# Patient Record
Sex: MALE | Race: WHITE | Employment: UNEMPLOYED | URBAN - METROPOLITAN AREA
[De-identification: names, ages, dates, MRNs, and addresses within clinical notes are randomized per-mention and may not be internally consistent; named-entity substitution may affect disease eponyms.]

---

## 2019-05-20 ENCOUNTER — OFFICE VISIT (OUTPATIENT)
Dept: CARDIOLOGY CLINIC | Age: 64
End: 2019-05-20

## 2019-05-20 VITALS
RESPIRATION RATE: 16 BRPM | WEIGHT: 178 LBS | HEIGHT: 68 IN | DIASTOLIC BLOOD PRESSURE: 90 MMHG | BODY MASS INDEX: 26.98 KG/M2 | OXYGEN SATURATION: 99 % | HEART RATE: 136 BPM | SYSTOLIC BLOOD PRESSURE: 130 MMHG

## 2019-05-20 DIAGNOSIS — I10 ESSENTIAL HYPERTENSION: ICD-10-CM

## 2019-05-20 DIAGNOSIS — Z76.89 ENCOUNTER TO ESTABLISH CARE: ICD-10-CM

## 2019-05-20 DIAGNOSIS — I48.91 ATRIAL FIBRILLATION WITH RVR (HCC): Primary | ICD-10-CM

## 2019-05-20 RX ORDER — DILTIAZEM HYDROCHLORIDE 180 MG/1
180 CAPSULE, COATED, EXTENDED RELEASE ORAL DAILY
Qty: 30 CAP | Refills: 6 | Status: SHIPPED | OUTPATIENT
Start: 2019-05-20 | End: 2019-06-03 | Stop reason: ALTCHOICE

## 2019-05-20 NOTE — PROGRESS NOTES
[id number] [address]  [phone]     Subjective:      Emerald Reyes is a 59 y.o. male with pmhx colon cancer who presents to clinic seeking cardiac clearance for pending cataract surgery. He was at the [facility] early this morning for scheduled surgery, found to be in atrial fib so surgery was canceled and now needs clearance prior to proceed for said procedure. He denies any cardiac symtpoms. Family hx CAD (father), denies hx of smoking / alcohol / drug abuse. The patient denies chest pain/ shortness of breath, orthopnea, PND, LE edema, palpitations, syncope, or presyncope. There are no active problems to display for this patient. No primary care provider on file.   Past Medical History:   Diagnosis Date    Atrial fibrillation [facility])       Past Surgical History:   Procedure Laterality Date    HX COLECTOMY  09/2013    cancer     Not on File   Family History   Problem Relation Age of Onset    Cancer Mother     Coronary Artery Disease Father     Cancer Sister     Cancer Brother     Emphysema Brother       Social History     Socioeconomic History    Marital status:      Spouse name: Not on file    Number of children: Not on file    Years of education: Not on file    Highest education level: Not on file   Occupational History    Not on file   Social Needs    Financial resource strain: Not on file    Food insecurity:     Worry: Not on file     Inability: Not on file    Transportation needs:     Medical: Not on file     Non-medical: Not on file   Tobacco Use    Smoking status: Never Smoker   Substance and Sexual Activity    Alcohol use: Not Currently     Frequency: Never    Drug use: Never    Sexual activity: Not on file   Lifestyle    Physical activity:     Days per week: Not on file     Minutes per session: Not on file    Stress: Not on file   Relationships    Social connections:     Talks on phone: Not on file     Gets together: Not on file     Attends Religion service: Not on file     Active member of club or organization: Not on file     Attends meetings of clubs or organizations: Not on file     Relationship status: Not on file    Intimate partner violence:     Fear of current or ex partner: Not on file     Emotionally abused: Not on file     Physically abused: Not on file     Forced sexual activity: Not on file   Other Topics Concern    Not on file   Social History Narrative    Not on file      Current Outpatient Medications   Medication Sig    dilTIAZem CD (CARDIZEM CD) 180 mg ER capsule Take 1 Cap by mouth daily.  apixaban (ELIQUIS) 5 mg tablet Take 1 Tab by mouth two (2) times a day. No current facility-administered medications for this visit. Review of Symptoms:  11 systems reviewed, negative other than as stated in the HPI    Physical ExamPhysical Exam:    Vitals:    05/20/19 1017 05/20/19 1018 05/20/19 1045   BP: 110/90 (!) 120/100 130/90   Pulse: (!) 136     Resp: 16     SpO2: 99%     Weight: 178 lb (80.7 kg)     Height: 5' 8\" (1.727 m)       Body mass index is 27.06 kg/m². General PE   Gen:  NAD  Mental Status - Alert. General Appearance - Not in acute distress. Chest and Lung Exam   Inspection: Accessory muscles - No use of accessory muscles in breathing. Auscultation:   Breath sounds: - Normal.   Cardiovascular   Inspection: Jugular vein - Bilateral - Inspection Normal.   Palpation/Percussion:   Apical Impulse: - Normal.   Auscultation: Rhythm - IRRegular. Heart Sounds - S1 WNL and S2 WNL. No S3 or S4. Murmurs & Other Heart Sounds: Auscultation of the heart reveals - No Murmurs. Peripheral Vascular   Upper Extremity: Inspection - Bilateral - No Cyanotic nailbeds or Digital clubbing. Lower Extremity:   Palpation: Edema - Bilateral - No edema. Abdomen:   Soft, non-tender, bowel sounds are active.   Neuro: A&O times 3, CN and motor grossly WNL    Labs:   No results found for: CHOL, CHOLX, CHLST, CHOLV, 692300, HDL, LDL, LDLC, DLDLP, TGLX, TRIGL, TRIGP, CHHD, CHHDX  No results found for: CPK, CPKX, CPX  No results found for: NA, K, CL, CO2, AGAP, GLU, BUN, CREA, BUCR, GFRAA, GFRNA, CA, TBIL, TBILI, GPT, SGOT, AP, TP, ALB, GLOB, AGRAT, ALT    EKG:  AF RVR     Assessment:     Assessment:      1. Atrial fibrillation with RVR ([facility] [address]    2. Encounter to establish care    3. Essential hypertension        Orders Placed This Encounter    CBC W/O DIFF    CK    LIPID PANEL    METABOLIC PANEL, COMPREHENSIVE    MAGNESIUM    TSH REFLEX TO T4    AMB POC EKG ROUTINE W/ 12 LEADS, INTER & REP     Order Specific Question:   Reason for Exam:     Answer:   routine    dilTIAZem CD (CARDIZEM CD) 180 mg ER capsule     Sig: Take 1 Cap by mouth daily. Dispense:  30 Cap     Refill:  6    apixaban (ELIQUIS) 5 mg tablet     Sig: Take 1 Tab by mouth two (2) times a day. Dispense:  60 Tab     Refill:  3        Plan: This is a 59 y.o. male with pmhx colon cancer who presents to clinic seeking cardiac clearance for pending cataract surgery. He was at the [facility]. early this morning for scheduled surgery, found to be in atrial fib so surgery was canceled and now needs clearance prior to proceed for said procedure. He denies any cardiac symtpoms. Family hx CAD (father), denies hx of smoking / alcohol / drug abuse. Cardiac risk factors: male, age, unknown lipids, sedentary lifestyle, elevated BMI, family hx CAD    To note, he has not seen PCP since 2013. AF with RVR  Start Diltiazem 180 mg daily  Start Eliquis 5 mg BID for CVA risk reduction. Discussed risks and benefits of anticoagulation, signs and symptoms of bleeding, and to call if any concerns. Obtain echo to r/o structural heart disease. Check baseline labs including thyroid and electrolytes. F/u with np 2 weeks. If he remains in AF, plan for cardioversion. If he is in rate controlled AF, he may proceed with his cataract surgery. Atrial fibrillation CHADSVASC2 score stroke risk:   59 y.o.                                        <65        + 0   male                                         Male     [de-identified]  CHF hx                                           No    + 0  HTN hx                                           Yes    +1  Stroke/TIA/Thromboembolism       No    +0  Vascular disease hx                       No    + 0  Diabetes Mellitus                            No    + 0   CHADSVASC2 score                    1      Annual Stroke Risk 0.6% - low-moderate risk     BP elevated. Will reassess at follow up. Highly recommend to establish care with PCP. F/u in 2 weeks with NP.       Elias Steele MD

## 2019-05-20 NOTE — PROGRESS NOTES
Patient was seen at [facility] for procedure ( cataract) referred due to abnormal EKG  He has no cardiac complaints.

## 2019-05-24 LAB
ALBUMIN SERPL-MCNC: 4.6 G/DL (ref 3.6–4.8)
ALBUMIN/GLOB SERPL: 1.6 {RATIO} (ref 1.2–2.2)
ALP SERPL-CCNC: 82 IU/L (ref 39–117)
ALT SERPL-CCNC: 14 IU/L (ref 0–44)
AST SERPL-CCNC: 19 IU/L (ref 0–40)
BILIRUB SERPL-MCNC: 1 MG/DL (ref 0–1.2)
BUN SERPL-MCNC: 17 MG/DL (ref 8–27)
BUN/CREAT SERPL: 12 (ref 10–24)
CALCIUM SERPL-MCNC: 9.9 MG/DL (ref 8.6–10.2)
CHLORIDE SERPL-SCNC: 104 MMOL/L (ref 96–106)
CHOLEST SERPL-MCNC: 300 MG/DL (ref 100–199)
CK SERPL-CCNC: 109 U/L (ref 24–204)
CO2 SERPL-SCNC: 21 MMOL/L (ref 20–29)
COMMENT, 011824: ABNORMAL
CREAT SERPL-MCNC: 1.4 MG/DL (ref 0.76–1.27)
ERYTHROCYTE [DISTWIDTH] IN BLOOD BY AUTOMATED COUNT: 13.8 % (ref 12.3–15.4)
GLOBULIN SER CALC-MCNC: 2.9 G/DL (ref 1.5–4.5)
GLUCOSE SERPL-MCNC: 100 MG/DL (ref 65–99)
HCT VFR BLD AUTO: 46.1 % (ref 37.5–51)
HDLC SERPL-MCNC: 51 MG/DL
HGB BLD-MCNC: 15.5 G/DL (ref 13–17.7)
INTERPRETATION, 910389: NORMAL
INTERPRETATION: NORMAL
LDLC SERPL CALC-MCNC: 220 MG/DL (ref 0–99)
MAGNESIUM SERPL-MCNC: 2 MG/DL (ref 1.6–2.3)
MCH RBC QN AUTO: 29.8 PG (ref 26.6–33)
MCHC RBC AUTO-ENTMCNC: 33.6 G/DL (ref 31.5–35.7)
MCV RBC AUTO: 89 FL (ref 79–97)
PDF IMAGE, 910387: NORMAL
PLATELET # BLD AUTO: 177 X10E3/UL (ref 150–450)
POTASSIUM SERPL-SCNC: 4.6 MMOL/L (ref 3.5–5.2)
PROT SERPL-MCNC: 7.5 G/DL (ref 6–8.5)
RBC # BLD AUTO: 5.21 X10E6/UL (ref 4.14–5.8)
SODIUM SERPL-SCNC: 142 MMOL/L (ref 134–144)
TRIGL SERPL-MCNC: 143 MG/DL (ref 0–149)
TSH SERPL DL<=0.005 MIU/L-ACNC: 2.04 UIU/ML (ref 0.45–4.5)
VLDLC SERPL CALC-MCNC: 29 MG/DL (ref 5–40)
WBC # BLD AUTO: 6.3 X10E3/UL (ref 3.4–10.8)

## 2019-05-27 RX ORDER — ROSUVASTATIN CALCIUM 20 MG/1
20 TABLET, COATED ORAL
Qty: 90 TAB | Refills: 1
Start: 2019-05-27 | End: 2019-07-30 | Stop reason: SDUPTHER

## 2019-05-27 NOTE — PROGRESS NOTES
Kelly,    Please call the patient and inform him labs were reviewed. Overall, no concern for infection, anemia, liver dysfunction or electrolyte abnormalities. Kidney function is a little decreased, but we do not have any baseline to see if this is a chronic issue or new. Thyroid function is normal too, which is good. Cholesterol numbers, however, were extremely elevated, particularly the LDL, which is driving the Total cholesterol to be high also. LDL is 220 -- ideally for a healthy patient, this number should be < 130!! Total cholesterol is 300 -- this should be < 200!! He will most likely need to start medications to lower cholesterol. If willing, please start crestor 20 mg daily at bedtime. We can see how he's feeling with the medication at his follow-up visit with us in a few weeks. I have ordered the prescription but not sent it to the pharmacy yet -- if pt is willing, please send rx, otherwise, we will discuss further at his f/u appointment.     Thanks,  [employer]

## 2019-05-28 ENCOUNTER — TELEPHONE (OUTPATIENT)
Dept: CARDIOLOGY CLINIC | Age: 64
End: 2019-05-28

## 2019-05-28 NOTE — TELEPHONE ENCOUNTER
----- Message from Marilu Daniel NP sent at 5/27/2019  2:35 PM EDT -----  Smiley Louie,    Please call the patient and inform him labs were reviewed. Overall, no concern for infection, anemia, liver dysfunction or electrolyte abnormalities. Kidney function is a little decreased, but we do not have any baseline to see if this is a chronic issue or new. Thyroid function is normal too, which is good. Cholesterol numbers, however, were extremely elevated, particularly the LDL, which is driving the Total cholesterol to be high also. LDL is 220 -- ideally for a healthy patient, this number should be < 130!! Total cholesterol is 300 -- this should be < 200!! He will most likely need to start medications to lower cholesterol. If willing, please start crestor 20 mg daily at bedtime. We can see how he's feeling with the medication at his follow-up visit with us in a few weeks. I have ordered the prescription but not sent it to the pharmacy yet -- if pt is willing, please send rx, otherwise, we will discuss further at his f/u appointment.     Thanks,  [employer]

## 2019-05-29 ENCOUNTER — TELEPHONE (OUTPATIENT)
Dept: CARDIOLOGY CLINIC | Age: 64
End: 2019-05-29

## 2019-05-29 NOTE — TELEPHONE ENCOUNTER
Pt having issues getting crestor filled at [address]. Would like generic if possible.     Thanks, [employer]

## 2019-06-03 ENCOUNTER — OFFICE VISIT (OUTPATIENT)
Dept: CARDIOLOGY CLINIC | Age: 64
End: 2019-06-03

## 2019-06-03 VITALS
WEIGHT: 178.2 LBS | OXYGEN SATURATION: 98 % | HEART RATE: 98 BPM | DIASTOLIC BLOOD PRESSURE: 82 MMHG | RESPIRATION RATE: 18 BRPM | BODY MASS INDEX: 27.01 KG/M2 | HEIGHT: 68 IN | SYSTOLIC BLOOD PRESSURE: 118 MMHG

## 2019-06-03 DIAGNOSIS — I42.0 DILATED CARDIOMYOPATHY (HCC): ICD-10-CM

## 2019-06-03 DIAGNOSIS — I48.0 PAROXYSMAL ATRIAL FIBRILLATION (HCC): Primary | ICD-10-CM

## 2019-06-03 DIAGNOSIS — E78.2 MIXED HYPERLIPIDEMIA: ICD-10-CM

## 2019-06-03 PROBLEM — I48.91 ATRIAL FIBRILLATION (HCC): Status: ACTIVE | Noted: 2019-06-03

## 2019-06-03 RX ORDER — LISINOPRIL 2.5 MG/1
2.5 TABLET ORAL DAILY
Qty: 90 TAB | Refills: 3 | Status: SHIPPED | OUTPATIENT
Start: 2019-06-03 | End: 2020-05-13 | Stop reason: SDUPTHER

## 2019-06-03 RX ORDER — METOPROLOL SUCCINATE 100 MG/1
100 TABLET, EXTENDED RELEASE ORAL DAILY
Qty: 90 TAB | Refills: 3 | Status: SHIPPED | OUTPATIENT
Start: 2019-06-03 | End: 2019-07-23 | Stop reason: SDUPTHER

## 2019-06-03 NOTE — PROGRESS NOTES
David Lao, [facility]    Subjective/HPI:     Mr. Finesse Yee is a 59 y.o. male is here for A fib f/u. He has a PMHx of PAF new onset and colon cancer. At last visit, we started Diltiazem and Eliquis for new onset A fib with RVR, and also obtained echocardiogram.  He is pending cataract surgery and requires cardiac clearance. He denies complaints of shortness of breath, dizziness, palpitations, chest pains, orthopnea, PND or edema  He has been tolerating all medications well. He has not yet started Crestor because he wanted to discuss his labwork further. Cataract surgery has been rescheduled for June 17, 2019. Echo (5/29/19):  · Left Ventricle: Mildly dilated left ventricle. Severe systolic dysfunction. Estimated left ventricular ejection fraction is 16 - 20%. Left ventricular global hypokinesis. · Left Atrium: Mildly dilated left atrium. · Mitral Valve: Mild to moderate mitral valve regurgitation. PCP Provider  No primary care provider on file.   Past Medical History:   Diagnosis Date    Atrial fibrillation [facility])       Past Surgical History:   Procedure Laterality Date    HX COLECTOMY  09/2013    cancer     Family History   Problem Relation Age of Onset    Cancer Mother     Coronary Artery Disease Father     Cancer Sister     Cancer Brother     Emphysema Brother      Social History     Socioeconomic History    Marital status:      Spouse name: Not on file    Number of children: Not on file    Years of education: Not on file    Highest education level: Not on file   Occupational History    Not on file   Social Needs    Financial resource strain: Not on file    Food insecurity:     Worry: Not on file     Inability: Not on file    Transportation needs:     Medical: Not on file     Non-medical: Not on file   Tobacco Use    Smoking status: Never Smoker    Smokeless tobacco: Never Used   Substance and Sexual Activity    Alcohol use: Not Currently     Frequency: Never    Drug use: Never    Sexual activity: Not on file   Lifestyle    Physical activity:     Days per week: Not on file     Minutes per session: Not on file    Stress: Not on file   Relationships    Social connections:     Talks on phone: Not on file     Gets together: Not on file     Attends [employer] service: Not on file     Active member of club or organization: Not on file     Attends meetings of clubs or organizations: Not on file     Relationship status: Not on file    Intimate partner violence:     Fear of current or ex partner: Not on file     Emotionally abused: Not on file     Physically abused: Not on file     Forced sexual activity: Not on file   Other Topics Concern    Not on file   Social History Narrative    Not on file       No Known Allergies     Current Outpatient Medications   Medication Sig    dilTIAZem CD (CARDIZEM CD) 180 mg ER capsule Take 1 Cap by mouth daily.  apixaban (ELIQUIS) 5 mg tablet Take 1 Tab by mouth two (2) times a day.  rosuvastatin (CRESTOR) 20 mg tablet Take 1 Tab by mouth nightly. Indications: high cholesterol (Patient taking differently: Take 20 mg by mouth nightly. Hasn't started  Indications: high cholesterol)     No current facility-administered medications for this visit. I have reviewed the problem list, allergy list, medical history, family, social history and medications. Review of Symptoms:    Review of Systems   Constitutional: Negative for chills, fever and weight loss. HENT: Negative for nosebleeds. Eyes: Negative for blurred vision and double vision. Respiratory: Negative for cough, shortness of breath and wheezing. Cardiovascular: Negative for chest pain, palpitations, orthopnea, leg swelling and PND. Gastrointestinal: Negative for abdominal pain, blood in stool, diarrhea, nausea and vomiting. Musculoskeletal: Negative for joint pain. Skin: Negative for rash.    Neurological: Negative for dizziness, tingling and loss of consciousness. Endo/Heme/Allergies: Does not bruise/bleed easily. Physical Exam:      General: Well developed, in no acute distress, cooperative and alert  HEENT: No carotid bruits, no JVD, trach is midline. Neck Supple, PEERL, EOM intact. Heart:  reg rate and rhythm; normal S1/S2; no murmurs, gallops or rubs. Respiratory: Clear bilaterally x 4, no wheezing or rales  Abdomen:   Soft, non-tender, no distention, no masses. + BS. Extremities:  Normal cap refill, no cyanosis, atraumatic. No edema. Neuro: A&Ox3, speech clear, gait stable. Skin: Skin color is normal. No rashes or lesions. Non diaphoretic  Vascular: 2+ pulses symmetric in all extremities    Vitals:    06/03/19 1343 06/03/19 1351   BP: 118/82 118/82   Pulse: 98    Resp: 18    SpO2: 98%    Weight: 178 lb 3.2 oz (80.8 kg)    Height: 5' 8\" (1.727 m)        Cardiographics    ECG: atrial fibrillation  No results found for this or any previous visit. Cardiology Labs:  Lab Results   Component Value Date/Time    Cholesterol, total 300 (H) 05/23/2019 10:04 AM    HDL Cholesterol 51 05/23/2019 10:04 AM    LDL, calculated 220 (H) 05/23/2019 10:04 AM    Triglyceride 143 05/23/2019 10:04 AM       Lab Results   Component Value Date/Time    Sodium 142 05/23/2019 10:04 AM    Potassium 4.6 05/23/2019 10:04 AM    Chloride 104 05/23/2019 10:04 AM    CO2 21 05/23/2019 10:04 AM    Glucose 100 (H) 05/23/2019 10:04 AM    BUN 17 05/23/2019 10:04 AM    Creatinine 1.40 (H) 05/23/2019 10:04 AM    BUN/Creatinine ratio 12 05/23/2019 10:04 AM    GFR est AA 62 05/23/2019 10:04 AM    GFR est non-AA 53 (L) 05/23/2019 10:04 AM    Calcium 9.9 05/23/2019 10:04 AM    Bilirubin, total 1.0 05/23/2019 10:04 AM    AST (SGOT) 19 05/23/2019 10:04 AM    Alk.  phosphatase 82 05/23/2019 10:04 AM    Protein, total 7.5 05/23/2019 10:04 AM    Albumin 4.6 05/23/2019 10:04 AM    A-G Ratio 1.6 05/23/2019 10:04 AM    ALT (SGPT) 14 05/23/2019 10:04 AM           Assessment:     Assessment: ICD-10-CM ICD-9-CM    1. Paroxysmal atrial fibrillation (HCC) I48.0 427.31 AMB POC EKG ROUTINE W/ 12 LEADS, INTER & REP      metoprolol succinate (TOPROL-XL) 100 mg tablet      METABOLIC PANEL, BASIC   2. Dilated cardiomyopathy (HCC) I42.0 425.4 NUCLEAR CARDIAC STRESS TEST      metoprolol succinate (TOPROL-XL) 100 mg tablet   3. Mixed hyperlipidemia E78.2 272.2         Plan:     1. Paroxysmal atrial fibrillation ([address]  New onset PAF with RVR last visit -- rates improved today. Stop CCB given cardiomyopathy; start Toprol 100 mg daily. Continue Eliquis  - AMB POC EKG ROUTINE W/ 12 LEADS, INTER & REP    2. Dilated cardiomyopathy ([address]  Echo done 5/2019 with reduced LVEF 15-20%, mildly dilated LA and mild-mod MR  Likely tachycardic induced CM, but given risk factors, cannot completely rule out ischemia. Will start Lisinopril 2.5 mg daily; stop Dilt and start Toprol 100 mg daily. Repeat BMP in 1 month. Will obtain Lexiscan stress test    3. Mixed hyperlipidemia   in 5/2019  Discussed role of genetics and diet in hyperlipidemia  He will start taking Crestor 20 mg daily; plan to repeat labs in 3 months  Encouraged low fat, low cholesterol diet    F/u in 1 month to reassess rates and discuss stress test results. If stress test negative, will plan for cardioversion to restore sinus rhythm. Regardless of stress testing, he may proceed with cataract surgery considered low operative risk from CV standpoint, so long as rates are controlled. Addendum 6/13/19:  Discussed with Dr. Collette Falcon, who also recommended placing patient on LifeVest.  Called patient and discussed with him over the phone regarding [place], it's use in preventing sudden cardiac death, and him being at high risk for SCD given low ejection fraction. Discussed that the LifeVest would only be temporary until we re-evaluate the echocardiogram. The patient declines to place LifeVest at this time.   He does understand the risk of sudden cardiac death that could occur at any time. He will keep his f/u appointment with me on 7/8/19 to check HR and also assess response to medications.   1:07 PM      Grisel Baker NP

## 2019-06-03 NOTE — PROGRESS NOTES
Chief Complaint   Patient presents with    Irregular Heart Beat     2 week follow up    Surgical Clearance     need clearance for eye procedure     1. Have you been to the ER, urgent care clinic since your last visit? Hospitalized since your last visit? No    2. Have you seen or consulted any other health care providers outside of the [address] [place] [address] since your last visit? Include any pap smears or colon screening.  No

## 2019-06-12 ENCOUNTER — TELEPHONE (OUTPATIENT)
Dept: CARDIOLOGY CLINIC | Age: 64
End: 2019-06-12

## 2019-06-12 NOTE — TELEPHONE ENCOUNTER
Kelly-no evidence of active blood flow problems in the heart, but I would like the patient to follow-up in the near future to consider medication changes including changing lisinopril to [facility], probably changing metoprolol to carvedilol, arranging for cardioversion plus or minus transesophageal echo depending on whether the patient has been on anticoagulation for at least 4 to 6 weeks. We will also need to consider cardiac MRI if heart function does not improve with restoration of normal sinus rhythm. Juan Ree also need to consider Life-Vest.  Thanks.

## 2019-06-13 ENCOUNTER — TELEPHONE (OUTPATIENT)
Dept: CARDIOLOGY CLINIC | Age: 64
End: 2019-06-13

## 2019-06-13 NOTE — TELEPHONE ENCOUNTER
Patient informed has appt 7/8/19 with OC Reyes.  He questions if cleared for cataract 6/17/19 please advise thanks

## 2019-06-13 NOTE — TELEPHONE ENCOUNTER
Fax note to [phone] as soon as possible because they are closed tomorrow and anesthesia is asking for it now. Thanks!

## 2019-07-02 ENCOUNTER — TELEPHONE (OUTPATIENT)
Dept: CARDIOLOGY CLINIC | Age: 64
End: 2019-07-02

## 2019-07-02 LAB
BUN SERPL-MCNC: 21 MG/DL (ref 8–27)
BUN/CREAT SERPL: 16 (ref 10–24)
CALCIUM SERPL-MCNC: 9.7 MG/DL (ref 8.6–10.2)
CHLORIDE SERPL-SCNC: 105 MMOL/L (ref 96–106)
CO2 SERPL-SCNC: 25 MMOL/L (ref 20–29)
CREAT SERPL-MCNC: 1.34 MG/DL (ref 0.76–1.27)
GLUCOSE SERPL-MCNC: 108 MG/DL (ref 65–99)
INTERPRETATION: NORMAL
POTASSIUM SERPL-SCNC: 4.7 MMOL/L (ref 3.5–5.2)
SODIUM SERPL-SCNC: 143 MMOL/L (ref 134–144)

## 2019-07-02 NOTE — TELEPHONE ENCOUNTER
----- Message from Blossom Briceño NP sent at 7/2/2019  8:56 AM EDT -----  Verdis Grade,    Please call the patient and inform that labwork shows stable kidney function. Please make sure he continues the medications we prescribed last visit and keep his f/u appt with me/Dr. Sarita Gallegos as scheduled.     Thanks,  [employer]

## 2019-07-02 NOTE — PROGRESS NOTES
Kelly,    Please call the patient and inform that labwork shows stable kidney function. Please make sure he continues the medications we prescribed last visit and keep his f/u appt with me/Dr. Magalie Jeter as scheduled.     Thanks,  [employer]

## 2019-07-03 DIAGNOSIS — I48.0 PAROXYSMAL ATRIAL FIBRILLATION (HCC): ICD-10-CM

## 2019-07-05 NOTE — PROGRESS NOTES
Germaine Scheuermann, [facility]    Subjective/HPI:     Mr. Esperanza Starr is a 59 y.o. male is here for A fib f/u. He has a PMHx of PAF new onset, non-ischemic cardiomyopathy, familial HLD and colon cancer. At last visit, we adjusted medications for optimal medical management for cardiomyopathy, started statin therapy and obtained lexiscan stress test to evaluate for ischemia. He was unable to get cataract surgery done as his anesthesiologist felt he was at high risk for anesthesia given cardiomyopathy. He is doing well with current meds. He denies complaints of chest pains, dizziness, orthopnea, PND or edema. He is interested in [employer] now, after further discussion. Lexiscan (6/12/19):  · Baseline ECG: Atrial fibrillation. · There was no ST segment deviation noted during stress. · Gated SPECT: Left ventricular function post-stress was abnormal. Calculated ejection fraction is 24%. There is no evidence of transient ischemic dilation (TID). · Calculated ejection fraction is likely to be inaccurate due to gating artifact from atrial fibrillation. Correlation with echocardiogram is recommended. · Left ventricular perfusion is probably normal.  · Myocardial perfusion imaging defect 1: There is a defect that is small in size with a mild reduction in uptake present in the mid inferior location(s) that is non-reversible. The defect appears to probably be artifact caused by subdiaphragmatic activity. Perfusion defect was visually present without quantitative evidence. · Myocardial perfusion imaging supports a low risk stress test.           PCP Provider  No primary care provider on file.   Past Medical History:   Diagnosis Date    Atrial fibrillation [facility])       Past Surgical History:   Procedure Laterality Date    HX COLECTOMY  09/2013    cancer     Family History   Problem Relation Age of Onset    Cancer Mother     Coronary Artery Disease Father     Cancer Sister     Cancer Brother     Emphysema Brother      Social History     Socioeconomic History    Marital status:      Spouse name: Not on file    Number of children: Not on file    Years of education: Not on file    Highest education level: Not on file   Occupational History    Not on file   Social Needs    Financial resource strain: Not on file    Food insecurity:     Worry: Not on file     Inability: Not on file    Transportation needs:     Medical: Not on file     Non-medical: Not on file   Tobacco Use    Smoking status: Never Smoker    Smokeless tobacco: Never Used   Substance and Sexual Activity    Alcohol use: Not Currently     Frequency: Never    Drug use: Never    Sexual activity: Not on file   Lifestyle    Physical activity:     Days per week: Not on file     Minutes per session: Not on file    Stress: Not on file   Relationships    Social connections:     Talks on phone: Not on file     Gets together: Not on file     Attends Hinduism service: Not on file     Active member of club or organization: Not on file     Attends meetings of clubs or organizations: Not on file     Relationship status: Not on file    Intimate partner violence:     Fear of current or ex partner: Not on file     Emotionally abused: Not on file     Physically abused: Not on file     Forced sexual activity: Not on file   Other Topics Concern    Not on file   Social History Narrative    Not on file       No Known Allergies     Current Outpatient Medications   Medication Sig    amiodarone (CORDARONE) 200 mg tablet Take 1 Tab by mouth daily. 1 tablet p.o. twice daily for 2 weeks, then decrease to 200 mg daily    metoprolol succinate (TOPROL-XL) 100 mg tablet Take 1 Tab by mouth daily.  lisinopril (PRINIVIL, ZESTRIL) 2.5 mg tablet Take 1 Tab by mouth daily.  rosuvastatin (CRESTOR) 20 mg tablet Take 1 Tab by mouth nightly. Indications: high cholesterol    apixaban (ELIQUIS) 5 mg tablet Take 1 Tab by mouth two (2) times a day.      No current facility-administered medications for this visit. I have reviewed the problem list, allergy list, medical history, family, social history and medications. Review of Symptoms:    Review of Systems   Constitutional: Negative for chills, fever and weight loss. HENT: Negative for nosebleeds. Eyes: Negative for blurred vision and double vision. Respiratory: Negative for cough, shortness of breath and wheezing. Cardiovascular: Negative for chest pain, palpitations, orthopnea, leg swelling and PND. Gastrointestinal: Negative for abdominal pain, blood in stool, diarrhea, nausea and vomiting. Musculoskeletal: Negative for joint pain. Skin: Negative for rash. Neurological: Negative for dizziness, tingling and loss of consciousness. Endo/Heme/Allergies: Does not bruise/bleed easily. Physical Exam:      General: Well developed, in no acute distress, cooperative and alert  HEENT: No carotid bruits, no JVD, trach is midline. Neck Supple, PEERL, EOM intact. Heart:  irreg irreg; normal S1/S2; no murmurs, gallops or rubs. Respiratory: Clear bilaterally x 4, no wheezing or rales  Abdomen:   Soft, non-tender, no distention, no masses. + BS. Extremities:  Normal cap refill, no cyanosis, atraumatic. No edema. Neuro: A&Ox3, speech clear, gait stable. Skin: Skin color is normal. No rashes or lesions. Non diaphoretic  Vascular: 2+ pulses symmetric in all extremities    Vitals:    07/08/19 1314 07/08/19 1317   BP: 108/62 106/60   Pulse: 72    Resp: 18    SpO2: 98%    Weight: 178 lb (80.7 kg)    Height: 5' 8\" (1.727 m)        Cardiographics    ECG: atrial fibrillation  No results found for this or any previous visit.     Cardiology Labs:  Lab Results   Component Value Date/Time    Cholesterol, total 300 (H) 05/23/2019 10:04 AM    HDL Cholesterol 51 05/23/2019 10:04 AM    LDL, calculated 220 (H) 05/23/2019 10:04 AM    Triglyceride 143 05/23/2019 10:04 AM       Lab Results   Component Value Date/Time    Sodium 143 07/01/2019 08:43 AM    Potassium 4.7 07/01/2019 08:43 AM    Chloride 105 07/01/2019 08:43 AM    CO2 25 07/01/2019 08:43 AM    Glucose 108 (H) 07/01/2019 08:43 AM    BUN 21 07/01/2019 08:43 AM    Creatinine 1.34 (H) 07/01/2019 08:43 AM    BUN/Creatinine ratio 16 07/01/2019 08:43 AM    GFR est AA 65 07/01/2019 08:43 AM    GFR est non-AA 56 (L) 07/01/2019 08:43 AM    Calcium 9.7 07/01/2019 08:43 AM    Bilirubin, total 1.0 05/23/2019 10:04 AM    AST (SGOT) 19 05/23/2019 10:04 AM    Alk. phosphatase 82 05/23/2019 10:04 AM    Protein, total 7.5 05/23/2019 10:04 AM    Albumin 4.6 05/23/2019 10:04 AM    A-G Ratio 1.6 05/23/2019 10:04 AM    ALT (SGPT) 14 05/23/2019 10:04 AM           Assessment:     Assessment:       ICD-10-CM ICD-9-CM    1. Paroxysmal atrial fibrillation (HCC) I48.0 427.31 AMB POC EKG ROUTINE W/ 12 LEADS, INTER & REP      ECHO ADULT COMPLETE      MAGNESIUM      METABOLIC PANEL, BASIC      CARDIOVERSION, ELECTIVE;EXTERN   2. NICM (nonischemic cardiomyopathy) (HCC) I42.8 425.4 AMB POC EKG ROUTINE W/ 12 LEADS, INTER & REP      ECHO ADULT COMPLETE      MAGNESIUM      METABOLIC PANEL, BASIC      CARDIOVERSION, ELECTIVE;EXTERN   3. Mixed hyperlipidemia E78.2 272.2 ECHO ADULT COMPLETE      MAGNESIUM      METABOLIC PANEL, BASIC      CARDIOVERSION, ELECTIVE;EXTERN        Plan:     1. Paroxysmal atrial fibrillation (HCC)  Currently on Toprol for rate control and Eliquis for anti-coagulation  Remains in A fib, will plan for cardioversion. Has completed at least 4 weeks of anti-coagulation therapy. Add amiodarone 200 mg p.o. twice daily for 2 weeks, then 200 mg daily to increase chances of converting to NSR  F/u after cardioversion to assess rhythm.     2. Dilated cardiomyopathy ([address]  Echo done 5/2019 with reduced LVEF 15-20%, mildly dilated LA and mild-mod MR  Lexiscan stress test done 6/2019 without evidence of ischemia  Likely tachycardia-induced  Continue with Toprol and Lisinopril for medical management -- unable to use entresto due to borderline hypotension  Discussed use of LifeVest and he is agreeable to using this. He is at high risk for sudden cardiac death. Will arrange for LifeVest. Patient has a non-ischemic cardiomyopathy with increased risk for sudden cardiac death with ejection fraction 15-20% on most recent echocardiogram.  Plan to repeat echocardiogram in 9/3/2019. Follow-up after that. 3. Mixed hyperlipidemia   in 5/2019; Crestor 20 mg daily started. Repeat lipids in 9/2019.   High likelihood of familial HLD given elevated LDL > 190    F/u after cardioversion with Dr. Miguel Langston MD

## 2019-07-08 ENCOUNTER — OFFICE VISIT (OUTPATIENT)
Dept: CARDIOLOGY CLINIC | Age: 64
End: 2019-07-08

## 2019-07-08 VITALS
DIASTOLIC BLOOD PRESSURE: 60 MMHG | OXYGEN SATURATION: 98 % | RESPIRATION RATE: 18 BRPM | WEIGHT: 178 LBS | HEIGHT: 68 IN | SYSTOLIC BLOOD PRESSURE: 106 MMHG | HEART RATE: 72 BPM | BODY MASS INDEX: 26.98 KG/M2

## 2019-07-08 DIAGNOSIS — E78.2 MIXED HYPERLIPIDEMIA: ICD-10-CM

## 2019-07-08 DIAGNOSIS — I48.0 PAROXYSMAL ATRIAL FIBRILLATION (HCC): Primary | ICD-10-CM

## 2019-07-08 DIAGNOSIS — I42.8 NICM (NONISCHEMIC CARDIOMYOPATHY) (HCC): ICD-10-CM

## 2019-07-08 RX ORDER — AMIODARONE HYDROCHLORIDE 200 MG/1
200 TABLET ORAL DAILY
Qty: 90 TAB | Refills: 1 | Status: SHIPPED | OUTPATIENT
Start: 2019-07-08 | End: 2019-12-19 | Stop reason: SDUPTHER

## 2019-07-08 NOTE — PROGRESS NOTES
Verified patient with 2 identifiers   Reviewed record in preparation for visit and obtained necessary documentation. Chief Complaint   Patient presents with    Results     follow up stress test - cardiac clearance for eye surgery -     Shortness of Breath     when bending down to do something      1. Have you been to the ER, urgent care clinic since your last visit? Hospitalized since your last visit? No     2. Have you seen or consulted any other health care providers outside of the [address] [place] [address] since your last visit? Include any pap smears or colon screening.   No

## 2019-07-09 LAB
BUN SERPL-MCNC: 22 MG/DL (ref 8–27)
BUN/CREAT SERPL: 16 (ref 10–24)
CALCIUM SERPL-MCNC: 9.9 MG/DL (ref 8.6–10.2)
CHLORIDE SERPL-SCNC: 102 MMOL/L (ref 96–106)
CO2 SERPL-SCNC: 24 MMOL/L (ref 20–29)
CREAT SERPL-MCNC: 1.34 MG/DL (ref 0.76–1.27)
GLUCOSE SERPL-MCNC: 87 MG/DL (ref 65–99)
INTERPRETATION: NORMAL
MAGNESIUM SERPL-MCNC: 2.3 MG/DL (ref 1.6–2.3)
POTASSIUM SERPL-SCNC: 4.8 MMOL/L (ref 3.5–5.2)
SODIUM SERPL-SCNC: 142 MMOL/L (ref 134–144)

## 2019-07-23 ENCOUNTER — HOSPITAL ENCOUNTER (OUTPATIENT)
Dept: NON INVASIVE DIAGNOSTICS | Age: 64
Discharge: HOME | End: 2019-07-23
Attending: INTERNAL MEDICINE
Payer: COMMERCIAL

## 2019-07-23 VITALS — DIASTOLIC BLOOD PRESSURE: 69 MMHG | HEART RATE: 46 BPM | RESPIRATION RATE: 16 BRPM | SYSTOLIC BLOOD PRESSURE: 144 MMHG

## 2019-07-23 DIAGNOSIS — I42.0 DILATED CARDIOMYOPATHY (HCC): ICD-10-CM

## 2019-07-23 DIAGNOSIS — I48.0 PAROXYSMAL ATRIAL FIBRILLATION (HCC): ICD-10-CM

## 2019-07-23 DIAGNOSIS — I48.91 ATRIAL FIBRILLATION, UNSPECIFIED TYPE (HCC): ICD-10-CM

## 2019-07-23 PROCEDURE — 93005 ELECTROCARDIOGRAM TRACING: CPT

## 2019-07-23 RX ORDER — METOPROLOL SUCCINATE 50 MG/1
50 TABLET, EXTENDED RELEASE ORAL DAILY
Qty: 90 TAB | Refills: 4 | Status: SHIPPED | OUTPATIENT
Start: 2019-07-23 | End: 2020-09-26

## 2019-07-23 NOTE — PROGRESS NOTES
Pt arrived for outpatient Cardioversion, EKG obtained, in Sinus bradycardia. Dr Fallon Kendall notified. 0942-Dr Gregory at bedside and confirmed pt is in Sinus bradycardia. Procedure cancelled at this time.   Metoprolol decreased to 50mg daily and advised to follow-up with Dr Fallon Kendall as previously scheduled and pt sent home

## 2019-07-24 LAB
ATRIAL RATE: 43 BPM
CALCULATED P AXIS, ECG09: 29 DEGREES
CALCULATED R AXIS, ECG10: 46 DEGREES
CALCULATED T AXIS, ECG11: 85 DEGREES
DIAGNOSIS, 93000: NORMAL
P-R INTERVAL, ECG05: 164 MS
Q-T INTERVAL, ECG07: 538 MS
QRS DURATION, ECG06: 96 MS
QTC CALCULATION (BEZET), ECG08: 454 MS
VENTRICULAR RATE, ECG03: 43 BPM

## 2019-07-30 RX ORDER — ROSUVASTATIN CALCIUM 20 MG/1
20 TABLET, COATED ORAL
Qty: 90 TAB | Refills: 3 | Status: SHIPPED | OUTPATIENT
Start: 2019-07-30 | End: 2020-07-10

## 2019-08-30 ENCOUNTER — OFFICE VISIT (OUTPATIENT)
Dept: INTERNAL MEDICINE CLINIC | Age: 64
End: 2019-08-30

## 2019-08-30 VITALS
BODY MASS INDEX: 26.66 KG/M2 | WEIGHT: 180 LBS | TEMPERATURE: 97.6 F | HEIGHT: 69 IN | DIASTOLIC BLOOD PRESSURE: 59 MMHG | HEART RATE: 41 BPM | SYSTOLIC BLOOD PRESSURE: 113 MMHG | OXYGEN SATURATION: 98 % | RESPIRATION RATE: 14 BRPM

## 2019-08-30 DIAGNOSIS — I42.8 NONISCHEMIC CARDIOMYOPATHY (HCC): ICD-10-CM

## 2019-08-30 DIAGNOSIS — Z00.00 ROUTINE ADULT HEALTH MAINTENANCE: Primary | ICD-10-CM

## 2019-08-30 DIAGNOSIS — C18.7 MALIGNANT NEOPLASM OF SIGMOID COLON (HCC): ICD-10-CM

## 2019-08-30 DIAGNOSIS — E78.2 MIXED HYPERLIPIDEMIA: ICD-10-CM

## 2019-08-30 DIAGNOSIS — I50.22 SYSTOLIC CHF, CHRONIC (HCC): ICD-10-CM

## 2019-08-30 DIAGNOSIS — I48.0 PAROXYSMAL ATRIAL FIBRILLATION (HCC): ICD-10-CM

## 2019-08-30 DIAGNOSIS — Z23 ENCOUNTER FOR IMMUNIZATION: ICD-10-CM

## 2019-08-30 PROBLEM — C18.9 COLON CANCER (HCC): Chronic | Status: ACTIVE | Noted: 2019-08-30

## 2019-08-30 NOTE — PROGRESS NOTES
Sarah Shankar is a 59 y.o. male who presents for evaluation of npv, cpe. Has never had pcp before. Has lived in [facility] now for about 4 years. Has been seeing dr Conor Hernandez since may, initially for pafib with rvr,and has been found to have nonischemic CM with ef 24%. Is tolerating amio, eliquis, lisinopril, toprol xl well. His wife is also my pt and brought him in today.       ROS:  Constitutional: negative for fevers, chills, anorexia and weight loss  Eyes:   negative for visual disturbance and irritation  ENT:   negative for tinnitus,sore throat,nasal congestion,ear pain,hoarseness  Respiratory:  negative for cough, hemoptysis, dyspnea,wheezing  CV:   negative for chest pain, palpitations, lower extremity edema  GI:   negative for nausea, vomiting, diarrhea, abdominal pain,melena  Genitourinary: negative for frequency, dysuria and hematuria  Musculoskel: negative for myalgias, arthralgias, back pain, muscle weakness, joint pain  Neurological:  negative for headaches, dizziness, focal weakness, numbness  Psychiatric:     Negative for depression or anxiety      Past Medical History:   Diagnosis Date    Atrial [address] ([facility]     Hypercholesterolemia     Hypertension        Past Surgical History:   Procedure Laterality Date    HX APPENDECTOMY  2011    HX COLECTOMY  09/2013    cancer    HX HERNIA REPAIR         Family History   Problem Relation Age of Onset    Cancer Mother     Coronary Artery Disease Father     Cancer Sister     Cancer Brother     Emphysema Brother        Social History     Socioeconomic History    Marital status:      Spouse name: Not on file    Number of children: Not on file    Years of education: Not on file    Highest education level: Not on file   Occupational History    Not on file   Social Needs    Financial resource strain: Not on file    Food insecurity:     Worry: Not on file     Inability: Not on file    Transportation needs:     Medical: Not on file     Non-medical: Not on file   Tobacco Use    Smoking status: Never Smoker    Smokeless tobacco: Never Used   Substance and Sexual Activity    Alcohol use: Yes     Frequency: Never     Comment: rarely    Drug use: Never    Sexual activity: Not Currently   Lifestyle    Physical activity:     Days per week: Not on file     Minutes per session: Not on file    Stress: Not on file   Relationships    Social connections:     Talks on phone: Not on file     Gets together: Not on file     Attends Zoroastrianism service: Not on file     Active member of club or organization: Not on file     Attends meetings of clubs or organizations: Not on file     Relationship status: Not on file    Intimate partner violence:     Fear of current or ex partner: Not on file     Emotionally abused: Not on file     Physically abused: Not on file     Forced sexual activity: Not on file   Other Topics Concern    Not on file   Social History Narrative    Not on file            Visit Vitals  /59 (BP 1 Location: Left arm, BP Patient Position: Sitting)   Pulse (!) 41   Temp 97.6 °F (36.4 °C) (Oral)   Resp 14   Ht 5' 8.5\" (1.74 m)   Wt 180 lb (81.6 kg)   SpO2 98%   BMI 26.97 kg/m²       Physical Examination:   General - Well appearing male  HEENT - PERRL, TM no erythema/opacification, normal nasal turbinates, no oropharyngeal erythema or exudate, MMM  Neck - supple, no bruits, no thyroidomegaly, no lymphadenopathy  Pulm - clear to auscultation bilaterally  Cardio - RRR, normal S1 S2, no murmur  Abd - soft, nontender, no masses, no HSM  Extrem - no edema, +2 distal pulses  Neuro-  No focal deficits, CN intact     Assessment/Plan:    1. Routine adult health maintenance--check hcv, psa, a1c  2. hyperlipids--last . On crestor now. Consider repatha if LDL remains elevated. 3.  Hx colon cancer--sp resection, sigmoid colon. Due for repeat colonoscopy, refer to dr Carrie Romano  4.   Nonischemic CM--ef 24%, on lisinopril, toprol xl. Has follow up with dr Radha Sharma next week. Consider entresto and/or lifevest    prevnar 13 given today.   rtc 6 months        Reyna Mansi III, DO

## 2019-08-30 NOTE — PROGRESS NOTES
Reviewed record in preparation for visit and have obtained necessary documentation. Identified pt with two pt identifiers(name and ). Chief Complaint   Patient presents with   [facility] Maintenance Due   Topic Date Due    Hepatitis C Screening  1955    Pneumococcal 0-64 years (1 of 1 - PPSV23) 1962    DTaP/Tdap/Td series (1 - Tdap) 1977    Shingrix Vaccine Age 50> (1 of 2) 2006    FOBT Q 1 YEAR AGE 50-75  2006    Influenza Age 5 to Adult  2019       Mr. Almonte has a reminder for a \"due or due soon\" health maintenance. I have asked that he discuss health maintenance topic(s) due with His  primary care provider. Coordination of Care Questionnaire:  :     1) Have you been to an emergency room, urgent care clinic since your last visit? no   Hospitalized since your last visit? no             2) Have you seen or consulted any other health care providers outside of [address] [place] [address] since your last visit? no  (Include any pap smears or colon screenings in this section.)    3) Do you have an Advance Directive on file? yes    4) Are you interested in receiving information on Advance Directives? NO    Patient is accompanied by self I have received verbal consent from Jenn Newman to discuss any/all medical information while they are present in the room.

## 2019-08-30 NOTE — PATIENT INSTRUCTIONS
Office Policies    Phone calls/patient messages:            Please allow up to 24 hours for someone in the office to contact you about your call or message. Be mindful your provider may be out of the office or your message may require further review. We encourage you to use [employer] for your messages as this is a faster, more efficient way to communicate with our office                         Medication Refills:            Prescription medications require 48-72 business hours to process. We encourage you to use [employer] for your refills. For controlled medications: Please allow 72 business hours to process. Certain medications may require you to  a written prescription at our office. NO narcotic/controlled medications will be prescribed after 4pm Monday through Friday or on weekends              Form/Paperwork Completion:            Please note a $25 fee may incur for all paperwork for completed by our providers. We ask that you allow 7-10 business days. Pre-payment is due prior to picking up/faxing the completed form. You may also download your forms to [employer] to have your doctor print off. Well Visit, Men 48 to 72: Care Instructions  Your Care Instructions    Physical exams can help you stay healthy. Your doctor has checked your overall health and may have suggested ways to take good care of yourself. He or she also may have recommended tests. At home, you can help prevent illness with healthy eating, regular exercise, and other steps. Follow-up care is a key part of your treatment and safety. Be sure to make and go to all appointments, and call your doctor if you are having problems. It's also a good idea to know your test results and keep a list of the medicines you take. How can you care for yourself at home? · Reach and stay at a healthy weight. This will lower your risk for many problems, such as obesity, diabetes, heart disease, and high blood pressure.   · Get at least 27 minutes of exercise on most days of the week. Walking is a good choice. You also may want to do other activities, such as running, swimming, cycling, or playing tennis or team sports. · Do not smoke. Smoking can make health problems worse. If you need help quitting, talk to your doctor about stop-smoking programs and medicines. These can increase your chances of quitting for good. · Protect your skin from too much sun. When you're outdoors from 10 a.m. to 4 p.m., stay in the shade or cover up with clothing and a hat with a wide brim. Wear sunglasses that block UV rays. Even when it's cloudy, put broad-spectrum sunscreen (SPF 30 or higher) on any exposed skin. · See a dentist one or two times a year for checkups and to have your teeth cleaned. · Wear a seat belt in the car. Follow your doctor's advice about when to have certain tests. These tests can spot problems early. · Cholesterol. Your doctor will tell you how often to have this done based on your overall health and other things that can increase your risk for heart attack and stroke. · Blood pressure. Have your blood pressure checked during a routine doctor visit. Your doctor will tell you how often to check your blood pressure based on your age, your blood pressure results, and other factors. · Prostate exam. Talk to your doctor about whether you should have a blood test (called a PSA test) for prostate cancer. Experts recommend that you discuss the benefits and risks of the test with your doctor before you decide whether to have this test.  · Diabetes. Ask your doctor whether you should have tests for diabetes. · Vision. Some experts recommend that you have yearly exams for glaucoma and other age-related eye problems starting at age 48. · Hearing. Tell your doctor if you notice any change in your hearing. You can have tests to find out how well you hear. · Colorectal cancer. Your risk for colorectal cancer gets higher as you get older.  Some experts say that adults should start regular screening at age 48 and stop at age 76. Others say to start before age 48 or continue after age 76. Talk with your doctor about your risk and when to start and stop screening. · Heart attack and stroke risk. At least every 4 to 6 years, you should have your risk for heart attack and stroke assessed. Your doctor uses factors such as your age, blood pressure, cholesterol, and whether you smoke or have diabetes to show what your risk for a heart attack or stroke is over the next 10 years. · Abdominal aortic aneurysm. Ask your doctor whether you should have a test to check for an aneurysm. You may need a test if you ever smoked or if your parent, brother, sister, or child has had an aneurysm. When should you call for help? Watch closely for changes in your health, and be sure to contact your doctor if you have any problems or symptoms that concern you. Where can you learn more? Go to http://kael-norbert.info/. Enter R918 in the search box to learn more about \"Well Visit, Men 48 to 72: Care Instructions. \"  Current as of: December 13, 2018  Content Version: 12.1  © 8413-7905 [employer], [employer]. Care instructions adapted under license by [employer] (which disclaims liability or warranty for this information). If you have questions about a medical condition or this instruction, always ask your healthcare professional. [address] any warranty or liability for your use of this information.

## 2019-08-31 LAB
EST. AVERAGE GLUCOSE BLD GHB EST-MCNC: 120 MG/DL
HBA1C MFR BLD: 5.8 % (ref 4.8–5.6)
HCV AB S/CO SERPL IA: <0.1 S/CO RATIO (ref 0–0.9)
HCV AB SERPL QL IA: NORMAL
PSA SERPL-MCNC: 1.1 NG/ML (ref 0–4)

## 2019-09-02 NOTE — PROGRESS NOTES
Is pre/borderline diabetes, a1c 5.8 for average sugar of 120. Work on cutting back on carbs/sraches, stay active. Other labs are fine.

## 2019-09-06 ENCOUNTER — TELEPHONE (OUTPATIENT)
Dept: CARDIOLOGY CLINIC | Age: 64
End: 2019-09-06

## 2019-09-06 NOTE — TELEPHONE ENCOUNTER
----- Message from Hernán Boyle NP sent at 9/5/2019  8:27 AM EDT -----  Rosy Slaeh,    Please call the patient with good news! EF has improved to 50-55%, no heart failure concerns anymore. It appears he did not need cardioversion as amiodarone restored sinus rhythm. He can d/c the LifeVest now, but he needs to stay on all the medications we started him on. Keep f/u appt with Dr. Belinda Schroeder as scheduled.     Thanks,  [employer]

## 2019-09-09 ENCOUNTER — OFFICE VISIT (OUTPATIENT)
Dept: CARDIOLOGY CLINIC | Age: 64
End: 2019-09-09

## 2019-09-09 VITALS
WEIGHT: 180.9 LBS | BODY MASS INDEX: 27.41 KG/M2 | DIASTOLIC BLOOD PRESSURE: 70 MMHG | SYSTOLIC BLOOD PRESSURE: 120 MMHG | HEIGHT: 68 IN | RESPIRATION RATE: 16 BRPM | HEART RATE: 54 BPM | OXYGEN SATURATION: 97 %

## 2019-09-09 DIAGNOSIS — E78.2 MIXED HYPERLIPIDEMIA: ICD-10-CM

## 2019-09-09 DIAGNOSIS — I43 TACHYCARDIA-INDUCED CARDIOMYOPATHY (HCC): Primary | ICD-10-CM

## 2019-09-09 DIAGNOSIS — R00.0 TACHYCARDIA-INDUCED CARDIOMYOPATHY (HCC): Primary | ICD-10-CM

## 2019-09-09 DIAGNOSIS — I10 ESSENTIAL HYPERTENSION: ICD-10-CM

## 2019-09-09 DIAGNOSIS — I42.8 NICM (NONISCHEMIC CARDIOMYOPATHY) (HCC): ICD-10-CM

## 2019-09-09 DIAGNOSIS — I48.0 PAROXYSMAL ATRIAL FIBRILLATION (HCC): ICD-10-CM

## 2019-09-09 NOTE — PROGRESS NOTES
1. Have you been to the ER, urgent care clinic since your last visit? Hospitalized since your last visit? No    2. Have you seen or consulted any other health care providers outside of the [address] [place] [address] since your last visit? Include any pap smears or colon screening.  No    Chief Complaint   Patient presents with    Follow-up    Palpitations       Patient has question about medications if to continue Eliquis will need refill

## 2019-09-09 NOTE — PROGRESS NOTES
[address] [place] [address], [address] [facility]  [phone]     Subjective:      Alessandra Brown is a 59 y.o. male is here for routine f/u. The patient denies chest pain/ shortness of breath, orthopnea, PND, LE edema, palpitations, syncope, or presyncope.        Patient Active Problem List    Diagnosis Date Noted    Mixed hyperlipidemia 08/30/2019    Nonischemic cardiomyopathy ([facility] 34/96/3646    Systolic CHF, chronic ([facility] 08/30/2019    Colon cancer ([facility] 08/30/2019    Atrial fibrillation ([facility] 06/03/2019      Reena Wheeler DO  Past Medical History:   Diagnosis Date    Atrial fibrillation ([facility] [address]     Hypercholesterolemia     Hypertension       Past Surgical History:   Procedure Laterality Date    HX APPENDECTOMY  2011    HX COLECTOMY  09/2013    cancer    HX HERNIA REPAIR       No Known Allergies   Family History   Problem Relation Age of Onset    Cancer Mother     Coronary Artery Disease Father     Cancer Sister     Cancer Brother     Emphysema Brother       Social History     Socioeconomic History    Marital status:      Spouse name: Not on file    Number of children: Not on file    Years of education: Not on file    Highest education level: Not on file   Occupational History    Not on file   Social Needs    Financial resource strain: Not on file    Food insecurity:     Worry: Not on file     Inability: Not on file    Transportation needs:     Medical: Not on file     Non-medical: Not on file   Tobacco Use    Smoking status: Never Smoker    Smokeless tobacco: Never Used   Substance and Sexual Activity    Alcohol use: Yes     Frequency: Never     Comment: rarely    Drug use: Never    Sexual activity: Not Currently   Lifestyle    Physical activity:     Days per week: Not on file     Minutes per session: Not on file    Stress: Not on file   Relationships    Social connections:     Talks on phone: Not on file     Gets together: Not on file Attends Buddhism service: Not on file     Active member of club or organization: Not on file     Attends meetings of clubs or organizations: Not on file     Relationship status: Not on file    Intimate partner violence:     Fear of current or ex partner: Not on file     Emotionally abused: Not on file     Physically abused: Not on file     Forced sexual activity: Not on file   Other Topics Concern    Not on file   Social History Narrative    Not on file      Current Outpatient Medications   Medication Sig    apixaban (ELIQUIS) 5 mg tablet Take 1 Tab by mouth two (2) times a day.  rosuvastatin (CRESTOR) 20 mg tablet Take 1 Tab by mouth nightly. Indications: high cholesterol    metoprolol succinate (TOPROL-XL) 50 mg XL tablet Take 1 Tab by mouth daily. To help prevent atrial fib- decreased 7/23/19    amiodarone (CORDARONE) 200 mg tablet Take 1 Tab by mouth daily. 1 tablet p.o. twice daily for 2 weeks, then decrease to 200 mg daily (Patient taking differently: Take 200 mg by mouth daily.)    lisinopril (PRINIVIL, ZESTRIL) 2.5 mg tablet Take 1 Tab by mouth daily. No current facility-administered medications for this visit. Review of Symptoms:  11 systems reviewed, negative other than as stated in the HPI    Physical ExamPhysical Exam:    Vitals:    09/09/19 1348   BP: 120/70   Pulse: (!) 54   Resp: 16   SpO2: 97%   Weight: 180 lb 14.4 oz (82.1 kg)   Height: 5' 8\" (1.727 m)     Body mass index is 27.51 kg/m². General PE  Gen:  NAD  Mental Status - Alert. General Appearance - Not in acute distress. HEENT:  PERRL, no carotid bruits or JVD  Chest and Lung Exam   Inspection: Accessory muscles - No use of accessory muscles in breathing. Auscultation:   Breath sounds: - Normal.   Cardiovascular   Inspection: Jugular vein - Bilateral - Inspection Normal.   Palpation/Percussion:   Apical Impulse: - Normal.   Auscultation: Rhythm - Regular. Heart Sounds - S1 WNL and S2 WNL. No S3 or S4.    Murmurs & Other Heart Sounds: Auscultation of the heart reveals - No Murmurs. Peripheral Vascular   Upper Extremity: Inspection - Bilateral - No Cyanotic nailbeds or Digital clubbing. Lower Extremity:   Palpation: Edema - Bilateral - No edema. Abdomen:   Soft, non-tender, bowel sounds are active. Neuro: A&O times 3, CN and motor grossly WNL    Labs:   Lab Results   Component Value Date/Time    Cholesterol, total 300 (H) 05/23/2019 10:04 AM    HDL Cholesterol 51 05/23/2019 10:04 AM    LDL, calculated 220 (H) 05/23/2019 10:04 AM    Triglyceride 143 05/23/2019 10:04 AM     No results found for: CPK, CPKX, CPX  Lab Results   Component Value Date/Time    Sodium 142 07/08/2019 02:46 PM    Potassium 4.8 07/08/2019 02:46 PM    Chloride 102 07/08/2019 02:46 PM    CO2 24 07/08/2019 02:46 PM    Glucose 87 07/08/2019 02:46 PM    BUN 22 07/08/2019 02:46 PM    Creatinine 1.34 (H) 07/08/2019 02:46 PM    BUN/Creatinine ratio 16 07/08/2019 02:46 PM    GFR est AA 65 07/08/2019 02:46 PM    GFR est non-AA 56 (L) 07/08/2019 02:46 PM    Calcium 9.9 07/08/2019 02:46 PM    Bilirubin, total 1.0 05/23/2019 10:04 AM    AST (SGOT) 19 05/23/2019 10:04 AM    Alk. phosphatase 82 05/23/2019 10:04 AM    Protein, total 7.5 05/23/2019 10:04 AM    Albumin 4.6 05/23/2019 10:04 AM    A-G Ratio 1.6 05/23/2019 10:04 AM    ALT (SGPT) 14 05/23/2019 10:04 AM     Declined EKG today, normal heart rate, NSR by auscultation     Assessment:      1. Tachycardia-induced cardiomyopathy (HCC)    2. Paroxysmal atrial fibrillation ([address]    3. NICM (nonischemic cardiomyopathy) ([address]    4. Mixed hyperlipidemia    5. Essential hypertension        Orders Placed This Encounter    LIPID PANEL    METABOLIC PANEL, COMPREHENSIVE    CK    apixaban (ELIQUIS) 5 mg tablet     Sig: Take 1 Tab by mouth two (2) times a day. Dispense:  90 Tab     Refill:  4        Plan:     1.  Paroxysmal atrial fibrillation (HCC)  Currently on Toprol (dose decreased on 7/23/2019 due to bradycardia) for rate control and Eliquis for anti-coagulation  Converted spontaneously to normal sinus rhythm with amiodarone, prior to cardioversion. Remains in normal sinus rhythm, now with resolution of nonischemic cardiomyopathy. Eliquis represcribed today 90 with 4 refills  Discussed risks and benefits of anticoagulation, signs and symptoms of bleeding, and to call if any concerns. 2. Dilated cardiomyopathy ([address]  Echo done 5/2019 with reduced LVEF 15-20%, mildly dilated LA and mild-mod MR  Lexiscan stress test done 6/2019 without evidence of ischemia  Likely tachycardia-induced cardiomyopathy which is now resolved with restoration of normal sinus rhythm. Echo 9/3/2019 shows LVEF 50 to 55%, moderately dilated left atrium  Continue with Toprol and Lisinopril for medical management       3. Mixed hyperlipidemia   in 5/2019; Crestor 20 mg daily started. Repeat lipids-lab slip ordered on 9/9/2019  High likelihood of familial HLD given elevated LDL > 190    4. Agitation at patient check in today 9/9/2018:  Patient became upset that he had to provide address, emergency contact information, etc. which had already been provided. We explained that this was for confirmation and that this was part of our standard process. He became upset, agitated, refused to provide the information initially and was confrontational with our staff. I told the patient that this is her standard procedure, and that if he wishes, he can confirm his information which we will handed to him on a piece of paper confidentially and we will have him initial that the information is correct. He expressed that this is an acceptable solution. He was advised that it is not acceptable for him to become confrontational with our staff, and to please treat our staff with courtesy. Follow up in 1 year, sooner as needed.      Jagdish Oconnor MD

## 2019-09-13 ENCOUNTER — TELEPHONE (OUTPATIENT)
Dept: CARDIOLOGY CLINIC | Age: 64
End: 2019-09-13

## 2019-12-13 PROCEDURE — 99284 EMERGENCY DEPT VISIT MOD MDM: CPT

## 2019-12-14 ENCOUNTER — HOSPITAL ENCOUNTER (EMERGENCY)
Age: 64
Discharge: HOME | End: 2019-12-14
Attending: EMERGENCY MEDICINE
Payer: COMMERCIAL

## 2019-12-14 ENCOUNTER — APPOINTMENT (OUTPATIENT)
Dept: CT IMAGING | Age: 64
End: 2019-12-14
Attending: EMERGENCY MEDICINE
Payer: COMMERCIAL

## 2019-12-14 VITALS
OXYGEN SATURATION: 95 % | WEIGHT: 180 LBS | TEMPERATURE: 98.3 F | BODY MASS INDEX: 27.28 KG/M2 | HEIGHT: 68 IN | RESPIRATION RATE: 20 BRPM | DIASTOLIC BLOOD PRESSURE: 43 MMHG | HEART RATE: 55 BPM | SYSTOLIC BLOOD PRESSURE: 117 MMHG

## 2019-12-14 DIAGNOSIS — M54.50 ACUTE RIGHT-SIDED LOW BACK PAIN WITHOUT SCIATICA: Primary | ICD-10-CM

## 2019-12-14 LAB
ALBUMIN SERPL-MCNC: 3.9 G/DL (ref 3.5–5)
ALBUMIN/GLOB SERPL: 1 {RATIO} (ref 1.1–2.2)
ALP SERPL-CCNC: 79 U/L (ref 45–117)
ALT SERPL-CCNC: 37 U/L (ref 12–78)
ANION GAP SERPL CALC-SCNC: 6 MMOL/L (ref 5–15)
APPEARANCE UR: ABNORMAL
AST SERPL-CCNC: 21 U/L (ref 15–37)
BACTERIA URNS QL MICRO: NEGATIVE /HPF
BASOPHILS # BLD: 0 K/UL (ref 0–0.1)
BASOPHILS NFR BLD: 1 % (ref 0–1)
BILIRUB SERPL-MCNC: 0.8 MG/DL (ref 0.2–1)
BILIRUB UR QL: NEGATIVE
BUN SERPL-MCNC: 21 MG/DL (ref 6–20)
BUN/CREAT SERPL: 14 (ref 12–20)
CALCIUM SERPL-MCNC: 9.2 MG/DL (ref 8.5–10.1)
CHLORIDE SERPL-SCNC: 107 MMOL/L (ref 97–108)
CO2 SERPL-SCNC: 25 MMOL/L (ref 21–32)
COLOR UR: ABNORMAL
CREAT SERPL-MCNC: 1.46 MG/DL (ref 0.7–1.3)
DIFFERENTIAL METHOD BLD: ABNORMAL
EOSINOPHIL # BLD: 0 K/UL (ref 0–0.4)
EOSINOPHIL NFR BLD: 0 % (ref 0–7)
EPITH CASTS URNS QL MICRO: ABNORMAL /LPF
ERYTHROCYTE [DISTWIDTH] IN BLOOD BY AUTOMATED COUNT: 12 % (ref 11.5–14.5)
GLOBULIN SER CALC-MCNC: 4 G/DL (ref 2–4)
GLUCOSE SERPL-MCNC: 140 MG/DL (ref 65–100)
GLUCOSE UR STRIP.AUTO-MCNC: NEGATIVE MG/DL
HCT VFR BLD AUTO: 44.4 % (ref 36.6–50.3)
HGB BLD-MCNC: 14.2 G/DL (ref 12.1–17)
HGB UR QL STRIP: NEGATIVE
IMM GRANULOCYTES # BLD AUTO: 0 K/UL (ref 0–0.04)
IMM GRANULOCYTES NFR BLD AUTO: 1 % (ref 0–0.5)
KETONES UR QL STRIP.AUTO: NEGATIVE MG/DL
LEUKOCYTE ESTERASE UR QL STRIP.AUTO: NEGATIVE
LIPASE SERPL-CCNC: 115 U/L (ref 73–393)
LYMPHOCYTES # BLD: 1 K/UL (ref 0.8–3.5)
LYMPHOCYTES NFR BLD: 13 % (ref 12–49)
MCH RBC QN AUTO: 30.2 PG (ref 26–34)
MCHC RBC AUTO-ENTMCNC: 32 G/DL (ref 30–36.5)
MCV RBC AUTO: 94.5 FL (ref 80–99)
MONOCYTES # BLD: 0.2 K/UL (ref 0–1)
MONOCYTES NFR BLD: 3 % (ref 5–13)
MUCOUS THREADS URNS QL MICRO: ABNORMAL /LPF
NEUTS SEG # BLD: 6.9 K/UL (ref 1.8–8)
NEUTS SEG NFR BLD: 82 % (ref 32–75)
NITRITE UR QL STRIP.AUTO: NEGATIVE
NRBC # BLD: 0 K/UL (ref 0–0.01)
NRBC BLD-RTO: 0 PER 100 WBC
PH UR STRIP: 5 [PH] (ref 5–8)
PLATELET # BLD AUTO: 195 K/UL (ref 150–400)
PMV BLD AUTO: 11.7 FL (ref 8.9–12.9)
POTASSIUM SERPL-SCNC: 4 MMOL/L (ref 3.5–5.1)
PROT SERPL-MCNC: 7.9 G/DL (ref 6.4–8.2)
PROT UR STRIP-MCNC: NEGATIVE MG/DL
RBC # BLD AUTO: 4.7 M/UL (ref 4.1–5.7)
RBC #/AREA URNS HPF: ABNORMAL /HPF (ref 0–5)
SODIUM SERPL-SCNC: 138 MMOL/L (ref 136–145)
SP GR UR REFRACTOMETRY: 1.02 (ref 1–1.03)
UA: UC IF INDICATED,UAUC: ABNORMAL
UROBILINOGEN UR QL STRIP.AUTO: 0.2 EU/DL (ref 0.2–1)
WBC # BLD AUTO: 8.3 K/UL (ref 4.1–11.1)
WBC URNS QL MICRO: ABNORMAL /HPF (ref 0–4)

## 2019-12-14 PROCEDURE — 74011000250 HC RX REV CODE- 250: Performed by: EMERGENCY MEDICINE

## 2019-12-14 PROCEDURE — 74011636320 HC RX REV CODE- 636/320: Performed by: EMERGENCY MEDICINE

## 2019-12-14 PROCEDURE — 81001 URINALYSIS AUTO W/SCOPE: CPT

## 2019-12-14 PROCEDURE — 96374 THER/PROPH/DIAG INJ IV PUSH: CPT

## 2019-12-14 PROCEDURE — 83690 ASSAY OF LIPASE: CPT

## 2019-12-14 PROCEDURE — 85025 COMPLETE CBC W/AUTO DIFF WBC: CPT

## 2019-12-14 PROCEDURE — 74177 CT ABD & PELVIS W/CONTRAST: CPT

## 2019-12-14 PROCEDURE — 74011250636 HC RX REV CODE- 250/636: Performed by: EMERGENCY MEDICINE

## 2019-12-14 PROCEDURE — 36415 COLL VENOUS BLD VENIPUNCTURE: CPT

## 2019-12-14 PROCEDURE — 80053 COMPREHEN METABOLIC PANEL: CPT

## 2019-12-14 PROCEDURE — 96375 TX/PRO/DX INJ NEW DRUG ADDON: CPT

## 2019-12-14 RX ORDER — SODIUM CHLORIDE 0.9 % (FLUSH) 0.9 %
10 SYRINGE (ML) INJECTION
Status: COMPLETED | OUTPATIENT
Start: 2019-12-14 | End: 2019-12-14

## 2019-12-14 RX ORDER — LIDOCAINE 4 G/100G
1 PATCH TOPICAL EVERY 24 HOURS
Status: DISCONTINUED | OUTPATIENT
Start: 2019-12-14 | End: 2019-12-14 | Stop reason: HOSPADM

## 2019-12-14 RX ORDER — MORPHINE SULFATE 2 MG/ML
4 INJECTION, SOLUTION INTRAMUSCULAR; INTRAVENOUS ONCE
Status: COMPLETED | OUTPATIENT
Start: 2019-12-14 | End: 2019-12-14

## 2019-12-14 RX ORDER — KETOROLAC TROMETHAMINE 30 MG/ML
15 INJECTION, SOLUTION INTRAMUSCULAR; INTRAVENOUS
Status: COMPLETED | OUTPATIENT
Start: 2019-12-14 | End: 2019-12-14

## 2019-12-14 RX ORDER — IBUPROFEN 600 MG/1
600 TABLET ORAL
Qty: 20 TAB | Refills: 0 | Status: SHIPPED | OUTPATIENT
Start: 2019-12-14 | End: 2020-10-28

## 2019-12-14 RX ORDER — CYCLOBENZAPRINE HCL 10 MG
10 TABLET ORAL
Qty: 10 TAB | Refills: 0 | Status: SHIPPED | OUTPATIENT
Start: 2019-12-14 | End: 2019-12-17

## 2019-12-14 RX ADMIN — IOHEXOL 50 ML: 240 INJECTION, SOLUTION INTRATHECAL; INTRAVASCULAR; INTRAVENOUS; ORAL at 04:20

## 2019-12-14 RX ADMIN — Medication 10 ML: at 05:38

## 2019-12-14 RX ADMIN — IOPAMIDOL 100 ML: 755 INJECTION, SOLUTION INTRAVENOUS at 05:38

## 2019-12-14 RX ADMIN — KETOROLAC TROMETHAMINE 15 MG: 30 INJECTION, SOLUTION INTRAMUSCULAR at 06:39

## 2019-12-14 RX ADMIN — MORPHINE SULFATE 4 MG: 2 INJECTION, SOLUTION INTRAMUSCULAR; INTRAVENOUS at 04:33

## 2019-12-14 NOTE — ED NOTES
Pt comes to ED from home with c/o lower back pain. He states last Wednesday he felt a \"pop. \" He states during his colonoscopy this Wednesday, his pain increased. He denies injury or trauma.

## 2019-12-14 NOTE — ED PROVIDER NOTES
EMERGENCY DEPARTMENT HISTORY AND PHYSICAL EXAM      Date: 12/14/2019  Patient Name: Royce Morfin    History of Presenting Illness     Chief Complaint   Patient presents with    Back Pain     pt went bowling 1 week ago. Pt states he felt something pop in his lower right back. Pt states he had a colonoscopy wednesday and not had a BM since. denies vomiting. History Provided By: Patient    HPI: Royce Morfin, 62 y.o. male with history of atrial fibrillation, hypercholesterolemia, hypertension presents to the ED with cc of right lumbar pain. Symptoms onset over the past week after patient was bowling. He felt a twinge in the right low back which got worse over the past week gradually. Symptoms worse with positional changes and with staying in 1 position for too long. He then had a routine colonoscopy 3 days ago and since then his symptoms have gotten much worse. He complains of pain to the right flank and right low back without radiation. Denies any abdominal pain, nausea, vomiting, bloody stool, urinary symptoms. Denies any fevers. There are no other complaints, changes, or physical findings at this time. PCP: Cesilia Abernathy, DO    No current facility-administered medications on file prior to encounter. Current Outpatient Medications on File Prior to Encounter   Medication Sig Dispense Refill    rosuvastatin (CRESTOR) 20 mg tablet Take 1 Tab by mouth nightly. Indications: high cholesterol 90 Tab 3    metoprolol succinate (TOPROL-XL) 50 mg XL tablet Take 1 Tab by mouth daily. To help prevent atrial fib- decreased 7/23/19 90 Tab 4    amiodarone (CORDARONE) 200 mg tablet Take 1 Tab by mouth daily. 1 tablet p.o. twice daily for 2 weeks, then decrease to 200 mg daily (Patient taking differently: Take 200 mg by mouth daily.) 90 Tab 1    lisinopril (PRINIVIL, ZESTRIL) 2.5 mg tablet Take 1 Tab by mouth daily.  90 Tab 3    apixaban (ELIQUIS) 5 mg tablet Take 1 Tab by mouth two (2) times a day. 80 Tab 4       Past History     Past Medical History:  Past Medical History:   Diagnosis Date    Atrial fibrillation ([address]     Hypercholesterolemia     Hypertension        Past Surgical History:  Past Surgical History:   Procedure Laterality Date    HX APPENDECTOMY  2011    HX COLECTOMY  09/2013    cancer    HX HERNIA REPAIR         Family History:  Family History   Problem Relation Age of Onset    Cancer Mother     Coronary Artery Disease Father     Cancer Sister     Cancer Brother     Emphysema Brother        Social History:  Social History     Tobacco Use    Smoking status: Never Smoker    Smokeless tobacco: Never Used   Substance Use Topics    Alcohol use: Yes     Frequency: Never     Comment: rarely    Drug use: Never       Allergies:  No Known Allergies      Review of Systems   Review of Systems   Constitutional: Negative for chills and fever. HENT: Negative. Eyes: Negative for visual disturbance. Respiratory: Negative for cough and shortness of breath. Cardiovascular: Negative for chest pain and leg swelling. Gastrointestinal: Negative for abdominal pain, nausea and vomiting. Genitourinary: Negative. Musculoskeletal: Positive for back pain. Negative for gait problem. Skin: Negative for color change and rash. Neurological: Negative for dizziness, weakness, light-headedness and headaches. Hematological: Does not bruise/bleed easily. All other systems reviewed and are negative. Physical Exam   Physical Exam  Vitals signs and nursing note reviewed. Constitutional:       General: He is not in acute distress. Appearance: Normal appearance. He is not ill-appearing or toxic-appearing. HENT:      Head: Normocephalic and atraumatic. Nose: Nose normal.      Mouth/Throat:      Mouth: Mucous membranes are moist.   Eyes:      Extraocular Movements: Extraocular movements intact. Pupils: Pupils are equal, round, and reactive to light.    Neck: Musculoskeletal: Normal range of motion and neck supple. Cardiovascular:      Rate and Rhythm: Normal rate and regular rhythm. Heart sounds: No murmur. Pulmonary:      Effort: Pulmonary effort is normal. No respiratory distress. Breath sounds: Normal breath sounds. No wheezing. Abdominal:      General: There is no distension. Palpations: Abdomen is soft. Tenderness: There is no tenderness. There is no guarding or rebound. Musculoskeletal: Normal range of motion. General: No swelling or tenderness. Right lower leg: No edema. Left lower leg: No edema. Comments: There is paraspinal right lumbar tenderness palpation without spasm or step-off or deformity   Skin:     General: Skin is warm and dry. Coloration: Skin is not pale. Findings: No erythema. Neurological:      General: No focal deficit present. Mental Status: He is alert and oriented to person, place, and time. Comments: Neurovascularly intact bilateral lower extremities         Diagnostic Study Results     Labs -     Recent Results (from the past 12 hour(s))   URINALYSIS W/ REFLEX CULTURE    Collection Time: 12/14/19  4:04 AM   Result Value Ref Range    Color YELLOW/STRAW      Appearance CLOUDY (A) CLEAR      Specific gravity 1.020 1.003 - 1.030      pH (UA) 5.0 5.0 - 8.0      Protein NEGATIVE  NEG mg/dL    Glucose NEGATIVE  NEG mg/dL    Ketone NEGATIVE  NEG mg/dL    Bilirubin NEGATIVE  NEG      Blood NEGATIVE  NEG      Urobilinogen 0.2 0.2 - 1.0 EU/dL    Nitrites NEGATIVE  NEG      Leukocyte Esterase NEGATIVE  NEG      WBC 0-4 0 - 4 /hpf    RBC 0-5 0 - 5 /hpf    Epithelial cells FEW FEW /lpf    Bacteria NEGATIVE  NEG /hpf    UA:UC IF INDICATED CULTURE NOT INDICATED BY UA RESULT CNI      Mucus 3+ (A) NEG /lpf   CBC WITH AUTOMATED DIFF    Collection Time: 12/14/19  4:10 AM   Result Value Ref Range    WBC 8.3 4.1 - 11.1 K/uL    RBC 4.70 4. 10 - 5.70 M/uL    HGB 14.2 12.1 - 17.0 g/dL    HCT 44.4 36.6 - 50.3 %    MCV 94.5 80.0 - 99.0 FL    MCH 30.2 26.0 - 34.0 PG    MCHC 32.0 30.0 - 36.5 g/dL    RDW 12.0 11.5 - 14.5 %    PLATELET 480 054 - 738 K/uL    MPV 11.7 8.9 - 12.9 FL    NRBC 0.0 0  WBC    ABSOLUTE NRBC 0.00 0.00 - 0.01 K/uL    NEUTROPHILS 82 (H) 32 - 75 %    LYMPHOCYTES 13 12 - 49 %    MONOCYTES 3 (L) 5 - 13 %    EOSINOPHILS 0 0 - 7 %    BASOPHILS 1 0 - 1 %    IMMATURE GRANULOCYTES 1 (H) 0.0 - 0.5 %    ABS. NEUTROPHILS 6.9 1.8 - 8.0 K/UL    ABS. LYMPHOCYTES 1.0 0.8 - 3.5 K/UL    ABS. MONOCYTES 0.2 0.0 - 1.0 K/UL    ABS. EOSINOPHILS 0.0 0.0 - 0.4 K/UL    ABS. BASOPHILS 0.0 0.0 - 0.1 K/UL    ABS. IMM. GRANS. 0.0 0.00 - 0.04 K/UL    DF AUTOMATED     METABOLIC PANEL, COMPREHENSIVE    Collection Time: 12/14/19  4:10 AM   Result Value Ref Range    Sodium 138 136 - 145 mmol/L    Potassium 4.0 3.5 - 5.1 mmol/L    Chloride 107 97 - 108 mmol/L    CO2 25 21 - 32 mmol/L    Anion gap 6 5 - 15 mmol/L    Glucose 140 (H) 65 - 100 mg/dL    BUN 21 (H) 6 - 20 MG/DL    Creatinine 1.46 (H) 0.70 - 1.30 MG/DL    BUN/Creatinine ratio 14 12 - 20      GFR est AA 59 (L) >60 ml/min/1.73m2    GFR est non-AA 49 (L) >60 ml/min/1.73m2    Calcium 9.2 8.5 - 10.1 MG/DL    Bilirubin, total 0.8 0.2 - 1.0 MG/DL    ALT (SGPT) 37 12 - 78 U/L    AST (SGOT) 21 15 - 37 U/L    Alk. phosphatase 79 45 - 117 U/L    Protein, total 7.9 6.4 - 8.2 g/dL    Albumin 3.9 3.5 - 5.0 g/dL    Globulin 4.0 2.0 - 4.0 g/dL    A-G Ratio 1.0 (L) 1.1 - 2.2     LIPASE    Collection Time: 12/14/19  4:10 AM   Result Value Ref Range    Lipase 115 73 - 393 U/L       Radiologic Studies -   CT ABD PELV W CONT   Final Result   IMPRESSION:      1. No evidence of pneumoperitoneum, free fluid, or retroperitoneal hematoma as   questioned clinically. 2. Surgical anastomosis sigmoid colon. No bowel obstruction or bowel wall   thickening.         CT Results  (Last 48 hours)               12/14/19 0538  CT ABD PELV W CONT Final result    Impression:  IMPRESSION: 1. No evidence of pneumoperitoneum, free fluid, or retroperitoneal hematoma as   questioned clinically. 2. Surgical anastomosis sigmoid colon. No bowel obstruction or bowel wall   thickening. Narrative:  INDICATION: R flank pain after colonoscopy, concern for perforation vs RP   hematoma       COMPARISON: None       TECHNIQUE:    Following the uneventful intravenous administration of IV contrast, thin axial   images were obtained through the abdomen and pelvis. Coronal and sagittal   reconstructions were generated. CT dose reduction was achieved through use of a   standardized protocol tailored for this examination and automatic exposure   control for dose modulation. FINDINGS:    LUNG BASES: No abnormality. LIVER: No mass or biliary dilatation. GALLBLADDER: Unremarkable. SPLEEN: No enlargement or lesion. 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 PANCREAS: No mass or ductal dilatation. ADRENALS: No mass. KIDNEYS: No mass, calculus, or hydronephrosis. GI TRACT: Sigmoid anastomosis. No bowel wall thickening or obstruction   PERITONEUM: No free air or free fluid. APPENDIX: Surgically absent. RETROPERITONEUM: No aortic aneurysm. No evidence of hemorrhage. LYMPH NODES: None enlarged. ADDITIONAL COMMENTS: N/A.       URINARY BLADDER: Unremarkable. REPRODUCTIVE ORGANS: Unremarkable. LYMPH NODES: None enlarged. FREE FLUID: None. BONES: No destructive bone lesion. ADDITIONAL COMMENTS: N/A. CXR Results  (Last 48 hours)    None          Medical Decision Making   I am the first provider for this patient. I reviewed the vital signs, available nursing notes, past medical history, past surgical history, family history and social history. Vital Signs-Reviewed the patient's vital signs.   Patient Vitals for the past 12 hrs:   Temp Pulse Resp BP SpO2   12/14/19 0615    117/43 95 %   12/14/19 0445    153/70 100 %   12/14/19 0437     99 %   12/14/19 0436    159/76    12/14/19 0010 98.3 °F (36.8 °C) (!) 55 20 161/70 100 %     Records Reviewed: Nursing Notes, Old Medical Records, Previous Radiology Studies and Previous Laboratory Studies    Provider Notes (Medical Decision Making): This is a 71-year-old male here with right lumbar strain. On exam he is in mild distress due to pain. He is afebrile vital signs stable through entire ED stay. Given that his symptoms got significantly worse after having a colonoscopy I had concern for RP hematoma or pneumoperitoneum. Therefore blood work was obtained and CT imaging as well which was unremarkable. Patient's pain improved with administration of morphine, lidocaine patch, Toradol in the ED. He is stable for discharge home with outpatient follow-up. He does not have any red flags concerning for cauda equina syndrome or spinal cord etiology. He was given strict return ED precautions. He agrees plan as above and is discharged home in stable condition. ED Course:   Initial assessment performed. The patients presenting problems have been discussed, and they are in agreement with the care plan formulated and outlined with them. I have encouraged them to ask questions as they arise throughout their visit. Discharge Note:  The patient has been re-evaluated and is ready for discharge. Reviewed available results with patient. Counseled patient on diagnosis and care plan. Patient has expressed understanding, and all questions have been answered. Patient agrees with plan and agrees to follow up as recommended, or to return to the ED if their symptoms worsen. Discharge instructions have been provided and explained to the patient, along with reasons to return to the ED. Critical Care Time:   0    Disposition:  Home    PLAN:  1.    Discharge Medication List as of 12/14/2019  6:25 AM      START taking these medications    Details   ibuprofen (MOTRIN) 600 mg tablet Take 1 Tab by mouth every six (6) hours as needed for Pain., Print, Disp-20 Tab, R-0      cyclobenzaprine (FLEXERIL) 10 mg tablet Take 1 Tab by mouth three (3) times daily as needed for Muscle Spasm(s). , Print, Disp-10 Tab, R-0         CONTINUE these medications which have NOT CHANGED    Details   rosuvastatin (CRESTOR) 20 mg tablet Take 1 Tab by mouth nightly. Indications: high cholesterol, Normal, Disp-90 Tab, R-3      metoprolol succinate (TOPROL-XL) 50 mg XL tablet Take 1 Tab by mouth daily. To help prevent atrial fib- decreased 7/23/19, Normal, Disp-90 Tab, R-4      amiodarone (CORDARONE) 200 mg tablet Take 1 Tab by mouth daily. 1 tablet p.o. twice daily for 2 weeks, then decrease to 200 mg daily, Normal, Disp-90 Tab, R-1      lisinopril (PRINIVIL, ZESTRIL) 2.5 mg tablet Take 1 Tab by mouth daily. , Normal, Disp-90 Tab, R-3      apixaban (ELIQUIS) 5 mg tablet Take 1 Tab by mouth two (2) times a day., Normal, Disp-90 Tab, R-4           2. Follow-up Information     Follow up With Specialties Details Why Contact Info    Bell Andrade, DO Internal Medicine Call in 3 days As needed, If symptoms worsen [address] [facility]  [phone]          Return to ED if worse     Diagnosis     Clinical Impression:   1. Acute right-sided low back pain without sciatica        Attestations:    Mckenna Salazar MD    Please note that this dictation was completed with Inbox, the my[employer] voice recognition software. Quite often unanticipated grammatical, syntax, homophones, and other interpretive errors are inadvertently transcribed by the computer software. Please disregard these errors. Please excuse any errors that have escaped final proofreading. Thank you.

## 2019-12-14 NOTE — ED NOTES
Patient (s)  given copy of dc instructions and 2 script(s). Patient (s)  verbalized understanding of instructions and script (s). Patient given a current medication reconciliation form and verbalized understanding of their medications. Patient (s) verbalized understanding of the importance of discussing medications with  his or her physician or clinic they will be following up with. Patient alert and oriented and in no acute distress. Patient discharged home ambulatory with all belongings and wife.

## 2019-12-14 NOTE — DISCHARGE INSTRUCTIONS
You were evaluated in the emergency department for low back pain. Your examination was reassuring as was your work-up including blood work, urinalysis, and CT scan. It will be important for you to follow-up with your primary care physician in 3-5 days. If you develop worsening symptoms such as chest pain, abdominal pain, or numbness/weakness in your legs, or difficulty urinating or having a bowel movement, please return to the emergency department immediately. You can use ibuprofen 600mg every 6 hours, Tylenol 1000mg every 6 hours, heating pads, lidocaine patches to help with symptoms.

## 2019-12-16 ENCOUNTER — TELEPHONE (OUTPATIENT)
Dept: INTERNAL MEDICINE CLINIC | Age: 64
End: 2019-12-16

## 2019-12-16 NOTE — TELEPHONE ENCOUNTER
----- Message from Laura Reilly sent at 12/16/2019  7:40 AM EST -----  Regarding: Dr. Joenathan Meigs  Appointment not available    Caller's first and last name and relationship to patient (if not the patient): Dennie Gift wife      Best contact number: [phone]      Preferred date and time: Today      Scheduled appointment date and time: No appt scheduled      Reason for appointment: very bad back pain. Details to clarify the request: Pt was seen in [facility] on Friday night 12/13/19 for very bad back pain he started to feel better until Hal 12/15/19 when he did his morning stretch which cause his back to hurt extremely bad to the point where the medication prescribed by the Er is not working. Pt is in so much pain to the point where he is not getting any sleep and do one want to eat.        Laura Reilly

## 2019-12-16 NOTE — TELEPHONE ENCOUNTER
Spoke with Bright Cortez, patient's spouse. Per Bright Cortez, patient continues to have pain although taking medications from ER. Misty Tamez, per DO, patient to use ice to area for inflammation and pain. Patient also to continue taking motrin 600mg and can add 200mg every q 8 hours. Since patient not able to sleep, patient can add tylenol PM.  Scheduled patient for an appointment on 12/17/19 @ 8:45am with PCP.

## 2019-12-17 ENCOUNTER — OFFICE VISIT (OUTPATIENT)
Dept: INTERNAL MEDICINE CLINIC | Age: 64
End: 2019-12-17

## 2019-12-17 VITALS
DIASTOLIC BLOOD PRESSURE: 77 MMHG | TEMPERATURE: 98 F | RESPIRATION RATE: 16 BRPM | BODY MASS INDEX: 26.52 KG/M2 | OXYGEN SATURATION: 96 % | SYSTOLIC BLOOD PRESSURE: 131 MMHG | WEIGHT: 175 LBS | HEIGHT: 68 IN | HEART RATE: 67 BPM

## 2019-12-17 DIAGNOSIS — I50.22 SYSTOLIC CHF, CHRONIC (HCC): ICD-10-CM

## 2019-12-17 DIAGNOSIS — C18.7 MALIGNANT NEOPLASM OF SIGMOID COLON (HCC): ICD-10-CM

## 2019-12-17 DIAGNOSIS — E78.2 MIXED HYPERLIPIDEMIA: ICD-10-CM

## 2019-12-17 DIAGNOSIS — S39.012A STRAIN OF LUMBAR REGION, INITIAL ENCOUNTER: Primary | ICD-10-CM

## 2019-12-17 DIAGNOSIS — I48.0 PAROXYSMAL ATRIAL FIBRILLATION (HCC): ICD-10-CM

## 2019-12-17 RX ORDER — ASPIRIN 81 MG/1
81 TABLET ORAL DAILY
Qty: 90 TAB | Refills: 3
Start: 2019-12-17 | End: 2020-10-28

## 2019-12-17 RX ORDER — IBUPROFEN 200 MG
TABLET ORAL
COMMUNITY
End: 2020-10-28

## 2019-12-17 RX ORDER — IBUPROFEN 600 MG/1
600 TABLET ORAL EVERY 12 HOURS
Qty: 20 TAB | Refills: 0 | Status: SHIPPED | OUTPATIENT
Start: 2019-12-17 | End: 2019-12-24

## 2019-12-17 RX ORDER — DIAZEPAM 2 MG/1
2-4 TABLET ORAL
Qty: 30 TAB | Refills: 0 | Status: SHIPPED | OUTPATIENT
Start: 2019-12-17 | End: 2020-10-28
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
## 2019-12-17 NOTE — PROGRESS NOTES
Identified pt with two pt identifiers(name and ). Reviewed record in preparation for visit and have obtained necessary documentation. Chief Complaint   Patient presents with    Back Pain     Pt was bowling Dec. 3rd when he felt his back pull. Pt then started to aggrivate it the next week and went to the ER . Most pain is in lower back    Nausea     Pt has been nauseous and loss of appetite      Visit Vitals  /77 (BP 1 Location: Right arm, BP Patient Position: Sitting)   Pulse 67   Temp 98 °F (36.7 °C) (Oral)   Resp 16   Ht 5' 8\" (1.727 m)   Wt 175 lb (79.4 kg)   SpO2 96%   BMI 26.61 kg/m²       Pain Scale: 8/10  Pain Location: Back    Health Maintenance Due   Topic    DTaP/Tdap/Td series (1 - Tdap)    Shingrix Vaccine Age 49> (1 of 2)    Influenza Age 5 to Adult     Pneumococcal 0-64 years (1 of 1 - PPSV23)       Coordination of Care Questionnaire:  :   1) Have you been to an emergency room, urgent care, or hospitalized since your last visit? If yes, where when, and reason for visit? Yes, [facility], Back pain, 2019      2. Have seen or consulted any other health care provider since your last visit? If yes, where when, and reason for visit? NO      3) Do you have an Advanced Directive/ Living Will in place? YES  If yes, do we have a copy on file YES  If no, would you like information NO    Patient is accompanied by wife I have received verbal consent from Kaley Major to discuss any/all medical information while they are present in the room.

## 2019-12-19 RX ORDER — AMIODARONE HYDROCHLORIDE 200 MG/1
200 TABLET ORAL DAILY
Qty: 90 TAB | Refills: 3 | Status: SHIPPED | OUTPATIENT
Start: 2019-12-19 | End: 2020-12-18 | Stop reason: SDUPTHER

## 2020-05-13 RX ORDER — LISINOPRIL 2.5 MG/1
TABLET ORAL
Qty: 90 TAB | Refills: 0 | Status: SHIPPED | OUTPATIENT
Start: 2020-05-13 | End: 2020-08-19

## 2020-10-12 DIAGNOSIS — Z01.818 PREOP EXAMINATION: Primary | ICD-10-CM

## 2020-10-26 DIAGNOSIS — Z01.818 PREOP EXAMINATION: ICD-10-CM

## 2020-10-28 ENCOUNTER — OFFICE VISIT (OUTPATIENT)
Dept: URGENT CARE | Age: 65
End: 2020-10-28
Payer: COMMERCIAL

## 2020-10-28 VITALS — OXYGEN SATURATION: 95 % | TEMPERATURE: 98.4 F | HEART RATE: 87 BPM | RESPIRATION RATE: 15 BRPM

## 2020-10-28 DIAGNOSIS — Z11.59 SCREENING FOR VIRAL DISEASE: Primary | ICD-10-CM

## 2020-10-28 PROCEDURE — 99202 OFFICE O/P NEW SF 15 MIN: CPT | Performed by: NURSE PRACTITIONER

## 2020-10-28 NOTE — PROGRESS NOTES
Subjective: (As above and below)       This patient was seen in Flu Clinic at [address] [place] [address] Urgent Care while in their vehicle due to COVID-19 pandemic with PPE and focused examination in order to decrease community viral transmission. The patient/guardian gave verbal consent to treat. 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 Pt denies all symptoms and exposure. Jennifer Meyer is a 62 y.o. male who presents for COVID-19 testing  Required by physician  Currently has not tried any therapies and denies any symptoms at this point in time. Feels well otherwise. Recent travel: no  Known Exposure to COVID-19: no  Known flu or strep contact: no         ROS- negative for dizziness, cough, SOB, rhinorrhea, myalgias, n/v/d, rashes, headaches, fevers, chills, chest pains. Review of Systems - negative except as listed above    Reviewed PmHx, RxHx, FmHx, SocHx, AllgHx and updated in chart.   Family History   Problem Relation Age of Onset   Cheyanne Arun Cancer Mother     Coronary Artery Disease Father     Cancer Sister     Cancer Brother     Emphysema Brother        Past Medical History:   Diagnosis Date    [facility]rial [facility] ([facility]     Hypercholesterolemia     Hypertension       Social History     Socioeconomic History    Marital status:      Spouse name: Not on file    Number of children: Not on file    Years of education: Not on file    Highest education level: Not on file   Tobacco Use    Smoking status: Never Smoker    Smokeless tobacco: Never Used   Substance and Sexual Activity    Alcohol use: Yes     Frequency: Never     Comment: rarely    Drug use: Never    Sexual activity: Not Currently          Current Outpatient Medications   Medication Sig    metoprolol succinate (TOPROL-XL) 50 mg XL tablet TAKE 1 TABLET BY MOUTH ONCE DAILY TO  HELP  PREVENT  ATRIAL  FIB  *DECREASED  DOSE*    lisinopriL (PRINIVIL, ZESTRIL) 2.5 mg tablet Take 1 tablet by mouth once daily    rosuvastatin (CRESTOR) 20 mg tablet Take 1 tablet by mouth nightly    amiodarone (CORDARONE) 200 mg tablet Take 1 Tab by mouth daily. 1 tablet p.o. twice daily for 2 weeks, then decrease to 200 mg daily     No current facility-administered medications for this visit. Objective:     Vitals:    10/28/20 0826   Pulse: 87   Resp: 15   Temp: 98.4 °F (36.9 °C)   SpO2: 95%       Physical Exam  General appearance  appears well hydrated and does not appear toxic, no acute distress  Eyes - EOMs intact. Non injected. No scleral icterus   Ears - no external swelling  Nose - No purulent drainage  Neck/Lymphatics  trachea midline, full AROM  Chest - normal breathing effort. No active cough heard. No audible wheezing. Heart - HR-see vitals  Skin - no observable rashes or pallor  Neurologic- alert and oriented x 3  Psychiatric- normal mood, behavior and though content. Assessment/ Plan:     1. Screening for viral disease    - NOVEL CORONAVIRUS (COVID-19)        Follow up: We have reviewed, in detail, particular presentations/worsening/concerning signs and symptoms that may warrant seeking immediate care in the ED setting and patient verbalized being aware of what to watch for. For other non-severe changes, non-improvement or questions, patient aware to contact PCP office or consider a virtual online medical consultation. Reviewed with him that COVID-19 pandemic is an evolving situation with rapidly changing recommendations & guidelines. Medical decisions are made based on the the best information available at the time.   Recommended he stay tuned for updates published by trusted sources and to advise your PCP of any unexpected changes in clinical condition     Laura Gaffney NP

## 2020-10-30 LAB — SARS-COV-2, NAA: NOT DETECTED

## 2020-12-18 RX ORDER — AMIODARONE HYDROCHLORIDE 200 MG/1
200 TABLET ORAL DAILY
Qty: 90 TAB | Refills: 0 | Status: SHIPPED | OUTPATIENT
Start: 2020-12-18 | End: 2021-01-13

## 2021-01-13 ENCOUNTER — OFFICE VISIT (OUTPATIENT)
Dept: CARDIOLOGY CLINIC | Age: 66
End: 2021-01-13
Payer: COMMERCIAL

## 2021-01-13 VITALS
SYSTOLIC BLOOD PRESSURE: 110 MMHG | HEIGHT: 68 IN | DIASTOLIC BLOOD PRESSURE: 68 MMHG | OXYGEN SATURATION: 97 % | WEIGHT: 188.2 LBS | HEART RATE: 54 BPM | BODY MASS INDEX: 28.52 KG/M2 | RESPIRATION RATE: 16 BRPM

## 2021-01-13 DIAGNOSIS — R00.0 TACHYCARDIA-INDUCED CARDIOMYOPATHY (HCC): Primary | ICD-10-CM

## 2021-01-13 DIAGNOSIS — I10 ESSENTIAL HYPERTENSION: ICD-10-CM

## 2021-01-13 DIAGNOSIS — I48.0 PAROXYSMAL ATRIAL FIBRILLATION (HCC): ICD-10-CM

## 2021-01-13 DIAGNOSIS — E78.2 MIXED HYPERLIPIDEMIA: Chronic | ICD-10-CM

## 2021-01-13 DIAGNOSIS — I42.8 NICM (NONISCHEMIC CARDIOMYOPATHY) (HCC): ICD-10-CM

## 2021-01-13 DIAGNOSIS — I43 TACHYCARDIA-INDUCED CARDIOMYOPATHY (HCC): Primary | ICD-10-CM

## 2021-01-13 PROCEDURE — 93000 ELECTROCARDIOGRAM COMPLETE: CPT | Performed by: INTERNAL MEDICINE

## 2021-01-13 PROCEDURE — 99214 OFFICE O/P EST MOD 30 MIN: CPT | Performed by: INTERNAL MEDICINE

## 2021-01-13 RX ORDER — ASPIRIN 81 MG/1
81 TABLET ORAL DAILY
COMMUNITY
End: 2022-01-10 | Stop reason: ALTCHOICE

## 2021-01-13 NOTE — LETTER
1/13/2021 Patient: Marquita Mike YOB: 1955 Date of Visit: 1/13/2021 Professor Evangelista Padilla DO 
[address] [address] [address] [employer] Dear Professor Evangelista Padilla DO, Thank you for referring Mr. Anju Wiggins to [address] [place] [address] for evaluation. My notes for this consultation are attached. If you have questions, please do not hesitate to call me. I look forward to following your patient along with you.  
 
 
Sincerely, 
 
Mohsen Monk MD

## 2021-01-13 NOTE — PROGRESS NOTES
1. Have you been to the ER, urgent care clinic since your last visit? Hospitalized since your last visit? No.    2. Have you seen or consulted any other health care providers outside of the [address] [place] [address] since your last visit? Include any pap smears or colon screening.    No.          Chief Complaint   Patient presents with    Annual Exam     pt denies any cardiac symptoms

## 2021-01-13 NOTE — PROGRESS NOTES
[address] [place] [address]  [phone]     Subjective:      Severiano Zepeda is a 60 y.o. male is here for routine f/u. He has pmhx PAF NICM HTN and HLD. Last seen by us in 9/19. He remains in usual state of health. Denies any recurrent palpitation. He doesn't stay exercise; fairly sedentary for the most part. However, he lives hair  2 story [place]  And is able to go up and own the steps with no exertional symptoms. At the end of our appointment, he mentions oh by the way has not been taking the Eliquis. We discussed risks and benefits of taking and not taking Eliquis today. The patient denies chest pain/ shortness of breath, orthopnea, PND, LE edema, palpitations, syncope, or presyncope.        Patient Active Problem List    Diagnosis Date Noted    Mixed hyperlipidemia 08/30/2019    Nonischemic cardiomyopathy ([address] 35/74/4162    Systolic CHF, chronic ([address] 08/30/2019    Colon cancer ([address] 08/30/2019    Atrial fibrillation ([address] 06/03/2019      Radha Arevalo DO  Past Medical History:   Diagnosis Date    Atrial fibrillation ([address]     Hypercholesterolemia     Hypertension       Past Surgical History:   Procedure Laterality Date    HX APPENDECTOMY  2011    HX COLECTOMY  09/2013    cancer    HX HERNIA REPAIR       No Known Allergies   Family History   Problem Relation Age of Onset    Cancer Mother     Coronary Artery Disease Father     Cancer Sister     Cancer Brother     Emphysema Brother       Social History     Socioeconomic History    Marital status:      Spouse name: Not on file    Number of children: Not on file    Years of education: Not on file    Highest education level: Not on file   Occupational History    Not on file   Social Needs    Financial resource strain: Not on file    Food insecurity     Worry: Not on file     Inability: Not on file    Transportation needs     Medical: Not on file     Non-medical: Not on file Tobacco Use    Smoking status: Never Smoker    Smokeless tobacco: Never Used   Substance and Sexual Activity    Alcohol use: Yes     Frequency: Never     Comment: rarely    Drug use: Never    Sexual activity: Not Currently   Lifestyle    Physical activity     Days per week: Not on file     Minutes per session: Not on file    Stress: Not on file   Relationships    Social connections     Talks on phone: Not on file     Gets together: Not on file     Attends Rastafarian service: Not on file     Active member of club or organization: Not on file     Attends meetings of clubs or organizations: Not on file     Relationship status: Not on file    Intimate partner violence     Fear of current or ex partner: Not on file     Emotionally abused: Not on file     Physically abused: Not on file     Forced sexual activity: Not on file   Other Topics Concern    Not on file   Social History Narrative    Not on file      Current Outpatient Medications   Medication Sig    metoprolol succinate (TOPROL-XL) 50 mg XL tablet TAKE 1 TABLET BY MOUTH ONCE DAILY TO  HELP  PREVENT  ATRIAL  FIB  *DECREASED  DOSE* (Patient taking differently: Take 50 mg by mouth. 1/2 tab daily)    lisinopriL (PRINIVIL, ZESTRIL) 2.5 mg tablet Take 1 tablet by mouth once daily    rosuvastatin (CRESTOR) 20 mg tablet Take 1 tablet by mouth nightly     No current facility-administered medications for this visit. Review of Symptoms:  11 systems reviewed, negative other than as stated in the HPI    Physical ExamPhysical Exam:    Vitals:    01/13/21 1429   BP: 110/68   Pulse: (!) 54   Resp: 16   SpO2: 97%   Weight: 188 lb 3.2 oz (85.4 kg)   Height: 5' 8\" (1.727 m)     Body mass index is 28.62 kg/m². General PE  Gen:  NAD  Mental Status - Alert. General Appearance - Not in acute distress. HEENT:  PERRL, no carotid bruits or JVD  Chest and Lung Exam   Inspection: Accessory muscles - No use of accessory muscles in breathing.    Auscultation: Breath sounds: - Normal.   Cardiovascular   Inspection: Jugular vein - Bilateral - Inspection Normal.   Palpation/Percussion:   Apical Impulse: - Normal.   Auscultation: Rhythm - Regular. Heart Sounds - S1 WNL and S2 WNL. No S3 or S4. Murmurs & Other Heart Sounds: Auscultation of the heart reveals - No Murmurs. Peripheral Vascular   Upper Extremity: Inspection - Bilateral - No Cyanotic nailbeds or Digital clubbing. Lower Extremity:   Palpation: Edema - Bilateral - No edema. Abdomen:   Soft, non-tender, bowel sounds are active. Neuro: A&O times 3, CN and motor grossly WNL    Labs:   Lab Results   Component Value Date/Time    Cholesterol, total 300 (H) 05/23/2019 10:04 AM    HDL Cholesterol 51 05/23/2019 10:04 AM    LDL, calculated 220 (H) 05/23/2019 10:04 AM    Triglyceride 143 05/23/2019 10:04 AM     No results found for: CPK, CPKX, CPX  Lab Results   Component Value Date/Time    Sodium 138 12/14/2019 04:10 AM    Potassium 4.0 12/14/2019 04:10 AM    Chloride 107 12/14/2019 04:10 AM    CO2 25 12/14/2019 04:10 AM    Anion gap 6 12/14/2019 04:10 AM    Glucose 140 (H) 12/14/2019 04:10 AM    BUN 21 (H) 12/14/2019 04:10 AM    Creatinine 1.46 (H) 12/14/2019 04:10 AM    BUN/Creatinine ratio 14 12/14/2019 04:10 AM    GFR est AA 59 (L) 12/14/2019 04:10 AM    GFR est non-AA 49 (L) 12/14/2019 04:10 AM    Calcium 9.2 12/14/2019 04:10 AM    Bilirubin, total 0.8 12/14/2019 04:10 AM    Alk. phosphatase 79 12/14/2019 04:10 AM    Protein, total 7.9 12/14/2019 04:10 AM    Albumin 3.9 12/14/2019 04:10 AM    Globulin 4.0 12/14/2019 04:10 AM    A-G Ratio 1.0 (L) 12/14/2019 04:10 AM    ALT (SGPT) 37 12/14/2019 04:10 AM       EKG:  SB     Assessment:     Assessment:      1. Tachycardia-induced cardiomyopathy ([address]    2. Mixed hyperlipidemia    3. Paroxysmal atrial fibrillation (HCC)    4. Essential hypertension    5.  NICM (nonischemic cardiomyopathy) ([facility]        Orders Placed This Encounter    CK    LIPID PANEL    METABOLIC PANEL, COMPREHENSIVE    LOOP MONITOR, Clinic Performed     Standing Status:   Future     Standing Expiration Date:   7/13/2021     Scheduling Instructions:      2 week event     Order Specific Question:   Reason for Exam:     Answer:   hx pAF    AMB POC EKG ROUTINE W/ 12 LEADS, INTER & REP     Order Specific Question:   Reason for Exam:     Answer:   routine        Plan:       Paroxysmal atrial fibrillation  Remains in normal sinus rhythm, now with resolution of nonischemic cardiomyopathy per echo 9/19  Currently on Toprol XL 25 mg daily (dose decreased on 7/23/2019 due to bradycardia) for rate control and aspirin. He has declined Eliquis despite my recommending it. I explained that I think he has a 3% chance of stroke every year if he does not take Eliquis due to a blood clot. I explained that the risk of serious or catastrophic bleeding is much less than that on Eliquis. Despite that, he wishes to continue with aspirin 81 mg and no Eliquis. Advised him to let us know if he changes his mind. Already on Amiodarone at 100 mg daily which we will dc today. We will repeat a 2 week event monitor to  [employer] 2/1/2021 to reassess for any recurrent AF. Advised if I see recurrent atrial fibrillation I am going to try to push even harder for him to consider Eliquis. Recall: Converted spontaneously to normal sinus rhythm with amiodarone, prior to cardioversion.   Atrial Fibrillation CHADSVASC2 Score Stroke Risk:   60 y.o. <65        + 0    male Male     [de-identified]   CHF HX: Yes    +1   HTN HX: Yes    +1   Stroke/TIA/Thromboembolism No    +0   Vascular Disease HX: No    + 0   Diabetes Mellitus No    + 0   CHADSVASC 2 Score 3      Annual Stroke Risk 3.2% - moderate-high           Dilated cardiomyopathy ([address]  Echo 9/3/2019 shows LVEF 50 to 55%, moderately dilated left atrium  Echo done 5/2019 with reduced LVEF 15-20%, mildly dilated LA and mild-mod MR  Likely tachycardia-induced cardiomyopathy which is now resolved with restoration of normal sinus rhythm. Continue with Toprol and Lisinopril for medical management      Lexiscan stress test done 6/2019 without evidence of ischemia     Mixed hyperlipidemia   in 5/2019; Crestor 20 mg daily started. Repeat lipids-lab slip ordered on 9/9/2019 but never got it done, will reorder NOW  High likelihood of familial HLD given elevated LDL > 190    Counseled on diet and exercise- eventual goal of 30-60 minutes 5-7 times a week as per AHA guidelines. Continue current care and f/u in 1 year, sooner as needed.         Evelia Mueller MD

## 2021-01-16 LAB
ALBUMIN SERPL-MCNC: 4.5 G/DL (ref 3.8–4.8)
ALBUMIN/GLOB SERPL: 1.7 {RATIO} (ref 1.2–2.2)
ALP SERPL-CCNC: 76 IU/L (ref 39–117)
ALT SERPL-CCNC: 18 IU/L (ref 0–44)
AST SERPL-CCNC: 19 IU/L (ref 0–40)
BILIRUB SERPL-MCNC: 0.5 MG/DL (ref 0–1.2)
BUN SERPL-MCNC: 22 MG/DL (ref 8–27)
BUN/CREAT SERPL: 16 (ref 10–24)
CALCIUM SERPL-MCNC: 9.6 MG/DL (ref 8.6–10.2)
CHLORIDE SERPL-SCNC: 106 MMOL/L (ref 96–106)
CHOLEST SERPL-MCNC: 203 MG/DL (ref 100–199)
CK SERPL-CCNC: 120 U/L (ref 41–331)
CO2 SERPL-SCNC: 24 MMOL/L (ref 20–29)
CREAT SERPL-MCNC: 1.4 MG/DL (ref 0.76–1.27)
GLOBULIN SER CALC-MCNC: 2.6 G/DL (ref 1.5–4.5)
GLUCOSE SERPL-MCNC: 108 MG/DL (ref 65–99)
HDLC SERPL-MCNC: 57 MG/DL
INTERPRETATION, 910389: NORMAL
INTERPRETATION: NORMAL
LDLC SERPL CALC-MCNC: 125 MG/DL (ref 0–99)
PDF IMAGE, 910387: NORMAL
POTASSIUM SERPL-SCNC: 5.1 MMOL/L (ref 3.5–5.2)
PROT SERPL-MCNC: 7.1 G/DL (ref 6–8.5)
SODIUM SERPL-SCNC: 142 MMOL/L (ref 134–144)
TRIGL SERPL-MCNC: 116 MG/DL (ref 0–149)
VLDLC SERPL CALC-MCNC: 21 MG/DL (ref 5–40)

## 2021-01-17 NOTE — PROGRESS NOTES
LDL has greatly improved with statin---down to 125 from 220 so this is is good. Goal for LDL is below 100. So he is close. We can increase his rosuvastatin to 40 mg daily and repeat labs in 3 mos. Or he can work on adhering to low fat low cholesterol  Diet and incorporate at least 30 minutes of CV exercises 4-5 times a week. His kidney fxn is a little elevated, stable from 2019 labs. Avoid NSAID and stay hydrated at least 8 glasses of water daily. His fasting blood glucose is high, he is not diabetic. Can discuss with pcp re getting A1C if indicated.

## 2021-01-18 ENCOUNTER — TELEPHONE (OUTPATIENT)
Dept: CARDIOLOGY CLINIC | Age: 66
End: 2021-01-18

## 2021-01-18 NOTE — TELEPHONE ENCOUNTER
Patient informed will try to exercise and improve dietary habits would prefer to not increase statin not sure when will see PCP.

## 2021-01-18 NOTE — TELEPHONE ENCOUNTER
----- Message from Martinez Cazares NP sent at 1/17/2021  9:03 AM EST -----  LDL has greatly improved with statin---down to 125 from 220 so this is is good. Goal for LDL is below 100. So he is close. We can increase his rosuvastatin to 40 mg daily and repeat labs in 3 mos. Or he can work on adhering to low fat low cholesterol  Diet and incorporate at least 30 minutes of CV exercises 4-5 times a week. His kidney fxn is a little elevated, stable from 2019 labs. Avoid NSAID and stay hydrated at least 8 glasses of water daily. His fasting blood glucose is high, he is not diabetic. Can discuss with pcp re getting A1C if indicated.

## 2021-01-27 ENCOUNTER — CLINICAL SUPPORT (OUTPATIENT)
Dept: CARDIOLOGY CLINIC | Age: 66
End: 2021-01-27

## 2021-01-27 DIAGNOSIS — I48.91 ATRIAL FIBRILLATION, UNSPECIFIED TYPE (HCC): Primary | ICD-10-CM

## 2021-01-27 NOTE — PROGRESS NOTES
Patient received a 2 week event monitor. Instructions given verbally as well as an instruction sheet. Pt verbalized understanding.     [facility] Event Monitoring

## 2021-02-01 DIAGNOSIS — I10 ESSENTIAL HYPERTENSION: ICD-10-CM

## 2021-02-01 DIAGNOSIS — I48.0 PAROXYSMAL ATRIAL FIBRILLATION (HCC): ICD-10-CM

## 2021-02-01 DIAGNOSIS — I43 TACHYCARDIA-INDUCED CARDIOMYOPATHY (HCC): ICD-10-CM

## 2021-02-01 DIAGNOSIS — I42.8 NICM (NONISCHEMIC CARDIOMYOPATHY) (HCC): ICD-10-CM

## 2021-02-01 DIAGNOSIS — E78.2 MIXED HYPERLIPIDEMIA: Chronic | ICD-10-CM

## 2021-02-01 DIAGNOSIS — R00.0 TACHYCARDIA-INDUCED CARDIOMYOPATHY (HCC): ICD-10-CM

## 2021-02-24 ENCOUNTER — TELEPHONE (OUTPATIENT)
Dept: CARDIOLOGY CLINIC | Age: 66
End: 2021-02-24

## 2021-02-24 NOTE — TELEPHONE ENCOUNTER
----- Message from Sherry Travis MD sent at 2/24/2021  8:30 AM EST -----  Please advise no evidence of atrial fibrillation. Please tell him to let us know if he has any palpitations that suggest atrial fibrillation at which point I think he needs to strongly consider anticoagulation again. Otherwise, follow-up as scheduled.

## 2021-02-24 NOTE — PROGRESS NOTES
"Subjective:       Patient ID: William Carter is a 20 y.o. male.    Chief Complaint: Panic Attack (pt had a severe panic attacks (anxiety) along with blackening out & shallow breathing (especially while lying down at night. pt currently takes Clonazepam & Effexor.)    HPI   19 yo M here for urgent eval of anxiety with panic attacks/possible seizure like activity.   Recent egd with normal pathology, erythematous mucosa.     Was sitting in car parked doing homework between classes and friend found him shaking and nonresponsive. He doesn't recall anything after texting friend before event. Friend described as vibrating.  Started about a week ago. Has had multiple episodes. EMS has come to evaluate.  eh was seen in ED on 8/31. His apple watch recorded  during an attack. HR also goes down to 50 on apple watch and feels tired at this time.   He exercises in marching band.    Started around same time as effexor xr being started and under more stress.     Gets headache and lightheadedness prior and after event.   Review of Systems   Constitutional: Negative for fever.   Respiratory: Negative for shortness of breath.    Cardiovascular: Negative for chest pain.   Musculoskeletal: Negative.    Skin: Negative.    Psychiatric/Behavioral: The patient is nervous/anxious.        Objective:   /76 (BP Location: Right arm, Patient Position: Sitting, BP Method: Large (Manual))   Pulse 75   Ht 5' 10" (1.778 m)   Wt 80.7 kg (178 lb)   SpO2 96%   BMI 25.54 kg/m²      Physical Exam   Constitutional: He is oriented to person, place, and time. He appears well-developed and well-nourished. No distress.   HENT:   Head: Normocephalic and atraumatic.   Cardiovascular: Normal rate and regular rhythm.   No murmur heard.  Pulmonary/Chest: Effort normal. No respiratory distress. He has no wheezes. He has no rales.   Neurological: He is alert and oriented to person, place, and time.   EOMI, perrl, facial expressions symmetric, facial " Please advise no evidence of atrial fibrillation. Please tell him to let us know if he has any palpitations that suggest atrial fibrillation at which point I think he needs to strongly consider anticoagulation again. Otherwise, follow-up as scheduled. sensation intact bilaterally, Upper and lower extremity strength 5/5, patellar reflexes 2+ bilateral, gait normal, normal finger to nose pointing     Skin: Skin is warm and dry. He is not diaphoretic.   Psychiatric: He has a normal mood and affect. His behavior is normal.       Assessment:       1. Anxiety    2. Seizure-like activity        Plan:       William MORENO was seen today for panic attack.    Diagnoses and all orders for this visit:    Anxiety  Seizure-like activity - suspect pseudoseizures related to increased anxiety and stress. Need to rule out organic cause especially in setting of associated headaches and family history of seizures  -     CT Head Without Contrast; Future  -     Ambulatory consult to Neurology   Will follow up with psychiatrist this week   Advised to abstain for driving for time being due to safety risk of seizures or pseudoseizure while driving

## 2021-02-25 NOTE — TELEPHONE ENCOUNTER
Verified patient with two identifiers. PT informed of monitor results. He will keep us posted on any change in symptoms. Pt verbalized understanding.

## 2021-10-05 ENCOUNTER — OFFICE VISIT (OUTPATIENT)
Dept: INTERNAL MEDICINE CLINIC | Age: 66
End: 2021-10-05
Payer: COMMERCIAL

## 2021-10-05 VITALS
DIASTOLIC BLOOD PRESSURE: 66 MMHG | WEIGHT: 175.6 LBS | HEIGHT: 68 IN | RESPIRATION RATE: 14 BRPM | BODY MASS INDEX: 26.61 KG/M2 | TEMPERATURE: 97.3 F | HEART RATE: 61 BPM | OXYGEN SATURATION: 96 % | SYSTOLIC BLOOD PRESSURE: 114 MMHG

## 2021-10-05 DIAGNOSIS — S39.012A LUMBAR STRAIN, INITIAL ENCOUNTER: ICD-10-CM

## 2021-10-05 DIAGNOSIS — Z23 ENCOUNTER FOR IMMUNIZATION: ICD-10-CM

## 2021-10-05 DIAGNOSIS — N18.30 STAGE 3 CHRONIC KIDNEY DISEASE, UNSPECIFIED WHETHER STAGE 3A OR 3B CKD (HCC): ICD-10-CM

## 2021-10-05 DIAGNOSIS — N63.20 LEFT BREAST LUMP: ICD-10-CM

## 2021-10-05 DIAGNOSIS — L98.9 SKIN LESION OF FACE: ICD-10-CM

## 2021-10-05 DIAGNOSIS — R73.03 PREDIABETES: ICD-10-CM

## 2021-10-05 DIAGNOSIS — I48.0 PAROXYSMAL ATRIAL FIBRILLATION (HCC): ICD-10-CM

## 2021-10-05 DIAGNOSIS — E78.2 MIXED HYPERLIPIDEMIA: ICD-10-CM

## 2021-10-05 DIAGNOSIS — N40.0 BENIGN PROSTATIC HYPERPLASIA, UNSPECIFIED WHETHER LOWER URINARY TRACT SYMPTOMS PRESENT: ICD-10-CM

## 2021-10-05 DIAGNOSIS — I50.22 SYSTOLIC CHF, CHRONIC (HCC): ICD-10-CM

## 2021-10-05 DIAGNOSIS — I42.8 NONISCHEMIC CARDIOMYOPATHY (HCC): ICD-10-CM

## 2021-10-05 DIAGNOSIS — Z00.00 ANNUAL PHYSICAL EXAM: Primary | ICD-10-CM

## 2021-10-05 DIAGNOSIS — C18.7 MALIGNANT NEOPLASM OF SIGMOID COLON (HCC): ICD-10-CM

## 2021-10-05 LAB
ALBUMIN SERPL-MCNC: 3.7 G/DL (ref 3.5–5)
ALBUMIN/GLOB SERPL: 1 {RATIO} (ref 1.1–2.2)
ALP SERPL-CCNC: 80 U/L (ref 45–117)
ALT SERPL-CCNC: 18 U/L (ref 12–78)
ANION GAP SERPL CALC-SCNC: 5 MMOL/L (ref 5–15)
AST SERPL-CCNC: 14 U/L (ref 15–37)
BASOPHILS # BLD: 0.1 K/UL (ref 0–0.1)
BASOPHILS NFR BLD: 1 % (ref 0–1)
BILIRUB SERPL-MCNC: 0.8 MG/DL (ref 0.2–1)
BUN SERPL-MCNC: 19 MG/DL (ref 6–20)
BUN/CREAT SERPL: 16 (ref 12–20)
CALCIUM SERPL-MCNC: 9.5 MG/DL (ref 8.5–10.1)
CHLORIDE SERPL-SCNC: 109 MMOL/L (ref 97–108)
CHOLEST SERPL-MCNC: 326 MG/DL
CO2 SERPL-SCNC: 26 MMOL/L (ref 21–32)
CREAT SERPL-MCNC: 1.22 MG/DL (ref 0.7–1.3)
DIFFERENTIAL METHOD BLD: NORMAL
EOSINOPHIL # BLD: 0.2 K/UL (ref 0–0.4)
EOSINOPHIL NFR BLD: 3 % (ref 0–7)
ERYTHROCYTE [DISTWIDTH] IN BLOOD BY AUTOMATED COUNT: 12.1 % (ref 11.5–14.5)
EST. AVERAGE GLUCOSE BLD GHB EST-MCNC: 117 MG/DL
GLOBULIN SER CALC-MCNC: 3.8 G/DL (ref 2–4)
GLUCOSE SERPL-MCNC: 106 MG/DL (ref 65–100)
HBA1C MFR BLD: 5.7 % (ref 4–5.6)
HCT VFR BLD AUTO: 44 % (ref 36.6–50.3)
HDLC SERPL-MCNC: 48 MG/DL
HDLC SERPL: 6.8 {RATIO} (ref 0–5)
HGB BLD-MCNC: 13.9 G/DL (ref 12.1–17)
IMM GRANULOCYTES # BLD AUTO: 0 K/UL (ref 0–0.04)
IMM GRANULOCYTES NFR BLD AUTO: 0 % (ref 0–0.5)
LDLC SERPL CALC-MCNC: 238.6 MG/DL (ref 0–100)
LYMPHOCYTES # BLD: 2.3 K/UL (ref 0.8–3.5)
LYMPHOCYTES NFR BLD: 29 % (ref 12–49)
MCH RBC QN AUTO: 29.6 PG (ref 26–34)
MCHC RBC AUTO-ENTMCNC: 31.6 G/DL (ref 30–36.5)
MCV RBC AUTO: 93.6 FL (ref 80–99)
MONOCYTES # BLD: 0.8 K/UL (ref 0–1)
MONOCYTES NFR BLD: 10 % (ref 5–13)
NEUTS SEG # BLD: 4.6 K/UL (ref 1.8–8)
NEUTS SEG NFR BLD: 58 % (ref 32–75)
NRBC # BLD: 0 K/UL (ref 0–0.01)
NRBC BLD-RTO: 0 PER 100 WBC
PLATELET # BLD AUTO: 179 K/UL (ref 150–400)
POTASSIUM SERPL-SCNC: 4.7 MMOL/L (ref 3.5–5.1)
PROT SERPL-MCNC: 7.5 G/DL (ref 6.4–8.2)
PSA SERPL-MCNC: 1.6 NG/ML (ref 0.01–4)
RBC # BLD AUTO: 4.7 M/UL (ref 4.1–5.7)
SODIUM SERPL-SCNC: 140 MMOL/L (ref 136–145)
TRIGL SERPL-MCNC: 197 MG/DL (ref ?–150)
TSH SERPL DL<=0.05 MIU/L-ACNC: 0.06 UIU/ML (ref 0.36–3.74)
VLDLC SERPL CALC-MCNC: 39.4 MG/DL
WBC # BLD AUTO: 7.9 K/UL (ref 4.1–11.1)

## 2021-10-05 PROCEDURE — 90471 IMMUNIZATION ADMIN: CPT | Performed by: INTERNAL MEDICINE

## 2021-10-05 PROCEDURE — 99396 PREV VISIT EST AGE 40-64: CPT | Performed by: INTERNAL MEDICINE

## 2021-10-05 PROCEDURE — 90750 HZV VACC RECOMBINANT IM: CPT | Performed by: INTERNAL MEDICINE

## 2021-10-05 NOTE — PATIENT INSTRUCTIONS
Back Strain: Care Instructions  Overview     A back strain happens when you overstretch, or pull, a muscle in your back. You may hurt your back in an accident or when you exercise or lift something. Sometimes you may not know how you hurt your back. Most back pain will get better with rest and time. You can take care of yourself at home to help your back heal.  Follow-up care is a key part of your treatment and safety. Be sure to make and go to all appointments, and call your doctor if you are having problems. It's also a good idea to know your test results and keep a list of the medicines you take. How can you care for yourself at home? · Try to stay as active as you can, but stop or reduce any activity that causes pain. · Put ice or a cold pack on the sore muscle for 10 to 20 minutes at a time to stop swelling. Try this twice daily for next 5 days. · Take pain medicines exactly as directed. ? If the doctor gave you a prescription medicine for pain, take it as prescribed. ? If you are not taking a prescription pain medicine, ask your doctor if you can take an over-the-counter medicine. · Try sleeping on your side with a pillow between your legs. Or put a pillow under your knees when you lie on your back. These measures can ease pain in your lower back. · Return to your usual level of activity slowly. When should you call for help? Call 911 anytime you think you may need emergency care. For example, call if:    · You are unable to move a leg at all. Call your doctor now or seek immediate medical care if:    · You have new or worse symptoms in your legs, belly, or buttocks. Symptoms may include:  ? Numbness or tingling. ? Weakness. ? Pain.     · You lose bladder or bowel control. Watch closely for changes in your health, and be sure to contact your doctor if:    · You have a fever, lose weight, or don't feel well.     · You are not getting better as expected. Where can you learn more?   Go to http://www.[employer].com/  Enter [id number] in the search box to learn more about \"Back Strain: Care Instructions. \"  Current as of: July 1, 2021               Content Version: 13.0  © 1650-5167 Healthwise, [facility]. Care instructions adapted under license by [employer] (which disclaims liability or warranty for this information). If you have questions about a medical condition or this instruction, always ask your healthcare professional. [address] any warranty or liability for your use of this information. Use ice to low back for 15-20 minutes twice daily for next 5 days. Take nsaids (advil, ibuprofen, aleve) twice daily for next 5 days as well. Do more stretching for low back and legs. 5-10 minutes daily.

## 2021-10-05 NOTE — PROGRESS NOTES
Kamala Edwards is a 60 y.o. male who presents for evaluation of annual cpe. Last seen by me dec 17, 2019 for low back strain--got better after a few weeks, but continues to flare up at times. Ran out of crestor, stopped his asa. Felt lump in left breast a few weeks ago, but it has since resolved. No family hx of breast cancer. No pain, no nipple discharge. Also has a spot on his left cheek that has bothered him for months. Wife  On phone.       ROS:  Constitutional: negative for fevers, chills, anorexia and weight loss  Eyes:   negative for visual disturbance and irritation  ENT:   negative for tinnitus,sore throat,nasal congestion,ear pain,hoarseness  Respiratory:  negative for cough, hemoptysis, dyspnea,wheezing  CV:   negative for chest pain, palpitations, lower extremity edema  GI:   negative for nausea, vomiting, diarrhea, abdominal pain,melena  Genitourinary: negative for frequency, dysuria and hematuria  Musculoskel: negative for myalgias, arthralgias, back pain, muscle weakness, joint pain  Neurological:  negative for headaches, dizziness, focal weakness, numbness  Psychiatric:     Negative for depression or anxiety      Past Medical History:   Diagnosis Date    Atrial fibrillation ([facility] [address]     Hypercholesterolemia     Hypertension        Past Surgical History:   Procedure Laterality Date    HX APPENDECTOMY  2011    HX HERNIA REPAIR      HX TOTAL COLECTOMY  09/2013    cancer       Family History   Problem Relation Age of Onset    Cancer Mother     Coronary Artery Disease Father     Cancer Sister     Cancer Brother     Emphysema Brother        Social History     Socioeconomic History    Marital status:      Spouse name: Not on file    Number of children: Not on file    Years of education: Not on file    Highest education level: Not on file   Occupational History    Not on file   Tobacco Use    Smoking status: Never Smoker    Smokeless tobacco: Never Used   Substance and Sexual Activity    Alcohol use: Yes     Comment: rarely    Drug use: Never    Sexual activity: Not Currently   Other Topics Concern    Not on file   Social History Narrative    Not on file     Social Determinants of Health     Financial Resource Strain:     Difficulty of Paying Living Expenses:    Food Insecurity:     Worried About Running Out of Food in the Last Year:     [address] in the Last Year:    Transportation Needs:     Lack of Transportation (Medical):  Lack of Transportation (Non-Medical):    Physical Activity:     Days of Exercise per Week:     Minutes of Exercise per Session:    Stress:     Feeling of Stress :    Social Connections:     Frequency of Communication with Friends and Family:     Frequency of Social Gatherings with Friends and Family:     Attends [employer] Services:     Active Member of Clubs or Organizations:     Attends Club or Organization Meetings:     Marital Status:    Intimate Partner Violence:     Fear of Current or Ex-Partner:     Emotionally Abused:     Physically Abused:     Sexually Abused:             Visit Vitals  /66 (BP 1 Location: Left upper arm, BP Patient Position: Sitting)   Pulse 61   Temp 97.3 °F (36.3 °C) (Temporal)   Resp 14   Ht 5' 8\" (1.727 m)   Wt 175 lb 9.6 oz (79.7 kg)   SpO2 96%   BMI 26.70 kg/m²       Physical Examination:   General - Well appearing male  HEENT - PERRL, TM no erythema/opacification, normal nasal turbinates, no oropharyngeal erythema or exudate, MMM  Neck - supple, no bruits, no thyroidomegaly, no lymphadenopathy  Pulm - clear to auscultation bilaterally  Cardio - RRR, normal S1 S2, no murmur  Abd - soft, nontender, no masses, no HSM  Extrem - no edema, +2 distal pulses  Neuro-  No focal deficits, CN intact     Assessment/Plan:    1. Annual cpe--check cbc, cmp, flp, tsh, a1c, psa  2.  hyperlipids--check flp, no longer taking crestor, will need refill  3. pafib--nsr now.   On toprol xl  4.  htn--controlled with toprol xl, lisinopril  5. Nonischemic CM, with chronic systolic chf--well compensated, on bb, acei. Last echo in 2019 had EF up to 50-55%  6.  ckd 3--check cmp  7. Skin lesion--referral to derm, dr daley  8. Left breast lump--check mammogram.  Exam benign today    1st shingrix given today. rtc one year, sooner if labs abn.         Genette Hang III, DO

## 2021-10-06 LAB — T4 FREE SERPL-MCNC: 1.3 NG/DL (ref 0.8–1.5)

## 2021-10-07 RX ORDER — ROSUVASTATIN CALCIUM 20 MG/1
20 TABLET, COATED ORAL
Qty: 90 TABLET | Refills: 3 | Status: SHIPPED | OUTPATIENT
Start: 2021-10-07 | End: 2022-09-28

## 2021-10-07 NOTE — PROGRESS NOTES
Letter mailed to patient. Called, spoke to pt. Two identifiers confirmed. Pt notified of results/recommendations per Dr. Ladonna Lopez. Pt verbalized understanding of information discussed w/ no further questions at this time. Rx sent in to [facility]. Karishma Loud labs look ok. [employer] function better.

## 2021-10-26 ENCOUNTER — TELEPHONE (OUTPATIENT)
Dept: CARDIOLOGY CLINIC | Age: 66
End: 2021-10-26

## 2021-10-26 NOTE — TELEPHONE ENCOUNTER
Pt is having some systems of heart racing,if pt drinks iced drinks it can bring it on. Please advise. Urbano\e call him at [phone].       Thanks Lauren

## 2021-10-26 NOTE — TELEPHONE ENCOUNTER
Patient C/O over past few weeks noting increase heart beats especially after drinking  or eating something cold. His BP pre ice cream 145/86 pulse 60 and post ice cream 96/65 pulse 135 lasting hours and feeling weak. Confirmed Toprol 25 mg daily . He takes ASA occassionally.  Please advise thanks

## 2021-10-26 NOTE — TELEPHONE ENCOUNTER
Patient doesn't want to wear monitor he does want to see Dr Bethany Gonzalez sooner than Dec . Taryn Neville please call to schedule thanks

## 2021-11-12 ENCOUNTER — CLINICAL SUPPORT (OUTPATIENT)
Dept: CARDIOLOGY CLINIC | Age: 66
End: 2021-11-12

## 2021-11-12 ENCOUNTER — OFFICE VISIT (OUTPATIENT)
Dept: CARDIOLOGY CLINIC | Age: 66
End: 2021-11-12
Payer: COMMERCIAL

## 2021-11-12 VITALS
HEIGHT: 68 IN | OXYGEN SATURATION: 99 % | BODY MASS INDEX: 26.45 KG/M2 | SYSTOLIC BLOOD PRESSURE: 122 MMHG | DIASTOLIC BLOOD PRESSURE: 60 MMHG | HEART RATE: 62 BPM | RESPIRATION RATE: 18 BRPM | WEIGHT: 174.5 LBS

## 2021-11-12 DIAGNOSIS — I42.0 DILATED CARDIOMYOPATHY (HCC): ICD-10-CM

## 2021-11-12 DIAGNOSIS — I48.0 PAROXYSMAL ATRIAL FIBRILLATION (HCC): Primary | ICD-10-CM

## 2021-11-12 DIAGNOSIS — I10 ESSENTIAL HYPERTENSION: ICD-10-CM

## 2021-11-12 DIAGNOSIS — I42.8 NONISCHEMIC CARDIOMYOPATHY (HCC): ICD-10-CM

## 2021-11-12 DIAGNOSIS — R00.2 HEART PALPITATIONS: Primary | ICD-10-CM

## 2021-11-12 DIAGNOSIS — E78.2 MIXED HYPERLIPIDEMIA: ICD-10-CM

## 2021-11-12 DIAGNOSIS — R00.2 HEART PALPITATIONS: ICD-10-CM

## 2021-11-12 DIAGNOSIS — E78.49 FAMILIAL HYPERLIPIDEMIA: ICD-10-CM

## 2021-11-12 PROCEDURE — 93270 REMOTE 30 DAY ECG REV/REPORT: CPT | Performed by: INTERNAL MEDICINE

## 2021-11-12 PROCEDURE — 93272 ECG/REVIEW INTERPRET ONLY: CPT | Performed by: INTERNAL MEDICINE

## 2021-11-12 PROCEDURE — 93000 ELECTROCARDIOGRAM COMPLETE: CPT | Performed by: INTERNAL MEDICINE

## 2021-11-12 PROCEDURE — 99214 OFFICE O/P EST MOD 30 MIN: CPT | Performed by: INTERNAL MEDICINE

## 2021-11-12 NOTE — LETTER
11/12/2021    Patient: Stephy Valenzuela   YOB: 1955   Date of Visit: 11/12/2021     Jamaal Hernandez IIIDO  [address] [facility] [address]  [facility]  [place]    Dear Davian Yi DO,      Thank you for referring Mr. Francesca Bishop to [facility] for evaluation. My notes for this consultation are attached. If you have questions, please do not hesitate to call me. I look forward to following your patient along with you.       Sincerely,    Sherry Travis MD

## 2021-11-12 NOTE — PROGRESS NOTES
Patient received a 30 day event monitor. Instructions given verbally as well as an instruction sheet. Pt verbalized understanding.     [facility] Event Monitoring

## 2021-11-12 NOTE — PROGRESS NOTES
[address] [place] [address]  [phone]     Subjective:      Gina Koch is a 60 y.o. male is here for routine f/u.  for routine f/u. He has pmhx PAF NICM HTN and HLD. Last seen by us in 1/2021. At that visit, we dc amio and we obtained 2 week event monitor which did not show recurrent AF. Today he reports episode of fast HR --- after drinking ice cold drinks or after eating. He states he felt rapid heartbeat and fatigue for about a week after he went to this fast heartbeat, and then it resolved spontaneously. Today he is in sinus rhythm. He admits he ran out of his cholesterol medicine for about a month prior to this. His recent lipid panel reflects being off of Crestor for a month. The patient denies chest pain/ shortness of breath, orthopnea, PND, LE edema, palpitations, syncope, or presyncope. 2 week event 2/2021    Arrhthymias:   Occasional PVCs with a burden of 2%, and rare PACs with a burden of 1%.  No atrial fibrillation noted.      Patient Active Problem List    Diagnosis Date Noted    Mixed hyperlipidemia 08/30/2019    Nonischemic cardiomyopathy ([address] 45/41/5040    Systolic CHF, chronic ([address] 08/30/2019    Colon cancer ([address] 08/30/2019    Atrial fibrillation ([address] 06/03/2019      Anna Marie Poon DO  Past Medical History:   Diagnosis Date    Atrial fibrillation ([address]     Cancer ([address]     Congestive heart failure ([address]     Hypercholesterolemia     Hypertension       Past Surgical History:   Procedure Laterality Date    HX APPENDECTOMY  2011    HX HERNIA REPAIR      HX TOTAL COLECTOMY  09/2013    cancer     No Known Allergies   Family History   Problem Relation Age of Onset    Cancer Mother     Coronary Art Dis Father     Cancer Sister     Cancer Brother     Emphysema Brother       Social History     Socioeconomic History    Marital status:      Spouse name: Not on file    Number of children: Not on file    Years of education: Not on file    Highest education level: Not on file   Occupational History    Not on file   Tobacco Use    Smoking status: Never Smoker    Smokeless tobacco: Never Used   Substance and Sexual Activity    Alcohol use: Not Currently     Comment: rarely    Drug use: Never    Sexual activity: Not Currently   Other Topics Concern    Not on file   Social History Narrative    Not on file     Social Determinants of Health     Financial Resource Strain:     Difficulty of Paying Living Expenses: Not on file   Food Insecurity:     Worried About Running Out of Food in the Last Year: Not on file    Evelina of Food in the Last Year: Not on file   Transportation Needs:     Lack of Transportation (Medical): Not on file    Lack of Transportation (Non-Medical): Not on file   Physical Activity:     Days of Exercise per Week: Not on file    Minutes of Exercise per Session: Not on file   Stress:     Feeling of Stress : Not on file   Social Connections:     Frequency of Communication with Friends and Family: Not on file    Frequency of Social Gatherings with Friends and Family: Not on file    Attends [employer] Services: Not on file    Active Member of [address] [place] [address] or Organizations: Not on file    Attends Club or Organization Meetings: Not on file    Marital Status: Not on file   Intimate Partner Violence:     Fear of Current or Ex-Partner: Not on file    Emotionally Abused: Not on file    Physically Abused: Not on file    Sexually Abused: Not on file   Housing Stability:     Unable to Pay for Housing in the Last Year: Not on file    Number of [place] in the Last Year: Not on file    Unstable Housing in the Last Year: Not on file      Current Outpatient Medications   Medication Sig    metoprolol succinate (TOPROL-XL) 25 mg XL tablet Take 1 tablet by mouth once daily    rosuvastatin (CRESTOR) 20 mg tablet Take 1 Tablet by mouth nightly.     lisinopriL (PRINIVIL, ZESTRIL) 2.5 mg tablet Take 1 tablet by mouth once daily    aspirin delayed-release 81 mg tablet Take 81 mg by mouth daily. No current facility-administered medications for this visit. Review of Symptoms:  11 systems reviewed, negative other than as stated in the HPI    Physical ExamPhysical Exam:    Vitals:    11/12/21 1022   BP: 122/60   Pulse: 62   Resp: 18   SpO2: 99%   Weight: 174 lb 8 oz (79.2 kg)   Height: 5' 8\" (1.727 m)     Body mass index is 26.53 kg/m². General PE  Gen:  NAD  Mental Status - Alert. General Appearance - Not in acute distress. HEENT:  PERRL, no carotid bruits or JVD  Chest and Lung Exam   Inspection: Accessory muscles - No use of accessory muscles in breathing. Auscultation:   Breath sounds: - Normal.   Cardiovascular   Inspection: Jugular vein - Bilateral - Inspection Normal.   Palpation/Percussion:   Apical Impulse: - Normal.   Auscultation: Rhythm - Regular. Heart Sounds - S1 WNL and S2 WNL. No S3 or S4. Murmurs & Other Heart Sounds: Auscultation of the heart reveals - No Murmurs. Peripheral Vascular   Upper Extremity: Inspection - Bilateral - No Cyanotic nailbeds or Digital clubbing. Lower Extremity:   Palpation: Edema - Bilateral - No edema. Abdomen:   Soft, non-tender, bowel sounds are active.   Neuro: A&O times 3, CN and motor grossly WNL    Labs:   Lab Results   Component Value Date/Time    Cholesterol, total 326 (H) 10/05/2021 09:35 AM    Cholesterol, total 203 (H) 01/15/2021 09:10 AM    Cholesterol, total 300 (H) 05/23/2019 10:04 AM    HDL Cholesterol 48 10/05/2021 09:35 AM    HDL Cholesterol 57 01/15/2021 09:10 AM    HDL Cholesterol 51 05/23/2019 10:04 AM    LDL, calculated 238.6 (H) 10/05/2021 09:35 AM    LDL, calculated 125 (H) 01/15/2021 09:10 AM    LDL, calculated 220 (H) 05/23/2019 10:04 AM    Triglyceride 197 (H) 10/05/2021 09:35 AM    Triglyceride 116 01/15/2021 09:10 AM    Triglyceride 143 05/23/2019 10:04 AM    CHOL/HDL Ratio 6.8 (H) 10/05/2021 09:35 AM     No results found for: CPK, CPKX, CPX  Lab Results   Component Value Date/Time    Sodium 140 10/05/2021 09:35 AM    Potassium 4.7 10/05/2021 09:35 AM    Chloride 109 (H) 10/05/2021 09:35 AM    CO2 26 10/05/2021 09:35 AM    Anion gap 5 10/05/2021 09:35 AM    Glucose 106 (H) 10/05/2021 09:35 AM    BUN 19 10/05/2021 09:35 AM    Creatinine 1.22 10/05/2021 09:35 AM    BUN/Creatinine ratio 16 10/05/2021 09:35 AM    GFR est AA >60 10/05/2021 09:35 AM    GFR est non-AA 60 (L) 10/05/2021 09:35 AM    Calcium 9.5 10/05/2021 09:35 AM    Bilirubin, total 0.8 10/05/2021 09:35 AM    Alk. phosphatase 80 10/05/2021 09:35 AM    Protein, total 7.5 10/05/2021 09:35 AM    Albumin 3.7 10/05/2021 09:35 AM    Globulin 3.8 10/05/2021 09:35 AM    A-G Ratio 1.0 (L) 10/05/2021 09:35 AM    ALT (SGPT) 18 10/05/2021 09:35 AM       EKG:  NSR     Assessment:     Assessment:        ICD-10-CM ICD-9-CM    1. Nonischemic cardiomyopathy (HCC)  I42.8 425.4    2. Mixed hyperlipidemia  E78.2 272.2    3. Dilated cardiomyopathy (HCC)  I42.0 425.4    4. Paroxysmal atrial fibrillation (HCC)  I48.0 427.31 AMB POC EKG ROUTINE W/ 12 LEADS, INTER & REP   5. Essential hypertension  I10 401.9        Orders Placed This Encounter    AMB POC EKG ROUTINE W/ 12 LEADS, INTER & REP     Order Specific Question:   Reason for Exam:     Answer:   Routine        Plan:     Paroxysmal atrial fibrillation  Remains in normal sinus rhythm, now with resolution of nonischemic cardiomyopathy per echo 9/19  Recent episode of palpitations that lasted for about 5 to 7 days, associate with fatigue, suspicious for atrial fibrillation. Check 1 month event monitor now. 2 week event 2/2021: No recurrent AF  Continue Toprol XL 25 mg daily (dose decreased on 7/23/2019 due to bradycardia) for rate control and aspirin. Again today November 12, 2021, he has declined Eliquis despite my recommending it.   I explained that I think he has a 3% chance of stroke every year if he does not take Eliquis due to a blood clot. I explained that the risk of serious or catastrophic bleeding is much less than that on Eliquis. Despite that, he wishes to continue with aspirin 81 mg and no Eliquis. Advised him to let us know if he changes his mind. Recall: Converted spontaneously to normal sinus rhythm with amiodarone, prior to cardioversion. Amio was dc in 1/2021  Atrial Fibrillation CHADSVASC2 Score Stroke Risk:   60 y.o. <65        + 0    male Male     [de-identified]   CHF HX: Yes    +1   HTN HX: Yes    +1   Stroke/TIA/Thromboembolism No    +0   Vascular Disease HX: No    + 0   Diabetes Mellitus No    + 0   CHADSVASC 2 Score 3      Annual Stroke Risk 3.2% - moderate-high             Dilated cardiomyopathy ([address]  Echo 9/3/2019 shows LVEF 50 to 55%, moderately dilated left atrium  Echo done 5/2019 with reduced LVEF 15-20%, mildly dilated LA and mild-mod MR  Likely tachycardia-induced cardiomyopathy which is now resolved with restoration of normal sinus rhythm. Continue with Toprol and Lisinopril for medical management      Lexiscan stress test done 6/2019 without evidence of ischemia     Mixed hyperlipidemia  10/2021    currently on crestor 20 mg daily. He was off Crestor for about a month prior to recent lipid panel showing LDL of 226. Now he is consistently taking it, and we will recheck lipids in 2 months. Likely will need to consider Zetia and or PCSK9 inhibitor in the future.  in 5/2019; Crestor 20 mg daily started. High likelihood of familial HLD given elevated LDL > 190        Counseled on diet and exercise- eventual goal of 30-60 minutes 5-7 times a week as per AHA guidelines.         Continue current care and f/u in 1 year, sooner as needed. Donna Hilario NP    Patient seen and examined by me with the above nurse practitioner. I personally performed all components of the history, physical, and medical decision making and agree with the assessment and plan with minor modifications as noted. Today the patient presents with about 5 days of palpitations suspicious for paroxysmal atrial fibrillation, but now back in sinus rhythm. General PE  Gen:  NAD  Mental Status - Alert. General Appearance - Not in acute distress. HEENT:  PERRL, no carotid bruits or JVD  Chest and Lung Exam   Inspection: Accessory muscles - No use of accessory muscles in breathing. Auscultation:   Breath sounds: - Normal.   Cardiovascular   Inspection: Jugular vein - Bilateral - Inspection Normal.   Palpation/Percussion:   Apical Impulse: - Normal.   Auscultation: Rhythm - Regular. Heart Sounds - S1 WNL and S2 WNL. No S3 or S4. Murmurs & Other Heart Sounds: Auscultation of the heart reveals - No Murmurs. Peripheral Vascular   Upper Extremity: Inspection - Bilateral - No Cyanotic nailbeds or Digital clubbing. Lower Extremity:   Palpation: Edema - Bilateral - No edema. Abdomen:   Soft, non-tender, bowel sounds are active. Neuro: A&O times 3, CN and motor grossly WNL    Check event monitor. I have expressed a high level of concern that the patient's stroke risk is high off of Eliquis, but he declines to consider Eliquis at this time, in favor of aspirin only. He will let us know if he changes his mind. Check lipids in 2 months now that he is back on Crestor regularly. Suspect we will need to add Zetia and/or PCSK9 inhibitor in the future. Follow up to be determined based on test results.

## 2021-11-12 NOTE — PROGRESS NOTES
Chief Complaint   Patient presents with    Irregular Heart Beat      Tachycardia mostly after drinking ice cold fluids and with full stomach     1. Have you been to the ER, urgent care clinic since your last visit? Hospitalized since your last visit? No    2. Have you seen or consulted any other health care providers outside of the [address] [place] [address] since your last visit? Include any pap smears or colon screening.  No

## 2021-11-14 ENCOUNTER — TELEPHONE (OUTPATIENT)
Dept: CARDIOLOGY CLINIC | Age: 66
End: 2021-11-14

## 2021-11-14 NOTE — TELEPHONE ENCOUNTER
I called the patient again and this time I was successful in reaching him. I notified him that his event monitor today showed atrial fibrillation at 126 bpm. He states he had tested himself by drinking a slower P which he thought might cause atrial fibrillation, and indeed it did cause atrial fibrillation. I told him that the risk of stroke has been shown in studies to be even higher when patients are in and out of atrial fibrillation, and I feel strongly that he would benefit from the addition of Eliquis to help prevent a stroke as per our last office note. He expresses understanding that I am concerned that he is at high risk of stroke and continues to decline Eliquis at this time. I advised if he is feeling bad anyway, to go to the emergency room by calling 911. I advised that someone is on-call 24 hours a day if he changes his mind and wishes to protect himself against stroke with Eliquis. Otherwise, we will complete the event monitor and call him with final results when it is done. 

## 2021-11-14 NOTE — TELEPHONE ENCOUNTER
Left message for patient to call me back about event monitor showing new AF at 126 BPM.  I will likely recommend additional Toprol and to start Eliquis as previously advised.

## 2021-12-01 ENCOUNTER — CLINICAL SUPPORT (OUTPATIENT)
Dept: INTERNAL MEDICINE CLINIC | Age: 66
End: 2021-12-01
Payer: COMMERCIAL

## 2021-12-01 ENCOUNTER — TELEPHONE (OUTPATIENT)
Dept: CARDIOLOGY CLINIC | Age: 66
End: 2021-12-01

## 2021-12-01 VITALS — TEMPERATURE: 97.9 F | DIASTOLIC BLOOD PRESSURE: 82 MMHG | SYSTOLIC BLOOD PRESSURE: 119 MMHG | HEART RATE: 85 BPM

## 2021-12-01 DIAGNOSIS — Z23 ENCOUNTER FOR IMMUNIZATION: Primary | ICD-10-CM

## 2021-12-01 PROCEDURE — 90750 HZV VACC RECOMBINANT IM: CPT | Performed by: INTERNAL MEDICINE

## 2021-12-01 PROCEDURE — 90471 IMMUNIZATION ADMIN: CPT | Performed by: INTERNAL MEDICINE

## 2021-12-01 NOTE — PROGRESS NOTES
Identified pt with two pt identifiers(name and ). Reviewed record in preparation for visit and have obtained necessary documentation.   Chief Complaint   Patient presents with    Immunization/Injection     Shingrix        Vitals:    21 1333   BP: 119/82   Pulse: 85   Temp: 97.9 °F (36.6 °C)   TempSrc: Temporal         Karine Givens LPN     [address] [place] [address]  W: [phone]  F: [phone]

## 2021-12-01 NOTE — TELEPHONE ENCOUNTER
Pt was called to inform him that his event monitor reading continue to show rapid atrial fibrillation most of the time. Strongly encouraged pt to make an appointment to see Dr. Larisa Sawyer and discuss options to treat Atrial fibrillation and start taking blood thinner. Briefly explained cardioversion and ablation procedures. Pt seems to think atrial fibrillation is related to his exercise. Re-emphasized the importance of patient being on a blood thinner other than Aspirin to prevent stroke. Pt stated he was going to try to increase his potassium and he would check back with us in 2 weeks.

## 2021-12-23 NOTE — PROGRESS NOTES
Kelly-  Please emphasize to patient that he was in rapid atrial fibrillation 55% of the time, putting him at very high risk of stroke if he does not take a blood thinner. His heart can also fail from going excessively rapidly leading to congestive heart failure. I recommend that he follow ASAP to consider blood thinner as well as further medications to control atrial fibrillation and consideration of possible procedures to eradicate the rhythm. Please let us know his response.

## 2021-12-27 ENCOUNTER — TELEPHONE (OUTPATIENT)
Dept: CARDIOLOGY CLINIC | Age: 66
End: 2021-12-27

## 2021-12-27 NOTE — TELEPHONE ENCOUNTER
Pt called stating someone was supposed to call regarding getting a cardioversion.     Callback [phone]    Thanks  Radha Claire

## 2021-12-29 ENCOUNTER — TELEPHONE (OUTPATIENT)
Dept: CARDIOLOGY CLINIC | Age: 66
End: 2021-12-29

## 2021-12-29 NOTE — TELEPHONE ENCOUNTER
Verified patient with 2 identifiers   Spoke with patient regarding results and recommendations. Patient voiced understanding.    He is scheduled for follow up on 1/3/21 to see Dr Hood Schmid

## 2021-12-29 NOTE — TELEPHONE ENCOUNTER
----- Message from Ozzie Diego MD sent at 12/23/2021  4:50 PM EST -----  Cyril Nguyen-  Please emphasize to patient that he was in rapid atrial fibrillation 55% of the time, putting him at very high risk of stroke if he does not take a blood thinner. His heart can also fail from going excessively rapidly leading to congestive heart failure. I recommend that he follow ASAP to consider blood thinner as well as further medications to control atrial fibrillation and consideration of possible procedures to eradicate the rhythm. Please let us know his response.

## 2022-01-07 NOTE — PATIENT INSTRUCTIONS
Back Strain: Care Instructions Overview A back strain happens when you overstretch, or pull, a muscle in your back. You may hurt your back in an accident or when you exercise or lift something. Sometimes you may not know how you hurt your back. Most back pain will get better with rest and time. You can take care of yourself at home to help your back heal. 
Follow-up care is a key part of your treatment and safety. Be sure to make and go to all appointments, and call your doctor if you are having problems. It's also a good idea to know your test results and keep a list of the medicines you take. How can you care for yourself at home? · Try to stay as active as you can, but stop or reduce any activity that causes pain. · Put ice or a cold pack on the sore muscle for 10 to 20 minutes at a time to stop swelling. Do this twice a day. · Take pain medicines exactly as directed. ? If the doctor gave you a prescription medicine for pain, take it as prescribed. ? If you are not taking a prescription pain medicine, ask your doctor if you can take an over-the-counter medicine. · Try sleeping on your side with a pillow between your legs. Or put a pillow under your knees when you lie on your back. These measures can ease pain in your lower back. · Return to your usual level of activity slowly. When should you call for help? Call 911 anytime you think you may need emergency care. For example, call if: 
  · You are unable to move a leg at all.  
[facility] your doctor now or seek immediate medical care if: 
  · You have new or worse symptoms in your legs, belly, or buttocks. Symptoms may include: 
? Numbness or tingling. ? Weakness. ? Pain.  
  · You lose bladder or bowel control.  
 Watch closely for changes in your health, and be sure to contact your doctor if: 
  · You have a fever, lose weight, or don't feel well.  
  · You are not getting better as expected. Where can you learn more? Go to http://kael-norbert.info/. Enter L683 in the search box to learn more about \"Back Strain: Care Instructions. \" Current as of: June 26, 2019 Content Version: 12.2 © 4245-8407 [employer]. Care instructions adapted under license by [employer] (which disclaims liability or warranty for this information). If you have questions about a medical condition or this instruction, always ask your healthcare professional. [place] [address] any warranty or liability for your use of this information. Take motrin with breakfast and dinner for next 7 days. Start stretches on Thursday. If not improving by Friday, then would make an appt to see PT. Start 81 mg aspirin after you have finished motrin. Take valium only at night time for now,as it will likely make you quite tired. Render Post-Care Instructions In Note?: yes Number Of Freeze-Thaw Cycles: 3 freeze-thaw cycles Consent: The patient's consent was obtained including but not limited to risks of crusting, scabbing, blistering, scarring, darker or lighter pigmentary change, recurrence, incomplete removal and infection. Detail Level: Detailed Render Note In Bullet Format When Appropriate: No Post-Care Instructions: I reviewed with the patient in detail post-care instructions. Patient is to wear sunprotection, and avoid picking at any of the treated lesions. Pt may apply Vaseline to crusted or scabbing areas. Duration Of Freeze Thaw-Cycle (Seconds): 3

## 2022-01-10 ENCOUNTER — OFFICE VISIT (OUTPATIENT)
Dept: CARDIOLOGY CLINIC | Age: 67
End: 2022-01-10

## 2022-01-10 VITALS
WEIGHT: 175 LBS | BODY MASS INDEX: 26.52 KG/M2 | DIASTOLIC BLOOD PRESSURE: 60 MMHG | SYSTOLIC BLOOD PRESSURE: 110 MMHG | HEIGHT: 68 IN | HEART RATE: 120 BPM | OXYGEN SATURATION: 98 %

## 2022-01-10 DIAGNOSIS — I42.8 NONISCHEMIC CARDIOMYOPATHY (HCC): ICD-10-CM

## 2022-01-10 DIAGNOSIS — E78.2 MIXED HYPERLIPIDEMIA: ICD-10-CM

## 2022-01-10 DIAGNOSIS — I10 ESSENTIAL HYPERTENSION: ICD-10-CM

## 2022-01-10 DIAGNOSIS — I42.0 DILATED CARDIOMYOPATHY (HCC): ICD-10-CM

## 2022-01-10 DIAGNOSIS — I48.0 PAROXYSMAL ATRIAL FIBRILLATION (HCC): Primary | ICD-10-CM

## 2022-01-10 PROCEDURE — 99214 OFFICE O/P EST MOD 30 MIN: CPT | Performed by: INTERNAL MEDICINE

## 2022-01-10 PROCEDURE — 1101F PT FALLS ASSESS-DOCD LE1/YR: CPT | Performed by: INTERNAL MEDICINE

## 2022-01-10 PROCEDURE — G8536 NO DOC ELDER MAL SCRN: HCPCS | Performed by: INTERNAL MEDICINE

## 2022-01-10 PROCEDURE — G8427 DOCREV CUR MEDS BY ELIG CLIN: HCPCS | Performed by: INTERNAL MEDICINE

## 2022-01-10 PROCEDURE — G8510 SCR DEP NEG, NO PLAN REQD: HCPCS | Performed by: INTERNAL MEDICINE

## 2022-01-10 PROCEDURE — G9711 PT HX TOT COL OR COLON CA: HCPCS | Performed by: INTERNAL MEDICINE

## 2022-01-10 PROCEDURE — 93000 ELECTROCARDIOGRAM COMPLETE: CPT | Performed by: INTERNAL MEDICINE

## 2022-01-10 PROCEDURE — G8754 DIAS BP LESS 90: HCPCS | Performed by: INTERNAL MEDICINE

## 2022-01-10 PROCEDURE — G8419 CALC BMI OUT NRM PARAM NOF/U: HCPCS | Performed by: INTERNAL MEDICINE

## 2022-01-10 PROCEDURE — G8752 SYS BP LESS 140: HCPCS | Performed by: INTERNAL MEDICINE

## 2022-01-10 NOTE — LETTER
1/10/2022    Patient: Margaret Lao   YOB: 1955   Date of Visit: 1/10/2022     Maddison Gordon III DO  [address]  [facility]  [address] [facility] [address]    Dear Darling Harrison DO,      Thank you for referring Mr. Yumiko Pritchard to [address] [place] [address] for evaluation. My notes for this consultation are attached. If you have questions, please do not hesitate to call me. I look forward to following your patient along with you.       Sincerely,    Geo Watt MD

## 2022-01-10 NOTE — PROGRESS NOTES
[address], [address] [facility]  [phone]     Subjective:      Osbaldo Neri is a 73 y.o. male is here for routine f/u. He has pmhx PAF NICM HTN and HLD. Last seen by us in 11/12/2021:  He reported episode of fast HR --- after drinking ice cold drinks or after eating. He stated that he felt rapid heartbeat and fatigue for about a week after he went to this fast heartbeat, and then it resolved spontaneously. At that visit, he was in sinus rhythm. He also ran out of his cholesterol medicine for about a month prior to this. His recent lipid panel reflects being off of Crestor for a month. We obtained event monitor 12/13/2021 and showed that he is in AF 55% of the time. We recommended adding eliquis which he refused. Recall that in 2/2021, we dc amio and we obtained 2 week event monitor which did not show recurrent AF. Today, presents in AFL. Denies any cardiac complaints. The patient denies chest pain/ shortness of breath, orthopnea, PND, LE edema, palpitations, syncope, or presyncope. 2 week event 2/2021    Arrhthymias:   Occasional PVCs with a burden of 2%, and rare PACs with a burden of 1%.  No atrial fibrillation noted.      Patient Active Problem List    Diagnosis Date Noted    Mixed hyperlipidemia 08/30/2019    Nonischemic cardiomyopathy ([address] 02/46/6839    Systolic CHF, chronic ([address] 08/30/2019    Colon cancer ([address] 08/30/2019    Atrial fibrillation ([address] 06/03/2019      Jordin Thomas DO  Past Medical History:   Diagnosis Date    Atrial fibrillation ([address]     Cancer ([address]     Congestive heart failure ([address]     Hypercholesterolemia     Hypertension       Past Surgical History:   Procedure Laterality Date    HX APPENDECTOMY  2011    HX HERNIA REPAIR      HX TOTAL COLECTOMY  09/2013    cancer     No Known Allergies   Family History   Problem Relation Age of Onset    Cancer Mother     Coronary Art Dis Father    Walter Solders Cancer Sister     Cancer Brother     Emphysema Brother       Social History     Socioeconomic History    Marital status:      Spouse name: Not on file    Number of children: Not on file    Years of education: Not on file    Highest education level: Not on file   Occupational History    Not on file   Tobacco Use    Smoking status: Never Smoker    Smokeless tobacco: Never Used   Substance and Sexual Activity    Alcohol use: Not Currently     Comment: rarely    Drug use: Never    Sexual activity: Not Currently   Other Topics Concern    Not on file   Social History Narrative    Not on file     Social Determinants of Health     Financial Resource Strain:     Difficulty of Paying Living Expenses: Not on file   Food Insecurity:     Worried About Running Out of Food in the Last Year: Not on file    Evelina of Food in the Last Year: Not on file   Transportation Needs:     Lack of Transportation (Medical): Not on file    Lack of Transportation (Non-Medical):  Not on file   Physical Activity:     Days of Exercise per Week: Not on file    Minutes of Exercise per Session: Not on file   Stress:     Feeling of Stress : Not on file   Social Connections:     Frequency of Communication with Friends and Family: Not on file    Frequency of Social Gatherings with Friends and Family: Not on file    Attends Jainism Services: Not on file    Active Member of [address] [place] [address] [employer] or Organizations: Not on file    Attends Club or Organization Meetings: Not on file    Marital Status: Not on file   Intimate Partner Violence:     Fear of Current or Ex-Partner: Not on file    Emotionally Abused: Not on file    Physically Abused: Not on file    Sexually Abused: Not on file   Housing Stability:     Unable to Pay for Housing in the Last Year: Not on file    Number of [place] in the Last Year: Not on file    Unstable Housing in the Last Year: Not on file      Current Outpatient Medications   Medication Sig    apixaban (ELIQUIS) 5 mg tablet Take 1 Tablet by mouth two (2) times a day. Replaces aspirin on 1/10/2022    metoprolol succinate (TOPROL-XL) 25 mg XL tablet Take 1 tablet by mouth once daily    rosuvastatin (CRESTOR) 20 mg tablet Take 1 Tablet by mouth nightly.  lisinopriL (PRINIVIL, ZESTRIL) 2.5 mg tablet Take 1 tablet by mouth once daily     No current facility-administered medications for this visit. Review of Symptoms:  11 systems reviewed, negative other than as stated in the HPI    Physical ExamPhysical Exam:    Vitals:    01/10/22 1018   BP: 110/60   Pulse: (!) 120   SpO2: 98%   Weight: 175 lb (79.4 kg)   Height: 5' 8\" (1.727 m)     Body mass index is 26.61 kg/m². General PE  Gen:  NAD  Mental Status - Alert. General Appearance - Not in acute distress. HEENT:  PERRL, no carotid bruits or JVD  Chest and Lung Exam   Inspection: Accessory muscles - No use of accessory muscles in breathing. Auscultation:   Breath sounds: - Normal.   Cardiovascular   Inspection: Jugular vein - Bilateral - Inspection Normal.   Palpation/Percussion:   Apical Impulse: - Normal.   Auscultation: Rhythm - IRRegular. Heart Sounds - S1 WNL and S2 WNL. No S3 or S4. Murmurs & Other Heart Sounds: Auscultation of the heart reveals - No Murmurs. Peripheral Vascular   Upper Extremity: Inspection - Bilateral - No Cyanotic nailbeds or Digital clubbing. Lower Extremity:   Palpation: Edema - Bilateral - No edema. Abdomen:   Soft, non-tender, bowel sounds are active.   Neuro: A&O times 3, CN and motor grossly WNL    Labs:   Lab Results   Component Value Date/Time    Cholesterol, total 326 (H) 10/05/2021 09:35 AM    Cholesterol, total 203 (H) 01/15/2021 09:10 AM    Cholesterol, total 300 (H) 05/23/2019 10:04 AM    HDL Cholesterol 48 10/05/2021 09:35 AM    HDL Cholesterol 57 01/15/2021 09:10 AM    HDL Cholesterol 51 05/23/2019 10:04 AM    LDL, calculated 238.6 (H) 10/05/2021 09:35 AM    LDL, calculated 125 (H) 01/15/2021 09:10 AM LDL, calculated 220 (H) 05/23/2019 10:04 AM    Triglyceride 197 (H) 10/05/2021 09:35 AM    Triglyceride 116 01/15/2021 09:10 AM    Triglyceride 143 05/23/2019 10:04 AM    CHOL/HDL Ratio 6.8 (H) 10/05/2021 09:35 AM     No results found for: CPK, CPKX, CPX  Lab Results   Component Value Date/Time    Sodium 140 10/05/2021 09:35 AM    Potassium 4.7 10/05/2021 09:35 AM    Chloride 109 (H) 10/05/2021 09:35 AM    CO2 26 10/05/2021 09:35 AM    Anion gap 5 10/05/2021 09:35 AM    Glucose 106 (H) 10/05/2021 09:35 AM    BUN 19 10/05/2021 09:35 AM    Creatinine 1.22 10/05/2021 09:35 AM    BUN/Creatinine ratio 16 10/05/2021 09:35 AM    GFR est AA >60 10/05/2021 09:35 AM    GFR est non-AA 60 (L) 10/05/2021 09:35 AM    Calcium 9.5 10/05/2021 09:35 AM    Bilirubin, total 0.8 10/05/2021 09:35 AM    Alk. phosphatase 80 10/05/2021 09:35 AM    Protein, total 7.5 10/05/2021 09:35 AM    Albumin 3.7 10/05/2021 09:35 AM    Globulin 3.8 10/05/2021 09:35 AM    A-G Ratio 1.0 (L) 10/05/2021 09:35 AM    ALT (SGPT) 18 10/05/2021 09:35 AM       EKG:  Atrial fibrillation/flutter     Assessment:          ICD-10-CM ICD-9-CM    1. Paroxysmal atrial fibrillation (HCC)  I48.0 427.31 AMB POC EKG ROUTINE W/ 12 LEADS, INTER & REP   2. Nonischemic cardiomyopathy (HCC)  I42.8 425.4    3. Mixed hyperlipidemia  E78.2 272.2    4. Essential hypertension  I10 401.9    5. Dilated cardiomyopathy (HCC)  I42.0 425.4        Orders Placed This Encounter    AMB POC EKG ROUTINE W/ 12 LEADS, INTER & REP     Order Specific Question:   Reason for Exam:     Answer:   Routine    DISCONTD: apixaban (ELIQUIS) 5 mg tablet     Sig: Take 1 Tablet by mouth two (2) times a day. Replaces aspirin on 1/10/2022     Dispense:  180 Tablet     Refill:  4    apixaban (ELIQUIS) 5 mg tablet     Sig: Take 1 Tablet by mouth two (2) times a day.  Replaces aspirin on 1/10/2022     Dispense:  180 Tablet     Refill:  4        Plan:       Paroxysmal atrial fibrillation  Presents in atrial fibrillation/flutter  1 mos event monitor 12/2021: Intermittent paroxysmal atrial fibrillation with a heart rate range of 60 to 172 bpm.  Total atrial fibrillation burden 55%. 2 week event 2/2021: No recurrent AF  Continue Toprol XL 25 mg daily (dose decreased on 7/23/2019 due to bradycardia) for rate control and aspirin. Start Eliquis 5 mg BID-I discussed the risks and benefits with the patient who is now willing to take it understanding that the benefit exceeds risks  Discussed risks and benefits of anticoagulation, signs and symptoms of bleeding, and to call if any concerns. 30-day free voucher and discount card provided, advised the patient to investigate cost of the second prescription now and let us know if too expensive  Can consider antiarrhythmic and/or referral to electrophysiology in 3 months at follow-up if compliant with Eliquis, and may need to repeat a monitor to verify whether sustained atrial dysrhythmia or intermittent  Recall: Converted spontaneously to normal sinus rhythm with amiodarone, prior to cardioversion. Amio was dc in 1/2021          Atrial Fibrillation CHADSVASC2 Score Stroke Risk:   60 y.o. <65        + 0    male Male     [de-identified]   CHF HX: Yes    +1   HTN HX: Yes    +1   Stroke/TIA/Thromboembolism No    +0   Vascular Disease HX: No    + 0   Diabetes Mellitus No    + 0   CHADSVASC 2 Score 3      Annual Stroke Risk 3.2% - moderate-high             Dilated cardiomyopathy ([address]  Echo 9/3/2019 shows LVEF 50 to 55%, moderately dilated left atrium  Echo done 5/2019 with reduced LVEF 15-20%, mildly dilated LA and mild-mod MR  Likely tachycardia-induced cardiomyopathy which is now resolved with restoration of normal sinus rhythm.   Continue with Toprol and Lisinopril for medical management   If still in atrial fibrillation/flutter at follow-up, will likely repeat echo at that time to verify no recurrent cardiomyopathy     Lexiscan stress test done 6/2019 without evidence of ischemia     Mixed hyperlipidemia  10/2021    currently on crestor 20 mg daily. He has repeat lab order slip  He was off Crestor for about a month prior to recent lipid panel showing LDL of 226. Now he is consistently taking it, and we will recheck lipids in 2 months. Likely will need to consider Zetia and or PCSK9 inhibitor in the future.  in 5/2019; Crestor 20 mg daily started. High likelihood of familial HLD given elevated LDL > 190        Counseled on diet and exercise- eventual goal of 30-60 minutes 5-7 times a week as per AHA guidelines.       Continue current care and f/u in  3 months, sooner as needed    Melba Turner NP    Patient seen and examined by me with the above nurse practitioner. I personally performed all components of the history, physical, and medical decision making and agree with the assessment and plan with minor modifications as noted. Today the patient presents with atrial fibrillation/flutter today, with recent event monitor showing 55% atrial fibrillation burden. General PE  Gen:  NAD  Mental Status - Alert. General Appearance - Not in acute distress. HEENT:  PERRL, no carotid bruits or JVD  Chest and Lung Exam   Inspection: Accessory muscles - No use of accessory muscles in breathing. Auscultation:   Breath sounds: - Normal.   Cardiovascular   Inspection: Jugular vein - Bilateral - Inspection Normal.   Palpation/Percussion:   Apical Impulse: - Normal.   Auscultation: Rhythm -irregular. Heart Sounds - S1 WNL and S2 WNL. No S3 or S4. Murmurs & Other Heart Sounds: Auscultation of the heart reveals - No Murmurs. Peripheral Vascular   Upper Extremity: Inspection - Bilateral - No Cyanotic nailbeds or Digital clubbing. Lower Extremity:   Palpation: Edema - Bilateral - No edema. Abdomen:   Soft, non-tender, bowel sounds are active. Neuro: A&O times 3, CN and motor grossly WNL    Change aspirin to Eliquis. Patient now agrees that the benefit exceeds risk. Will not pursue antiarrhythmic and/or EP referral at this time until we verify he has been compliant with Eliquis for 3 months and tolerating. Follow up in 3 months, sooner as needed.

## 2022-03-18 PROBLEM — I50.22 SYSTOLIC CHF, CHRONIC (HCC): Status: ACTIVE | Noted: 2019-08-30

## 2022-03-19 PROBLEM — C18.9 COLON CANCER (HCC): Status: ACTIVE | Noted: 2019-08-30

## 2022-03-19 PROBLEM — I48.91 ATRIAL FIBRILLATION (HCC): Status: ACTIVE | Noted: 2019-06-03

## 2022-03-19 PROBLEM — E78.2 MIXED HYPERLIPIDEMIA: Status: ACTIVE | Noted: 2019-08-30

## 2022-03-19 PROBLEM — I42.8 NONISCHEMIC CARDIOMYOPATHY (HCC): Status: ACTIVE | Noted: 2019-08-30

## 2022-04-08 ENCOUNTER — OFFICE VISIT (OUTPATIENT)
Dept: INTERNAL MEDICINE CLINIC | Age: 67
End: 2022-04-08
Payer: MEDICARE

## 2022-04-08 VITALS
TEMPERATURE: 98 F | WEIGHT: 179.8 LBS | DIASTOLIC BLOOD PRESSURE: 61 MMHG | OXYGEN SATURATION: 98 % | RESPIRATION RATE: 18 BRPM | BODY MASS INDEX: 27.25 KG/M2 | HEART RATE: 97 BPM | HEIGHT: 68 IN | SYSTOLIC BLOOD PRESSURE: 100 MMHG

## 2022-04-08 DIAGNOSIS — Z00.00 WELCOME TO MEDICARE PREVENTIVE VISIT: Primary | ICD-10-CM

## 2022-04-08 DIAGNOSIS — H91.93 BILATERAL HEARING LOSS, UNSPECIFIED HEARING LOSS TYPE: ICD-10-CM

## 2022-04-08 DIAGNOSIS — M54.31 RIGHT SIDED SCIATICA: ICD-10-CM

## 2022-04-08 DIAGNOSIS — E78.2 MIXED HYPERLIPIDEMIA: ICD-10-CM

## 2022-04-08 DIAGNOSIS — Z23 ENCOUNTER FOR IMMUNIZATION: ICD-10-CM

## 2022-04-08 DIAGNOSIS — N18.30 STAGE 3 CHRONIC KIDNEY DISEASE, UNSPECIFIED WHETHER STAGE 3A OR 3B CKD (HCC): ICD-10-CM

## 2022-04-08 DIAGNOSIS — C18.7 MALIGNANT NEOPLASM OF SIGMOID COLON (HCC): ICD-10-CM

## 2022-04-08 DIAGNOSIS — I48.0 PAROXYSMAL ATRIAL FIBRILLATION (HCC): ICD-10-CM

## 2022-04-08 DIAGNOSIS — I42.8 NONISCHEMIC CARDIOMYOPATHY (HCC): ICD-10-CM

## 2022-04-08 PROCEDURE — G0402 INITIAL PREVENTIVE EXAM: HCPCS | Performed by: INTERNAL MEDICINE

## 2022-04-08 PROCEDURE — G8754 DIAS BP LESS 90: HCPCS | Performed by: INTERNAL MEDICINE

## 2022-04-08 PROCEDURE — 1101F PT FALLS ASSESS-DOCD LE1/YR: CPT | Performed by: INTERNAL MEDICINE

## 2022-04-08 PROCEDURE — G0403 EKG FOR INITIAL PREVENT EXAM: HCPCS | Performed by: INTERNAL MEDICINE

## 2022-04-08 PROCEDURE — G8419 CALC BMI OUT NRM PARAM NOF/U: HCPCS | Performed by: INTERNAL MEDICINE

## 2022-04-08 PROCEDURE — G8510 SCR DEP NEG, NO PLAN REQD: HCPCS | Performed by: INTERNAL MEDICINE

## 2022-04-08 PROCEDURE — G8427 DOCREV CUR MEDS BY ELIG CLIN: HCPCS | Performed by: INTERNAL MEDICINE

## 2022-04-08 PROCEDURE — G8536 NO DOC ELDER MAL SCRN: HCPCS | Performed by: INTERNAL MEDICINE

## 2022-04-08 PROCEDURE — 99213 OFFICE O/P EST LOW 20 MIN: CPT | Performed by: INTERNAL MEDICINE

## 2022-04-08 PROCEDURE — G9711 PT HX TOT COL OR COLON CA: HCPCS | Performed by: INTERNAL MEDICINE

## 2022-04-08 PROCEDURE — 90732 PPSV23 VACC 2 YRS+ SUBQ/IM: CPT | Performed by: INTERNAL MEDICINE

## 2022-04-08 PROCEDURE — G8752 SYS BP LESS 140: HCPCS | Performed by: INTERNAL MEDICINE

## 2022-04-08 PROCEDURE — G0009 ADMIN PNEUMOCOCCAL VACCINE: HCPCS | Performed by: INTERNAL MEDICINE

## 2022-04-08 NOTE — PATIENT INSTRUCTIONS
Medicare Wellness Visit, Male    The best way to live healthy is to have a lifestyle where you eat a well-balanced diet, exercise regularly, limit alcohol use, and quit all forms of tobacco/nicotine, if applicable. Regular preventive services are another way to keep healthy. Preventive services (vaccines, screening tests, monitoring & exams) can help personalize your care plan, which helps you manage your own care. Screening tests can find health problems at the earliest stages, when they are easiest to treat. [facility] follows the current, evidence-based guidelines published by the [facility] ([facility]) when recommending preventive services for our patients. Because we follow these guidelines, sometimes recommendations change over time as research supports it. (For example, a prostate screening blood test is no longer routinely recommended for men with no symptoms). Of course, you and your doctor may decide to screen more often for some diseases, based on your risk and co-morbidities (chronic disease you are already diagnosed with). Preventive services for you include:  - Medicare offers their members a free annual wellness visit, which is time for you and your primary care provider to discuss and plan for your preventive service needs. Take advantage of this benefit every year!  -All adults over age 72 should receive the recommended pneumonia vaccines. Current USPSTF guidelines recommend a series of two vaccines for the best pneumonia protection.   -All adults should have a flu vaccine yearly and tetanus vaccine every 10 years.  -All adults age 48 and older should receive the shingles vaccines (series of two vaccines).        -All adults age 38-68 who are overweight should have a diabetes screening test once every three years.   -Other screening tests & preventive services for persons with diabetes include: an eye exam to screen for diabetic retinopathy, a kidney function test, a foot exam, and stricter control over your cholesterol.   -Cardiovascular screening for adults with routine risk involves an electrocardiogram (ECG) at intervals determined by the provider.   -Colorectal cancer screening should be done for adults age 54-65 with no increased risk factors for colorectal cancer. There are a number of acceptable methods of screening for this type of cancer. Each test has its own benefits and drawbacks. Discuss with your provider what is most appropriate for you during your annual wellness visit. The different tests include: colonoscopy (considered the best screening method), a fecal occult blood test, a fecal DNA test, and sigmoidoscopy.  -All adults born between [facility] should be screened once for Hepatitis C.  -An Abdominal Aortic Aneurysm (AAA) Screening is recommended for men age 73-68 who has ever smoked in their lifetime.      Here is a list of your current Health Maintenance items (your personalized list of preventive services) with a due date:  Health Maintenance Due   Topic Date Due    DTaP/Tdap/Td  (1 - Tdap) Never done    Pneumococcal Vaccine (2 of 2 - PPSV23) 11/29/2021

## 2022-04-08 NOTE — PROGRESS NOTES
1. \"Have you been to the ER, urgent care clinic since your last visit? Hospitalized since your last visit? \" no    2. \"Have you seen or consulted any other health care providers outside of the [address] [place] [address] since your last visit? \"  no    3. For patients aged 39-70: Has the patient had a colonoscopy / FIT/ Cologuard?  yes

## 2022-04-08 NOTE — PROGRESS NOTES
Sorin Zapata is a 73 y.o. male who presents for evaluation of welcome to medicare visit. Last seen by me oct 5, 2021. Overall doing ok, though did tweek his back in march while raking leaves. Had some right sided sciatica, which has resolved. Wanted to be evaluated. Used ice to help. Atrial fib has also been more prevalent of late. He had been seeing dr Maritza Saucedo, but will be switching to dr Natasha Frank.       ROS:  Constitutional: negative for fevers, chills, anorexia and weight loss  Eyes:   negative for visual disturbance and irritation  ENT:   negative for tinnitus,sore throat,nasal congestion,ear pain,hoarseness  Respiratory:  negative for cough, hemoptysis, dyspnea,wheezing  CV:   negative for chest pain, palpitations, lower extremity edema  GI:   negative for nausea, vomiting, diarrhea, abdominal pain,melena  Genitourinary: negative for frequency, dysuria and hematuria  Musculoskel: negative for myalgias, arthralgias, back pain, muscle weakness, joint pain  Neurological:  negative for headaches, dizziness, focal weakness, numbness  Psychiatric:     Negative for depression or anxiety      Past Medical History:   Diagnosis Date    Atrial fibrillation (HCC)     [facility]     Congestive heart failure ([facility]     Hypercholesterolemia     Hypertension        Past Surgical History:   Procedure Laterality Date    HX APPENDECTOMY  2011    HX HERNIA REPAIR      HX TOTAL COLECTOMY  09/2013    cancer       Family History   Problem Relation Age of Onset    Cancer Mother     Coronary Art Dis Father     Cancer Sister     Cancer Brother     Emphysema Brother        Social History     Socioeconomic History    Marital status:      Spouse name: Not on file    Number of children: Not on file    Years of education: Not on file    Highest education level: Not on file   Occupational History    Not on file   Tobacco Use    Smoking status: Never Smoker    Smokeless tobacco: Never Used   Substance and Sexual Activity    Alcohol use: Not Currently     Comment: rarely    Drug use: Never    Sexual activity: Not Currently   Other Topics Concern    Not on file   Social History Narrative    Not on file     Social Determinants of Health     Financial Resource Strain:     Difficulty of Paying Living Expenses: Not on file   Food Insecurity:     Worried About Running Out of Food in the Last Year: Not on file    Evelina of Food in the Last Year: Not on file   Transportation Needs:     Lack of Transportation (Medical): Not on file    Lack of Transportation (Non-Medical):  Not on file   Physical Activity:     Days of Exercise per Week: Not on file    Minutes of Exercise per Session: Not on file   Stress:     Feeling of Stress : Not on file   Social Connections:     Frequency of Communication with Friends and Family: Not on file    Frequency of Social Gatherings with Friends and Family: Not on file    Attends Latter-day Services: Not on file    Active Member of [address] [place] [address] or Organizations: Not on file    Attends Club or Organization Meetings: Not on file    Marital Status: Not on file   Intimate Partner Violence:     Fear of Current or Ex-Partner: Not on file    Emotionally Abused: Not on file    Physically Abused: Not on file    Sexually Abused: Not on file   Housing Stability:     Unable to Pay for Housing in the Last Year: Not on file    Number of [place] in the Last Year: Not on file    Unstable Housing in the Last Year: Not on file            Visit Vitals  /61 (BP 1 Location: Left upper arm, BP Patient Position: Sitting)   Pulse 97   Temp 98 °F (36.7 °C) (Temporal)   Resp 18   Ht 5' 8\" (1.727 m)   Wt 179 lb 12.8 oz (81.6 kg)   SpO2 98%   BMI 27.34 kg/m²       Physical Examination:   General - Well appearing male  HEENT - PERRL, TM no erythema/opacification, normal nasal turbinates, no oropharyngeal erythema or exudate, MMM  Neck - supple, no bruits, no thyroidomegaly, no lymphadenopathy  Pulm - clear to auscultation bilaterally  Cardio - irreglar irregular, normal S1 S2, no murmur  Abd - soft, nontender, no masses, no HSM  Extrem - no edema, +2 distal pulses. Negative straight leg raising bilaterally, however, very tight with both legs. Neuro-  No focal deficits, CN intact    Ecg:  Atrial fib,  bpm     Assessment/Plan:    1. Right sided sciatica--straight leg raising is negative. Referral to PT. Symptoms have in essence resolved, so don't think he needs any imaging or referral to ortho or n/s  2. htn--controlled with toprol xl, lisinopril  3. pafib--in atrial fib today, on toprol xl, eliquis  4.  chroinc systolic chf--well compensated, follows with dr ledbettre  5.  predm--last a1c 5.7  6. Mixed hyperlipids--on crestor  7. Bilateral hearing loss--referral to ent, dr Gabi reyez    Pneumovax 23 given today. rtc one year        August Goltz III, DO            This is a \"Welcome to [employer]"  Initial Preventive Physical Examination (IPPE) providing Personalized Prevention Plan Services (Performed in the first 12 months of enrollment)    I have reviewed the patient's medical history in detail and updated the computerized patient record. Assessment/Plan   Education and counseling provided:  Are appropriate based on today's review and evaluation  End-of-Life planning (with patient's consent)  Pneumococcal Vaccine  Influenza Vaccine  Prostate cancer screening tests (PSA, covered annually)  Screening for glaucoma    1.  Welcome to Medicare preventive visit       Depression Risk Screen     3 most recent PHQ Screens 4/8/2022   Little interest or pleasure in doing things Not at all   Feeling down, depressed, irritable, or hopeless Not at all   Total Score PHQ 2 0       Alcohol & Drug Abuse Risk Screen    Do you average more than 1 drink per night or more than 7 drinks a week: No    In the past three months have you have had more than 4 drinks containing alcohol on one occasion: No          Functional Ability and Level of Safety    Diet: The patient is prescribed and follows a special diet. cut back on sodas, carbs      Hearing: The patient needs further evaluation. Vision Screening:  Vision is good. No exam data present      Activities of Daily Living: The home contains: no safety equipment. Patient does total self care      Ambulation: with no difficulty      Exercise level: tries to do light work most days. Fall Risk Screen:  Fall Risk Assessment, last 12 mths 4/8/2022   Able to walk? Yes   Fall in past 12 months? 0   Do you feel unsteady? 0   Are you worried about falling 0      Abuse Screen:  Patient is not abused. Lives with wife of 10+ years. Screening EKG   EKG order placed: Yes    End of Life Planning   Advanced care planning directives were discussed with the patient and /or family/caregiver. Health Maintenance Due     Health Maintenance Due   Topic Date Due    DTaP/Tdap/Td series (1 - Tdap) Never done    Pneumococcal 65+ years (2 of 2 - PPSV23) 11/29/2021       Patient Care Team   Patient Care Team:  Jony Carlin DO as PCP - General (Internal Medicine)  Jony Carlin DO as PCP - [facility]  Luis Clifton MD as Physician (Cardiology)    History     Past Medical History:   Diagnosis Date    Atrial fibrillation ([address]     Cancer ([address]     [facility]     Hypercholesterolemia     Hypertension       Past Surgical History:   Procedure Laterality Date    HX APPENDECTOMY  2011    HX HERNIA REPAIR      HX TOTAL COLECTOMY  09/2013    cancer     Current Outpatient Medications   Medication Sig Dispense Refill    metoprolol succinate (TOPROL-XL) 25 mg XL tablet Take 1 tablet by mouth once daily 90 Tablet 2    apixaban (ELIQUIS) 5 mg tablet Take 1 Tablet by mouth two (2) times a day.  Replaces aspirin on 1/10/2022 180 Tablet 4    rosuvastatin (CRESTOR) 20 mg tablet Take 1 Tablet by mouth nightly.  90 Tablet 3    lisinopriL (PRINIVIL, ZESTRIL) 2.5 mg tablet Take 1 tablet by mouth once daily 90 Tablet 3     No Known Allergies    Family History   Problem Relation Age of Onset    Cancer Mother     Coronary Art Dis Father     Cancer Sister     Cancer Brother     Emphysema Brother      Social History     Tobacco Use    Smoking status: Never Smoker    Smokeless tobacco: Never Used   Substance Use Topics    Alcohol use: Not Currently     Comment: rarely       Nolvia White III, DO

## 2022-08-15 RX ORDER — LISINOPRIL 2.5 MG/1
2.5 TABLET ORAL DAILY
Qty: 30 TABLET | Refills: 0 | Status: SHIPPED | OUTPATIENT
Start: 2022-08-15

## 2022-08-15 NOTE — TELEPHONE ENCOUNTER
Cardiologist: Dr. Katty Eric    Last appt: Visit date not found  Future Appointments   Date Time Provider [place]   4/10/2023  3:00 PM Adilson Barrios [facility]       Requested Prescriptions     Signed Prescriptions Disp Refills    lisinopriL (PRINIVIL, ZESTRIL) 2.5 mg tablet 30 Tablet 0     Sig: Take 1 Tablet by mouth in the morning. **FOLLOW UP WITH DR HUSAIN FOR FURTHER REFILLS**     Authorizing Provider: Ronda De Leon     Ordering User: THERESA DAWSON         Refills VO per Dr. Katty Eric.

## 2022-09-20 RX ORDER — LISINOPRIL 2.5 MG/1
TABLET ORAL
Qty: 30 TABLET | Refills: 0 | OUTPATIENT
Start: 2022-09-20

## 2022-09-20 NOTE — TELEPHONE ENCOUNTER
PCP: Xenia Williamson DO    Last appt: 1/2022  Future Appointments   Date Time Provider [place]   4/10/2023  3:00 PM Mimi Barrios III, DO [facility]       Requested Prescriptions     Refused Prescriptions Disp Refills    lisinopriL (PRINIVIL, ZESTRIL) 2.5 mg tablet [Pharmacy Med Name: Lisinopril 2.5 MG Oral Tablet] 30 Tablet 0     Sig: TAKE 1 TABLET BY MOUTH IN THE MORNING . ** FOLLOW UP WITH DR BRASWELL FOR FUTURE REFILLS **     Refused By: JIHAN Anderson     Reason for Refusal: Appt required, please call patient         Other Comments:  Pt instructed to follow up in 3 months. Pt cancelled 4/2022 appt, 5/2022 appt and no showed for Dr. Krissy Crump in May 2022. NO upcoming appts. Seen recently by pcp. Request forwarded to pcp.

## 2022-09-22 RX ORDER — LISINOPRIL 2.5 MG/1
TABLET ORAL
Qty: 30 TABLET | Refills: 0 | OUTPATIENT
Start: 2022-09-22

## 2022-09-22 NOTE — TELEPHONE ENCOUNTER
PCP: Radha Arevalo DO     Last appt: 1/2022         Future Appointments   Date Time Provider [place]   4/10/2023  3:00 PM Hina Barrios Part III, DO [facility]         Requested Prescriptions             Refused Prescriptions Disp Refills    lisinopriL (PRINIVIL, ZESTRIL) 2.5 mg tablet [Pharmacy Med Name: Lisinopril 2.5 MG Oral Tablet] 30 Tablet 0       Sig: TAKE 1 TABLET BY MOUTH IN THE MORNING . ** FOLLOW UP WITH DR SAPP FOR FUTURE REFILLS **       Refused By: JIHAN Varghese       Reason for Refusal: Appt required, please call patient            Other Comments:  Pt instructed to follow up in 3 months. Pt cancelled 4/2022 appt, 5/2022 appt and no showed for Dr. Edison Garnica in May 2022. NO upcoming appts. Seen recently by pcp. Request forwarded to pcp.

## 2022-12-23 ENCOUNTER — APPOINTMENT (OUTPATIENT)
Dept: GENERAL RADIOLOGY | Age: 67
End: 2022-12-23
Attending: STUDENT IN AN ORGANIZED HEALTH CARE EDUCATION/TRAINING PROGRAM
Payer: MEDICARE

## 2022-12-23 ENCOUNTER — HOSPITAL ENCOUNTER (EMERGENCY)
Age: 67
Discharge: HOME | End: 2022-12-23
Attending: STUDENT IN AN ORGANIZED HEALTH CARE EDUCATION/TRAINING PROGRAM
Payer: MEDICARE

## 2022-12-23 VITALS
RESPIRATION RATE: 20 BRPM | HEART RATE: 99 BPM | OXYGEN SATURATION: 95 % | TEMPERATURE: 99.3 F | DIASTOLIC BLOOD PRESSURE: 84 MMHG | SYSTOLIC BLOOD PRESSURE: 113 MMHG

## 2022-12-23 DIAGNOSIS — I48.91 ATRIAL FIBRILLATION, UNSPECIFIED TYPE (HCC): Primary | ICD-10-CM

## 2022-12-23 DIAGNOSIS — I42.0 DILATED CARDIOMYOPATHY (HCC): ICD-10-CM

## 2022-12-23 DIAGNOSIS — U07.1 COVID: ICD-10-CM

## 2022-12-23 DIAGNOSIS — I48.0 PAROXYSMAL ATRIAL FIBRILLATION (HCC): ICD-10-CM

## 2022-12-23 LAB
ALBUMIN SERPL-MCNC: 4 G/DL (ref 3.5–5)
ALBUMIN/GLOB SERPL: 1.1 {RATIO} (ref 1.1–2.2)
ALP SERPL-CCNC: 67 U/L (ref 45–117)
ALT SERPL-CCNC: 23 U/L (ref 12–78)
ANION GAP SERPL CALC-SCNC: 6 MMOL/L (ref 5–15)
AST SERPL-CCNC: 21 U/L (ref 15–37)
BASOPHILS # BLD: 0 K/UL (ref 0–0.1)
BASOPHILS NFR BLD: 0 % (ref 0–1)
BILIRUB SERPL-MCNC: 1.4 MG/DL (ref 0.2–1)
BNP SERPL-MCNC: 2033 PG/ML
BUN SERPL-MCNC: 22 MG/DL (ref 6–20)
BUN/CREAT SERPL: 13 (ref 12–20)
CALCIUM SERPL-MCNC: 9.2 MG/DL (ref 8.5–10.1)
CHLORIDE SERPL-SCNC: 102 MMOL/L (ref 97–108)
CO2 SERPL-SCNC: 27 MMOL/L (ref 21–32)
COVID-19 RAPID TEST, COVR: DETECTED
CREAT SERPL-MCNC: 1.69 MG/DL (ref 0.7–1.3)
DIFFERENTIAL METHOD BLD: ABNORMAL
EOSINOPHIL # BLD: 0 K/UL (ref 0–0.4)
EOSINOPHIL NFR BLD: 0 % (ref 0–7)
ERYTHROCYTE [DISTWIDTH] IN BLOOD BY AUTOMATED COUNT: 12.5 % (ref 11.5–14.5)
GLOBULIN SER CALC-MCNC: 3.8 G/DL (ref 2–4)
GLUCOSE SERPL-MCNC: 115 MG/DL (ref 65–100)
HCT VFR BLD AUTO: 45 % (ref 36.6–50.3)
HGB BLD-MCNC: 14.8 G/DL (ref 12.1–17)
IMM GRANULOCYTES # BLD AUTO: 0 K/UL (ref 0–0.04)
IMM GRANULOCYTES NFR BLD AUTO: 0 % (ref 0–0.5)
LYMPHOCYTES # BLD: 1.6 K/UL (ref 0.8–3.5)
LYMPHOCYTES NFR BLD: 17 % (ref 12–49)
MAGNESIUM SERPL-MCNC: 2 MG/DL (ref 1.6–2.4)
MCH RBC QN AUTO: 30.3 PG (ref 26–34)
MCHC RBC AUTO-ENTMCNC: 32.9 G/DL (ref 30–36.5)
MCV RBC AUTO: 92.2 FL (ref 80–99)
MONOCYTES # BLD: 1.1 K/UL (ref 0–1)
MONOCYTES NFR BLD: 12 % (ref 5–13)
NEUTS SEG # BLD: 6.4 K/UL (ref 1.8–8)
NEUTS SEG NFR BLD: 71 % (ref 32–75)
NRBC # BLD: 0 K/UL (ref 0–0.01)
NRBC BLD-RTO: 0 PER 100 WBC
PHOSPHATE SERPL-MCNC: 2.8 MG/DL (ref 2.6–4.7)
PLATELET # BLD AUTO: 157 K/UL (ref 150–400)
PMV BLD AUTO: 11.5 FL (ref 8.9–12.9)
POTASSIUM SERPL-SCNC: 4.6 MMOL/L (ref 3.5–5.1)
PROT SERPL-MCNC: 7.8 G/DL (ref 6.4–8.2)
RBC # BLD AUTO: 4.88 M/UL (ref 4.1–5.7)
SODIUM SERPL-SCNC: 135 MMOL/L (ref 136–145)
SOURCE, COVRS: ABNORMAL
TROPONIN-HIGH SENSITIVITY: 5 NG/L (ref 0–76)
WBC # BLD AUTO: 9.1 K/UL (ref 4.1–11.1)

## 2022-12-23 PROCEDURE — 87635 SARS-COV-2 COVID-19 AMP PRB: CPT

## 2022-12-23 PROCEDURE — 96374 THER/PROPH/DIAG INJ IV PUSH: CPT

## 2022-12-23 PROCEDURE — 85025 COMPLETE CBC W/AUTO DIFF WBC: CPT

## 2022-12-23 PROCEDURE — 36415 COLL VENOUS BLD VENIPUNCTURE: CPT

## 2022-12-23 PROCEDURE — 83880 ASSAY OF NATRIURETIC PEPTIDE: CPT

## 2022-12-23 PROCEDURE — 74011000250 HC RX REV CODE- 250: Performed by: STUDENT IN AN ORGANIZED HEALTH CARE EDUCATION/TRAINING PROGRAM

## 2022-12-23 PROCEDURE — 96361 HYDRATE IV INFUSION ADD-ON: CPT

## 2022-12-23 PROCEDURE — 83735 ASSAY OF MAGNESIUM: CPT

## 2022-12-23 PROCEDURE — 96376 TX/PRO/DX INJ SAME DRUG ADON: CPT

## 2022-12-23 PROCEDURE — 84100 ASSAY OF PHOSPHORUS: CPT

## 2022-12-23 PROCEDURE — 74011250636 HC RX REV CODE- 250/636: Performed by: STUDENT IN AN ORGANIZED HEALTH CARE EDUCATION/TRAINING PROGRAM

## 2022-12-23 PROCEDURE — 93005 ELECTROCARDIOGRAM TRACING: CPT

## 2022-12-23 PROCEDURE — 80053 COMPREHEN METABOLIC PANEL: CPT

## 2022-12-23 PROCEDURE — 71046 X-RAY EXAM CHEST 2 VIEWS: CPT

## 2022-12-23 PROCEDURE — 84484 ASSAY OF TROPONIN QUANT: CPT

## 2022-12-23 PROCEDURE — 99285 EMERGENCY DEPT VISIT HI MDM: CPT

## 2022-12-23 RX ORDER — METOPROLOL TARTRATE 5 MG/5ML
5 INJECTION INTRAVENOUS ONCE
Status: DISCONTINUED | OUTPATIENT
Start: 2022-12-23 | End: 2022-12-23

## 2022-12-23 RX ORDER — METOPROLOL TARTRATE 5 MG/5ML
2.5 INJECTION INTRAVENOUS ONCE
Status: COMPLETED | OUTPATIENT
Start: 2022-12-23 | End: 2022-12-23

## 2022-12-23 RX ORDER — METOPROLOL SUCCINATE 25 MG/1
50 TABLET, EXTENDED RELEASE ORAL DAILY
Qty: 90 TABLET | Refills: 2 | Status: SHIPPED | OUTPATIENT
Start: 2022-12-23

## 2022-12-23 RX ORDER — METOPROLOL TARTRATE 5 MG/5ML
5 INJECTION INTRAVENOUS ONCE
Status: COMPLETED | OUTPATIENT
Start: 2022-12-23 | End: 2022-12-23

## 2022-12-23 RX ADMIN — METOPROLOL TARTRATE 2.5 MG: 5 INJECTION, SOLUTION INTRAVENOUS at 17:04

## 2022-12-23 RX ADMIN — SODIUM CHLORIDE 1000 ML: 9 INJECTION, SOLUTION INTRAVENOUS at 16:18

## 2022-12-23 RX ADMIN — METOPROLOL TARTRATE 5 MG: 5 INJECTION, SOLUTION INTRAVENOUS at 18:16

## 2022-12-23 NOTE — ED NOTES
Went in to medicate the patient again for his a fib and when I went in he is mad again that we are \"not attending to his needs, nobody has done anything for me and I am cold\"  I explained to him again that I have medicated him once, we have started iv fluids and he has had labs and an ekg. I explained to him that we are treating his afib and that if he needs a blanket he needs to just let us know and we will be glad to get one for him.

## 2022-12-23 NOTE — ED NOTES
Pt removed all his monitors because they are driving him nuts. I explained to him that they are going off due to his heart rate. He states we are not doing anything for him and nobody is tending to me so I doesn't even know why I am here. Explained tot he patient that I just left his room so I dont understand why he feels we have not been doing anything for him.

## 2022-12-23 NOTE — ED NOTES
Pt states he started feeling sick last night and took a home covid test which was negative. [facility] after he took another test at home that came back positive. He states he doesn't feel bad but he wanted to get medications for the virus. He states [facility] because they notice his rapid heart rate.

## 2022-12-23 NOTE — ED PROVIDER NOTES
EMERGENCY DEPARTMENT HISTORY AND PHYSICAL EXAM      Date: 12/23/2022  Patient Name: Ninfa Maldonado    History of Presenting Illness     Chief Complaint   Patient presents with    Irregular Heart Beat     Afib RVR    Positive For Covid-19     As of today, sxs started yesterday     History Provided By: Patient    HPI: Ninfa Maldonado, 78 y.o. male with a past medical history significant for medical problems as stated below presents to the ED with cc of COVID and wanting paxlovid. He reports that he started having nonproductive cough last night and testes positive for COVID at home. He went to an urgent care to get Paxlovid and they sent him to the emergency deparment when they found him to be in atrial fibrillation with RVR. He denies any chest pain ro palpitations. He has not hd shortness of breath or fevers, but has had chills. He thinks he is always in atrial fibrillation. There are no other associated symptoms. No other exacerbating or ameliorating factors. PCP: Geremias Mcnulty, DO    No current facility-administered medications on file prior to encounter. Current Outpatient Medications on File Prior to Encounter   Medication Sig Dispense Refill    rosuvastatin (CRESTOR) 20 mg tablet Take 1 tablet by mouth nightly 90 Tablet 3    lisinopriL (PRINIVIL, ZESTRIL) 2.5 mg tablet Take 1 Tablet by mouth in the morning. **FOLLOW UP WITH DR FREY FOR FURTHER REFILLS** 30 Tablet 0    [DISCONTINUED] metoprolol succinate (TOPROL-XL) 25 mg XL tablet Take 1 tablet by mouth once daily 90 Tablet 2    apixaban (ELIQUIS) 5 mg tablet Take 1 Tablet by mouth two (2) times a day.  Replaces aspirin on 1/10/2022 180 Tablet 4       Past History     Past Medical History:  Past Medical History:   Diagnosis Date    Atrial fibrillation ([address]     Cancer ([address]     Congestive heart failure ([address]     Hypercholesterolemia     Hypertension        Past Surgical History:  Past Surgical History:   Procedure Laterality Date HX APPENDECTOMY  2011    HX HERNIA REPAIR      HX TOTAL COLECTOMY  09/2013    cancer       Family History:  Family History   Problem Relation Age of Onset    Cancer Mother     Coronary Art Dis Father     Cancer Sister     Cancer Brother     Emphysema Brother        Social History:  Social History     Tobacco Use    Smoking status: Never    Smokeless tobacco: Never   Substance Use Topics    Alcohol use: Not Currently     Comment: rarely    Drug use: Never       Allergies:  No Known Allergies      Review of Systems   Review of Systems   Constitutional:  Positive for chills. Negative for appetite change and fever. HENT:  Negative for sore throat. Eyes:  Negative for visual disturbance. Respiratory:  Positive for cough. Negative for shortness of breath. Cardiovascular:  Negative for chest pain. Gastrointestinal:  Negative for abdominal pain, diarrhea, nausea and vomiting. Genitourinary:  Negative for difficulty urinating. Musculoskeletal:  Negative for myalgias. Skin:  Negative for color change. Neurological:  Negative for dizziness and headaches. Psychiatric/Behavioral:  Negative for agitation. Physical Exam   Physical Exam  Constitutional:       General: He is not in acute distress. Appearance: Normal appearance. He is not toxic-appearing. HENT:      Head: Normocephalic and atraumatic. Mouth/Throat:      Mouth: Mucous membranes are dry. Eyes:      Conjunctiva/sclera: Conjunctivae normal.      Pupils: Pupils are equal, round, and reactive to light. Cardiovascular:      Rate and Rhythm: Tachycardia present. Rhythm irregular. Pulmonary:      Effort: Pulmonary effort is normal. No respiratory distress. Breath sounds: Normal breath sounds. No wheezing, rhonchi or rales. Abdominal:      General: Abdomen is flat. There is no distension. Palpations: Abdomen is soft. Tenderness: There is no guarding or rebound. Musculoskeletal:         General: No swelling.  Normal range of motion. Cervical back: Normal range of motion. No rigidity. Right lower leg: No edema. Left lower leg: No edema. Skin:     General: Skin is warm and dry. Capillary Refill: Capillary refill takes less than 2 seconds. Neurological:      General: No focal deficit present. Mental Status: He is alert and oriented to person, place, and time. Psychiatric:         Mood and Affect: Mood normal.       Diagnostic Study Results     Labs -     Recent Results (from the past 24 hour(s))   EKG, 12 LEAD, INITIAL    Collection Time: 12/23/22  3:18 PM   Result Value Ref Range    Ventricular Rate 144 BPM    Atrial Rate 441 BPM    QRS Duration 78 ms    Q-T Interval 294 ms    QTC Calculation (Bezet) 455 ms    Calculated R Axis 31 degrees    Calculated T Axis -96 degrees    Diagnosis       Atrial fibrillation with rapid ventricular response  Nonspecific ST and T wave abnormality  When compared with ECG of 23-JUL-2019 09:28,  Atrial fibrillation has replaced Sinus rhythm  Vent. rate has increased  BPM  Non-specific change in ST segment in Inferior leads  Non-specific change in ST segment in Anterior leads  Nonspecific T wave abnormality now evident in Inferior leads  Inverted T waves have replaced nonspecific T wave abnormality in Lateral   leads     CBC WITH AUTOMATED DIFF    Collection Time: 12/23/22  3:22 PM   Result Value Ref Range    WBC 9.1 4.1 - 11.1 K/uL    RBC 4.88 4.10 - 5.70 M/uL    HGB 14.8 12.1 - 17.0 g/dL    HCT 45.0 36.6 - 50.3 %    MCV 92.2 80.0 - 99.0 FL    MCH 30.3 26.0 - 34.0 PG    MCHC 32.9 30.0 - 36.5 g/dL    RDW 12.5 11.5 - 14.5 %    PLATELET 097 292 - 718 K/uL    MPV 11.5 8.9 - 12.9 FL    NRBC 0.0 0  WBC    ABSOLUTE NRBC 0.00 0.00 - 0.01 K/uL    NEUTROPHILS 71 32 - 75 %    LYMPHOCYTES 17 12 - 49 %    MONOCYTES 12 5 - 13 %    EOSINOPHILS 0 0 - 7 %    BASOPHILS 0 0 - 1 %    IMMATURE GRANULOCYTES 0 0.0 - 0.5 %    ABS. NEUTROPHILS 6.4 1.8 - 8.0 K/UL    ABS.  LYMPHOCYTES 1.6 0.8 - 3.5 K/UL    ABS. MONOCYTES 1.1 (H) 0.0 - 1.0 K/UL    ABS. EOSINOPHILS 0.0 0.0 - 0.4 K/UL    ABS. BASOPHILS 0.0 0.0 - 0.1 K/UL    ABS. IMM. GRANS. 0.0 0.00 - 0.04 K/UL    DF AUTOMATED     METABOLIC PANEL, COMPREHENSIVE    Collection Time: 12/23/22  3:22 PM   Result Value Ref Range    Sodium 135 (L) 136 - 145 mmol/L    Potassium 4.6 3.5 - 5.1 mmol/L    Chloride 102 97 - 108 mmol/L    CO2 27 21 - 32 mmol/L    Anion gap 6 5 - 15 mmol/L    Glucose 115 (H) 65 - 100 mg/dL    BUN 22 (H) 6 - 20 MG/DL    Creatinine 1.69 (H) 0.70 - 1.30 MG/DL    BUN/Creatinine ratio 13 12 - 20      eGFR 44 (L) >60 ml/min/1.73m2    Calcium 9.2 8.5 - 10.1 MG/DL    Bilirubin, total 1.4 (H) 0.2 - 1.0 MG/DL    ALT (SGPT) 23 12 - 78 U/L    AST (SGOT) 21 15 - 37 U/L    Alk. phosphatase 67 45 - 117 U/L    Protein, total 7.8 6.4 - 8.2 g/dL    Albumin 4.0 3.5 - 5.0 g/dL    Globulin 3.8 2.0 - 4.0 g/dL    A-G Ratio 1.1 1.1 - 2.2     NT-PRO BNP    Collection Time: 12/23/22  3:22 PM   Result Value Ref Range    NT pro-BNP 2,033 (H) <125 PG/ML   TROPONIN-HIGH SENSITIVITY    Collection Time: 12/23/22  3:22 PM   Result Value Ref Range    Troponin-High Sensitivity 5 0 - 76 ng/L   MAGNESIUM    Collection Time: 12/23/22  3:22 PM   Result Value Ref Range    Magnesium 2.0 1.6 - 2.4 mg/dL   PHOSPHORUS    Collection Time: 12/23/22  3:22 PM   Result Value Ref Range    Phosphorus 2.8 2.6 - 4.7 MG/DL   COVID-19 RAPID TEST    Collection Time: 12/23/22  6:20 PM   Result Value Ref Range    Specimen source Nasopharyngeal      COVID-19 rapid test Detected (AA) NOTD         Radiologic Studies -   XR CHEST PA LAT   Final Result   1. Lungs are deeply aerated with mild flattening of the diaphragms. 2. Small rounded opacity at the right lung apex could represent a pulmonary   nodule but could represent artifact.  Recommend follow-up PA chest radiograph and   apical lordotic radiograph after removal of patient's clothing and overlying   materials        CT Results  (Last 48 hours)      None          CXR Results  (Last 48 hours)                 12/23/22 1543  XR CHEST PA LAT Final result    Impression:  1. Lungs are deeply aerated with mild flattening of the diaphragms. 2. Small rounded opacity at the right lung apex could represent a pulmonary   nodule but could represent artifact. Recommend follow-up PA chest radiograph and   apical lordotic radiograph after removal of patient's clothing and overlying   materials       Narrative:  EXAM: XR CHEST PA LAT       INDICATION: Chest pain       COMPARISON: None       TECHNIQUE: PA and lateral chest views       FINDINGS: The cardiac size is within normal limits. The pulmonary vasculature is   within normal limits. The lungs are deeply aerated and there is mild flattening of the diaphragms. There is no pneumonia or pulmonary edema. There is a small rounded opacity measuring 7 mm at the right lung apex. This   could be a small pulmonary nodule. The margins are sharp. This could be   artifactual as there are some external densities projecting over the patient in   this area. Follow-up PA film with removal of patient's clothing and overlying   material is suggested. There is no adenopathy or pleural effusions. Osseous structures are unremarkable. Medical Decision Making   I am the first provider for this patient. I reviewed the vital signs, available nursing notes, past medical history, past surgical history, family history and social history. Vital Signs-Reviewed the patient's vital signs.   Patient Vitals for the past 12 hrs:   Temp Pulse Resp BP SpO2   12/23/22 1917 -- 99 -- 113/84 --   12/23/22 1842 -- (!) 106 20 -- --   12/23/22 1837 -- (!) 128 21 -- --   12/23/22 1824 -- (!) 110 21 -- --   12/23/22 1730 -- (!) 119 24 113/63 95 %   12/23/22 1726 -- (!) 141 22 107/82 98 %   12/23/22 1704 -- (!) 150 -- 107/88 --   12/23/22 1700 -- (!) 154 19 90/76 98 %   12/23/22 1630 -- (!) 138 15 101/76 96 %   12/23/22 1615 -- (!) 167 23 106/87 97 %   12/23/22 1513 99.3 °F (37.4 °C) (!) 137 20 116/76 97 %       Records Reviewed: Nursing records and medical records reviewed    EKG as interpreted by me or with irregular rhythm, heart rate 144, normal axis, unable to determine intervals, nonspecific ST changes    Medical Decision Making  Pt presents with atrial fibrillation with RVR. Patient assymptomatic from this. Suspect he is tachycardic due to COVID infection. Doubt ACS with no chest pain and negative troponin. Pt is slightly dehydrated which I suspect is contributing - gave 1L IVF. BNP elevated, but suspect this is from afib w RVR rather than heart failure - his EF is 50-55% and no signs of volume overload. Pt slightly hypotensive so will try giving IV dose of 2.5mg metoprolol to control rate. Will speak to cardiologist and have a discussion with pharmacist if Paxlovid safe with apixaban. Patient nontoxic appearing. From a COVID standpoint no hypoxia - doubt pneumonia and no infectious findings seen on CXR. Will re-evaluate following IV metoprolol administation. Amount and/or Complexity of Data Reviewed  Labs: ordered. Radiology: ordered. ECG/medicine tests: ordered and independent interpretation performed. Discussion of management or test interpretation with external provider(s): Cardiologist, pharmacist        ED Course:   Initial assessment performed. The patients presenting problems have been discussed, and they are in agreement with the care plan formulated and outlined with them. I have encouraged them to ask questions as they arise throughout their visit. ED Course as of 12/23/22 1909   Fri Dec 23, 2022   1653 Patient's heart rate has been significant elevated with A. fib with RVR to heart rate of 140. BP with systolic BPs in the 83C. We will give 2.5 mg of IV metoprolol as he takes PO metoprolol at home.   Will discuss patient's management with his cardiology group. [WB]   4774 Patient's heart rate unfortunately has been persisting in the 140s and rising up to 170s. Will repeat dose of 5 mg IV metoprolol. He has no hypotension at this time. Patient very upset because he feels that he is not getting care in the emergency department, but we have been attempting to monitor him continuously. He keeps removing his cardiac monitor and his pulse ox. Have reapplied these to the patient. I discussed with the patient that we would like to bring his heart rate down and that I do not think it safe to go home while it is significantly elevated. He told me that he wanted to go home no matter what with Paxlovid from [facility]. I told him that his heart rate is too high and I would admit him to the hospital under usual circumstances whether or not he had COVID and the inpatient team would decide on further treatment if that were the case, but he states that he would like to leave no matter what. We will again try to give 5 mg IV metoprolol to bring his heart rate down and are awaiting discussion with [facility]. [WB]   [address] Have spoken to Dr. Basilio Santoyo and he recommends doubling dose of metoprolol. Patient is COVID positive. HR has improved after second dose of IV metoprolol. Now at a level where I feel more comfortable discharging. Will increase metoprolol to 50mg succinate daily. Discussed strict return precautions. [WB]   1908 Patient's apixaban interacts with paxlovid. Do not think it is safe to decrease dose of Eliquis for Paxlovid administration at this time and patient agrees. Stable for discharge.   Discussed customary return precautions. [WB]      ED Course User Index  [WB] Titus Marques MD       Medications Administered       metoprolol (LOPRESSOR) injection 2.5 mg       Admin Date  12/23/2022 Action  Given Dose  2.5 mg Route  IntraVENous Administered By  Dallas Garcia RN              metoprolol (LOPRESSOR) injection 5 mg       Admin Date  12/23/2022 Action  Given Dose  5 mg Route  IntraVENous Administered By  Laura Alonzo RN              sodium chloride 0.9 % bolus infusion 1,000 mL       Admin Date  12/23/2022 Action  New Bag Dose  1,000 mL Route  IntraVENous Administered By  Laura Alonzo RN                  Critical Care:  I have spent 32 minutes of critical care time in evaluating and treating this patient. This includes time spent at bedside, time with family and decision makers, documentation, review of labs and imaging, and/or consultation with specialists. It does not include time spent on separately billed procedures. This patient presents with a critical illness or injury that acutely impairs one or more vital organ systems such that there is a high probability of imminent or life threatening deterioration in the patient's condition. This case involved decision making of high complexity to assess, manipulate, and support vital organ system failure and/or to prevent further life threatening deterioration of the patient's condition. Failure to initiate these interventions on an urgent basis would likely result in sudden, clinically significant or life threatening deterioration in the patient's condition. Abnormal findings supporting critical care: atrial fibrillation with RVR  Interventions to support critical care: Repeat doses of IV metoprolol, discussion with cardiology, monitoring on telemetry  Failure to intervene may result in: cardiovascular failure, death    Disposition:  Home    DISCHARGE PLAN:  1. Discharge Medication List as of 12/23/2022  7:10 PM        CONTINUE these medications which have CHANGED    Details   metoprolol succinate (TOPROL-XL) 25 mg XL tablet Take 2 Tablets by mouth daily. , Normal, Disp-90 Tablet, R-2           CONTINUE these medications which have NOT CHANGED    Details   rosuvastatin (CRESTOR) 20 mg tablet Take 1 tablet by mouth nightly, Normal, Disp-90 Tablet, R-3      lisinopriL (PRINIVIL, ZESTRIL) 2.5 mg tablet Take 1 Tablet by mouth in the morning. **FOLLOW UP WITH DR SANTOYO FOR FURTHER REFILLS**, Normal, Disp-30 Tablet, R-0      apixaban (ELIQUIS) 5 mg tablet Take 1 Tablet by mouth two (2) times a day. Replaces aspirin on 1/10/2022, Normal, Disp-180 Tablet, R-4           2. Follow-up Information       Follow up With Specialties Details Why Sonia Boss, Tre Molina DO Internal Medicine Physician Schedule an appointment as soon as possible for a visit   [address]  Schedule an appointment as soon as possible for a visit  [address] [facility]    Miguel Angel Hart  [facility] Vascular Surgery, Cardiovascular Disease Physician, Interventional Cardiology Physician Schedule an appointment as soon as possible for a visit   [address]  [address] [address]  [phone]            3. Return to ED if worse     Diagnosis     Clinical Impression:   1. Atrial fibrillation, unspecified type ([address]    2. Dilated cardiomyopathy (HCC)    3. Paroxysmal atrial fibrillation (HCC)    4. COVID        Attestations:    Denver Haas MD    Please note that this dictation was completed with [employer], the computer voice recognition software. Quite often unanticipated grammatical, syntax, homophones, and other interpretive errors are inadvertently transcribed by the computer software. Please disregard these errors. Please excuse any errors that have escaped final proofreading. Thank you.

## 2022-12-24 LAB
ATRIAL RATE: 441 BPM
CALCULATED R AXIS, ECG10: 31 DEGREES
CALCULATED T AXIS, ECG11: -96 DEGREES
DIAGNOSIS, 93000: NORMAL
Q-T INTERVAL, ECG07: 294 MS
QRS DURATION, ECG06: 78 MS
QTC CALCULATION (BEZET), ECG08: 455 MS
VENTRICULAR RATE, ECG03: 144 BPM

## 2022-12-24 NOTE — ED NOTES
Patient discharged by Jerald HANNON  - pt sent to the front lobby, with strong and steady gait, no acute distress noted at time of discharge - Discharge information / home RX / and reasons to return to the ED were reviewed by the ED provider.

## 2023-01-26 ENCOUNTER — OFFICE VISIT (OUTPATIENT)
Dept: CARDIOTHORACIC SURGERY | Age: 68
End: 2023-01-26
Payer: MEDICARE

## 2023-01-26 VITALS
BODY MASS INDEX: 27.71 KG/M2 | RESPIRATION RATE: 14 BRPM | DIASTOLIC BLOOD PRESSURE: 72 MMHG | SYSTOLIC BLOOD PRESSURE: 128 MMHG | WEIGHT: 182.8 LBS | OXYGEN SATURATION: 92 % | HEART RATE: 93 BPM | HEIGHT: 68 IN | TEMPERATURE: 97.9 F

## 2023-01-26 DIAGNOSIS — I25.118 CORONARY ARTERY DISEASE OF NATIVE ARTERY OF NATIVE HEART WITH STABLE ANGINA PECTORIS (HCC): Primary | ICD-10-CM

## 2023-01-26 NOTE — PROGRESS NOTES
Chief Complaint   Patient presents with    Referral / Consult     A-fib     1. Have you been to the ER, urgent care clinic since your last visit? Hospitalized since your last visit? No    2. Have you seen or consulted any other health care providers outside of the [address] [place] [address] since your last visit? Include any pap smears or colon screening.  No    Visit Vitals  /72 (BP 1 Location: Left upper arm, BP Patient Position: Sitting, BP Cuff Size: Large adult)   Pulse 93   Temp 97.9 °F (36.6 °C)   Resp 14   Ht 5' 8\" (1.727 m)   Wt 182 lb 12.8 oz (82.9 kg)   SpO2 92%   BMI 27.79 kg/m²

## 2023-01-28 NOTE — PROGRESS NOTES
Cardiothoracic Surgery Consult    Subjective:      Shabana Higgins is a 78 y.o. male who was referred for cardiac evaluation. He had a recent ED visit related to rapid AFib and was subsequently seen by Dr. Bran Brown who has referred him for evaluation for hybrid ablation and ENRIQUETA clip. He has not had a prior ablation. He did have a hx of a severely reduced EF in May 2019 according to TTE that recovered by sept 2019 - this is the last known TTE we have of his LV function. He is currently in no distress in the office. Past Medical History:   Diagnosis Date    Atrial fibrillation ([address]     Cancer ([address]     Congestive heart failure ([address]     Hypercholesterolemia     Hypertension      Past Surgical History:   Procedure Laterality Date    HX APPENDECTOMY  2011    HX HERNIA REPAIR      HX TOTAL COLECTOMY  09/2013    cancer      Social History     Tobacco Use    Smoking status: Never    Smokeless tobacco: Never   Substance Use Topics    Alcohol use: Not Currently     Comment: rarely      Family History   Problem Relation Age of Onset    Cancer Mother     Coronary Art Dis Father     Cancer Sister     Cancer Brother     Emphysema Brother      Current Outpatient Medications   Medication Sig    metoprolol succinate (TOPROL-XL) 25 mg XL tablet Take 2 Tablets by mouth daily. rosuvastatin (CRESTOR) 20 mg tablet Take 1 tablet by mouth nightly    lisinopriL (PRINIVIL, ZESTRIL) 2.5 mg tablet Take 1 Tablet by mouth in the morning. **FOLLOW UP WITH DR ANNA FOR FURTHER REFILLS**    apixaban (ELIQUIS) 5 mg tablet Take 1 Tablet by mouth two (2) times a day. Replaces aspirin on 1/10/2022     No current facility-administered medications for this visit. No Known Allergies    Review of Systems:   A comprehensive review of systems was negative except for that written in the History of Present Illness.      Objective:     @IPVITALS[8:@    Physical Exam:    Visit Vitals  /72 (BP 1 Location: Left upper arm, BP Patient Position: Sitting, BP Cuff Size: Large adult)   Pulse 93   Temp 97.9 °F (36.6 °C)   Resp 14   Ht 5' 8\" (1.727 m)   Wt 182 lb 12.8 oz (82.9 kg)   SpO2 92%   BMI 27.79 kg/m²     General:  Alert, cooperative, no distress, appears stated age. Head:  Normocephalic, without obvious abnormality, atraumatic. Eyes:  Conjunctivae/corneas clear. PERRL, EOMs intact. Nose: Nares normal. Septum midline. Mucosa normal. No drainage or sinus tenderness. Throat: Lips, mucosa, and tongue normal. Teeth and gums normal.   Neck: Supple, symmetrical, trachea midline, no adenopathy, thyroid: no enlargement/tenderness/nodules, no carotid bruit and no JVD. Back:   Symmetric, no curvature. ROM normal. No CVA tenderness. Lungs:   Clear to auscultation bilaterally. Chest wall:  No tenderness or deformity. Heart:  Regular rate and rhythm, S1, S2 normal, no murmur, click, rub or gallop. Abdomen:   Soft, non-tender. Bowel sounds normal. No masses,  No organomegaly. Extremities: Extremities normal, atraumatic, no cyanosis or edema. Pulses: 2+ and symmetric all extremities. Skin: Skin color, texture, turgor normal. No rashes or lesions       Neurologic: CNII-XII intact. Normal strength, sensation and reflexes throughout. LABS:  Cr 1.69; proBNP 2000    Cardiac Testing:  TTE pending      Assessment:     Chronic atrial fibrillation    Plan:     Mr. Ale Nagel is a good candidate for hybrid ablation and ENRIQUETA ligation via VATS. I would like to see the result of an updated TTE - his proBNP >2000 is concenring he may have had some recent reduction in is EF again. We discusssed the risks and benefits of both the clip procedure and the hybrid ablation, and he agreed to proceed. We have a tentative date scheduled for February 22.      Signed By: Cj García MD     January 28, 2023       Co

## 2023-02-01 DIAGNOSIS — I48.91 ATRIAL FIBRILLATION, UNSPECIFIED TYPE (HCC): Primary | ICD-10-CM

## 2023-02-08 ENCOUNTER — HOSPITAL ENCOUNTER (OUTPATIENT)
Dept: NON INVASIVE DIAGNOSTICS | Age: 68
Discharge: HOME | End: 2023-02-08
Attending: NURSE PRACTITIONER
Payer: MEDICARE

## 2023-02-08 VITALS
BODY MASS INDEX: 27.7 KG/M2 | WEIGHT: 182.76 LBS | HEIGHT: 68 IN | SYSTOLIC BLOOD PRESSURE: 106 MMHG | DIASTOLIC BLOOD PRESSURE: 69 MMHG

## 2023-02-08 DIAGNOSIS — I48.91 ATRIAL FIBRILLATION, UNSPECIFIED TYPE (HCC): ICD-10-CM

## 2023-02-08 LAB
ECHO AO ROOT DIAM: 3.4 CM
ECHO AO ROOT INDEX: 1.73 CM/M2
ECHO EST RA PRESSURE: 3 MMHG
ECHO LA DIAMETER INDEX: 2.03 CM/M2
ECHO LA DIAMETER: 4 CM
ECHO LA TO AORTIC ROOT RATIO: 1.18
ECHO LV E' LATERAL VELOCITY: 15 CM/S
ECHO LV E' SEPTAL VELOCITY: 10 CM/S
ECHO LV FRACTIONAL SHORTENING: 36 % (ref 28–44)
ECHO LV INTERNAL DIMENSION DIASTOLE INDEX: 2.69 CM/M2
ECHO LV INTERNAL DIMENSION DIASTOLIC: 5.3 CM (ref 4.2–5.9)
ECHO LV INTERNAL DIMENSION SYSTOLIC INDEX: 1.73 CM/M2
ECHO LV INTERNAL DIMENSION SYSTOLIC: 3.4 CM
ECHO LV IVSD: 1 CM (ref 0.6–1)
ECHO LV MASS 2D: 138.3 G (ref 88–224)
ECHO LV MASS INDEX 2D: 70.2 G/M2 (ref 49–115)
ECHO LV POSTERIOR WALL DIASTOLIC: 0.5 CM (ref 0.6–1)
ECHO LV RELATIVE WALL THICKNESS RATIO: 0.19
ECHO LVOT AREA: 2.8 CM2
ECHO LVOT DIAM: 1.9 CM
ECHO LVOT PEAK GRADIENT: 1 MMHG
ECHO LVOT PEAK VELOCITY: 0.6 M/S
ECHO MV E VELOCITY: 0.81 M/S
ECHO MV E/E' LATERAL: 5.4
ECHO MV E/E' RATIO (AVERAGED): 6.75
ECHO MV E/E' SEPTAL: 8.1
ECHO PV MAX VELOCITY: 0.8 M/S
ECHO PV PEAK GRADIENT: 2 MMHG
ECHO RIGHT VENTRICULAR SYSTOLIC PRESSURE (RVSP): 8 MMHG
ECHO RV FREE WALL PEAK S': 14 CM/S
ECHO RV TAPSE: 2.3 CM (ref 1.7–?)
ECHO TV REGURGITANT MAX VELOCITY: 1.17 M/S
ECHO TV REGURGITANT PEAK GRADIENT: 6 MMHG

## 2023-02-08 PROCEDURE — 93306 TTE W/DOPPLER COMPLETE: CPT

## 2023-02-08 PROCEDURE — 93306 TTE W/DOPPLER COMPLETE: CPT | Performed by: SPECIALIST

## 2023-02-15 ENCOUNTER — HOSPITAL ENCOUNTER (OUTPATIENT)
Dept: GENERAL RADIOLOGY | Age: 68
Discharge: HOME | End: 2023-02-15
Attending: NURSE PRACTITIONER
Payer: MEDICARE

## 2023-02-15 ENCOUNTER — HOSPITAL ENCOUNTER (OUTPATIENT)
Dept: PREADMISSION TESTING | Age: 68
Discharge: HOME | End: 2023-02-15
Attending: THORACIC SURGERY (CARDIOTHORACIC VASCULAR SURGERY)
Payer: MEDICARE

## 2023-02-15 VITALS
TEMPERATURE: 98.2 F | SYSTOLIC BLOOD PRESSURE: 110 MMHG | WEIGHT: 182.98 LBS | HEIGHT: 68 IN | OXYGEN SATURATION: 99 % | DIASTOLIC BLOOD PRESSURE: 66 MMHG | BODY MASS INDEX: 27.73 KG/M2 | HEART RATE: 83 BPM | RESPIRATION RATE: 18 BRPM

## 2023-02-15 LAB
ABO + RH BLD: NORMAL
ALBUMIN SERPL-MCNC: 4 G/DL (ref 3.5–5)
ALBUMIN/GLOB SERPL: 1 (ref 1.1–2.2)
ALP SERPL-CCNC: 72 U/L (ref 45–117)
ALT SERPL-CCNC: 30 U/L (ref 12–78)
ANION GAP SERPL CALC-SCNC: 8 MMOL/L (ref 5–15)
APPEARANCE UR: CLEAR
APTT PPP: 24.8 SEC (ref 22.1–31)
ARTERIAL PATENCY WRIST A: YES
AST SERPL-CCNC: 22 U/L (ref 15–37)
BACTERIA URNS QL MICRO: NEGATIVE /HPF
BASE DEFICIT BLDA-SCNC: 0.5 MMOL/L
BASOPHILS # BLD: 0 K/UL (ref 0–0.1)
BASOPHILS NFR BLD: 1 % (ref 0–1)
BDY SITE: ABNORMAL
BILIRUB SERPL-MCNC: 0.7 MG/DL (ref 0.2–1)
BILIRUB UR QL: NEGATIVE
BLOOD GROUP ANTIBODIES SERPL: NORMAL
BNP SERPL-MCNC: 905 PG/ML
BUN SERPL-MCNC: 21 MG/DL (ref 6–20)
BUN/CREAT SERPL: 15 (ref 12–20)
CALCIUM SERPL-MCNC: 9.5 MG/DL (ref 8.5–10.1)
CHLORIDE SERPL-SCNC: 104 MMOL/L (ref 97–108)
CO2 SERPL-SCNC: 27 MMOL/L (ref 21–32)
COLOR UR: NORMAL
CREAT SERPL-MCNC: 1.36 MG/DL (ref 0.7–1.3)
DIFFERENTIAL METHOD BLD: NORMAL
EOSINOPHIL # BLD: 0.1 K/UL (ref 0–0.4)
EOSINOPHIL NFR BLD: 1 % (ref 0–7)
EPITH CASTS URNS QL MICRO: NORMAL /LPF
ERYTHROCYTE [DISTWIDTH] IN BLOOD BY AUTOMATED COUNT: 12.5 % (ref 11.5–14.5)
EST. AVERAGE GLUCOSE BLD GHB EST-MCNC: 117 MG/DL
GLOBULIN SER CALC-MCNC: 3.9 G/DL (ref 2–4)
GLUCOSE SERPL-MCNC: 103 MG/DL (ref 65–100)
GLUCOSE UR STRIP.AUTO-MCNC: NEGATIVE MG/DL
HBA1C MFR BLD: 5.7 % (ref 4–5.6)
HCO3 BLDA-SCNC: 23 MMOL/L (ref 22–26)
HCT VFR BLD AUTO: 45.3 % (ref 36.6–50.3)
HGB BLD-MCNC: 14.5 G/DL (ref 12.1–17)
HGB UR QL STRIP: NEGATIVE
HISTORY CHECKED?,CKHIST: NORMAL
HYALINE CASTS URNS QL MICRO: NORMAL /LPF (ref 0–2)
IMM GRANULOCYTES # BLD AUTO: 0 K/UL (ref 0–0.04)
IMM GRANULOCYTES NFR BLD AUTO: 0 % (ref 0–0.5)
INR PPP: 1 (ref 0.9–1.1)
KETONES UR QL STRIP.AUTO: NEGATIVE MG/DL
LEUKOCYTE ESTERASE UR QL STRIP.AUTO: NEGATIVE
LYMPHOCYTES # BLD: 2.6 K/UL (ref 0.8–3.5)
LYMPHOCYTES NFR BLD: 37 % (ref 12–49)
MAGNESIUM SERPL-MCNC: 2.4 MG/DL (ref 1.6–2.4)
MCH RBC QN AUTO: 30.1 PG (ref 26–34)
MCHC RBC AUTO-ENTMCNC: 32 G/DL (ref 30–36.5)
MCV RBC AUTO: 94.2 FL (ref 80–99)
MONOCYTES # BLD: 0.6 K/UL (ref 0–1)
MONOCYTES NFR BLD: 8 % (ref 5–13)
NEUTS SEG # BLD: 3.7 K/UL (ref 1.8–8)
NEUTS SEG NFR BLD: 53 % (ref 32–75)
NITRITE UR QL STRIP.AUTO: NEGATIVE
NRBC # BLD: 0 K/UL (ref 0–0.01)
NRBC BLD-RTO: 0 PER 100 WBC
PCO2 BLDA: 36 MMHG (ref 35–45)
PH BLDA: 7.43 (ref 7.35–7.45)
PH UR STRIP: 5.5 (ref 5–8)
PLATELET # BLD AUTO: 165 K/UL (ref 150–400)
PMV BLD AUTO: 12.6 FL (ref 8.9–12.9)
PO2 BLDA: 103 MMHG (ref 80–100)
POTASSIUM SERPL-SCNC: 4.3 MMOL/L (ref 3.5–5.1)
PROT SERPL-MCNC: 7.9 G/DL (ref 6.4–8.2)
PROT UR STRIP-MCNC: NEGATIVE MG/DL
PROTHROMBIN TIME: 10.7 SEC (ref 9–11.1)
RBC # BLD AUTO: 4.81 M/UL (ref 4.1–5.7)
RBC #/AREA URNS HPF: NORMAL /HPF (ref 0–5)
SAO2 % BLD: 98 % (ref 92–97)
SAO2% DEVICE SAO2% SENSOR NAME: ABNORMAL
SODIUM SERPL-SCNC: 139 MMOL/L (ref 136–145)
SP GR UR REFRACTOMETRY: 1.01
SPECIMEN EXP DATE BLD: NORMAL
SPECIMEN SITE: ABNORMAL
THERAPEUTIC RANGE,PTTT: NORMAL SECS (ref 58–77)
TSH SERPL DL<=0.05 MIU/L-ACNC: 2.49 UIU/ML (ref 0.36–3.74)
UA: UC IF INDICATED,UAUC: NORMAL
UROBILINOGEN UR QL STRIP.AUTO: 0.2 EU/DL (ref 0.2–1)
WBC # BLD AUTO: 7.1 K/UL (ref 4.1–11.1)
WBC URNS QL MICRO: NORMAL /HPF (ref 0–4)

## 2023-02-15 PROCEDURE — 36600 WITHDRAWAL OF ARTERIAL BLOOD: CPT

## 2023-02-15 PROCEDURE — 81001 URINALYSIS AUTO W/SCOPE: CPT

## 2023-02-15 PROCEDURE — 82803 BLOOD GASES ANY COMBINATION: CPT

## 2023-02-15 PROCEDURE — 85610 PROTHROMBIN TIME: CPT

## 2023-02-15 PROCEDURE — 36415 COLL VENOUS BLD VENIPUNCTURE: CPT

## 2023-02-15 PROCEDURE — 83880 ASSAY OF NATRIURETIC PEPTIDE: CPT

## 2023-02-15 PROCEDURE — 85025 COMPLETE CBC W/AUTO DIFF WBC: CPT

## 2023-02-15 PROCEDURE — 93005 ELECTROCARDIOGRAM TRACING: CPT

## 2023-02-15 PROCEDURE — 71046 X-RAY EXAM CHEST 2 VIEWS: CPT

## 2023-02-15 PROCEDURE — 80053 COMPREHEN METABOLIC PANEL: CPT

## 2023-02-15 PROCEDURE — 83036 HEMOGLOBIN GLYCOSYLATED A1C: CPT

## 2023-02-15 PROCEDURE — 85730 THROMBOPLASTIN TIME PARTIAL: CPT

## 2023-02-15 PROCEDURE — U0005 INFEC AGEN DETEC AMPLI PROBE: HCPCS

## 2023-02-15 PROCEDURE — 83735 ASSAY OF MAGNESIUM: CPT

## 2023-02-15 PROCEDURE — 86900 BLOOD TYPING SEROLOGIC ABO: CPT

## 2023-02-15 PROCEDURE — 84443 ASSAY THYROID STIM HORMONE: CPT

## 2023-02-15 RX ORDER — MUPIROCIN 20 MG/G
OINTMENT TOPICAL 2 TIMES DAILY
Qty: 22 G | Refills: 0 | Status: SHIPPED | OUTPATIENT
Start: 2023-02-20 | End: 2023-02-22

## 2023-02-15 RX ORDER — ENOXAPARIN SODIUM 100 MG/ML
80 INJECTION SUBCUTANEOUS EVERY 12 HOURS
Qty: 1.6 ML | Refills: 0 | Status: SHIPPED | OUTPATIENT
Start: 2023-02-20 | End: 2023-02-21

## 2023-02-15 RX ORDER — CHLORHEXIDINE GLUCONATE 1.2 MG/ML
15 RINSE ORAL EVERY 12 HOURS
Qty: 60 ML | Refills: 0 | Status: SHIPPED | OUTPATIENT
Start: 2023-02-20 | End: 2023-02-22

## 2023-02-15 RX ORDER — MUPIROCIN 20 MG/G
OINTMENT TOPICAL DAILY
Qty: 22 G | Refills: 0 | Status: CANCELLED | OUTPATIENT
Start: 2023-02-15

## 2023-02-15 RX ORDER — ENOXAPARIN SODIUM 100 MG/ML
40 INJECTION SUBCUTANEOUS
Refills: 0 | Status: CANCELLED | OUTPATIENT
Start: 2023-02-15

## 2023-02-15 RX ORDER — CHLORHEXIDINE GLUCONATE 1.2 MG/ML
15 RINSE ORAL EVERY 12 HOURS
Qty: 420 ML | Refills: 0 | Status: CANCELLED | OUTPATIENT
Start: 2023-02-15 | End: 2023-03-01

## 2023-02-15 NOTE — PERIOP NOTES

## 2023-02-15 NOTE — H&P
CSS   History and Physical    Subjective:      Gloria Hanks is a 78 y.o. male who was referred for cardiac evaluation of atrial fibrillation by Dr. Jacqueline Chávez. Pt has a significant PMH of afib, chronic systolic heart failure, HLD, cancer in sigmoid colon. Pt previously reported palpitations but denied during consultation with our office. Pt only reports complaints of fatigue and weight gain. Pt denies any previous ablations. He has been on eliquis and tolerating well, denies any GIB. Pt did have to previously stop his Eliquis d/t the cost.  Pt denies any previous surgeries to his chest wall. He reports that he walks 3x a week. Mild MR and mildly dilated LA noted on the most recent TTE. Pt lives with is wife. He is retired. He gets around independently without assist devices. He is a nonsmoker, rarely drinks alcohol, and denies illicit drug use. Pt has a significant family history of his father  from an MI. Cardiac Testing  ECHO:  23  Interpretation Summary    Left Ventricle: Mildly reduced left ventricular systolic function with a visually estimated EF of 40 - 45%. Left ventricle size is normal. Normal wall thickness. Unable to assess wall motion. Mitral Valve: Mild regurgitation. Tricuspid Valve: Normal RVSP. The estimated RVSP is 8 mmHg. Left Atrium: Left atrium is mildly dilated. Right Atrium: Right atrium is mildly dilated. Aorta: Normal sized aortic root. Ao Root diameter is 3.4 cm. Echo Findings  Left Ventricle Mildly reduced left ventricular systolic function with a visually estimated EF of 40 - 45%. Left ventricle size is normal. Normal wall thickness. Unable to assess wall motion. Indeterminate diastolic function due to atrial fibrillation. Left Atrium Left atrium is mildly dilated. Right Ventricle Right ventricle size is normal. Normal systolic function. TAPSE is normal. TAPSE is 2.3 cm. Right Atrium Right atrium is mildly dilated.    Aortic Valve Valve structure is normal. No regurgitation. No significant stenosis. Mitral Valve Valve structure is normal. Mild regurgitation. No stenosis noted. Tricuspid Valve Valve structure is normal. Trace regurgitation. No stenosis noted. Normal RVSP. The estimated RVSP is 8 mmHg. Pulmonic Valve Valve structure is normal. Physiologically normal regurgitation. No stenosis noted. Aorta Normal sized aortic root. Ao Root diameter is 3.4 cm. Pericardium No pericardial effusion. Past Medical History:   Diagnosis Date    Arthritis     Atrial fibrillation ([facility] [address]     Cancer ([facility] [address]     sigmoid colon    Congestive heart failure ([facility] [address]     Diabetes ([facility] [address]     prediabetic    Hypercholesterolemia     Hypertension      Past Surgical History:   Procedure Laterality Date    HX APPENDECTOMY  2011    HX CATARACT REMOVAL Bilateral     HX HERNIA REPAIR      HX TOTAL COLECTOMY  09/2013    cancer      Social History     Tobacco Use    Smoking status: Never    Smokeless tobacco: Never   Substance Use Topics    Alcohol use: Not Currently     Comment: rarely      Family History   Problem Relation Age of Onset    Cancer Mother     Coronary Art Dis Father     Cancer Sister     Cancer Brother     Emphysema Brother      Prior to Admission medications    Medication Sig Start Date End Date Taking? Authorizing Provider   metoprolol succinate (TOPROL-XL) 25 mg XL tablet Take 2 Tablets by mouth daily. Patient taking differently: Take 50 mg by mouth nightly. 12/23/22   Tashi Marques MD   rosuvastatin (CRESTOR) 20 mg tablet Take 1 tablet by mouth nightly 9/28/22   Bruna VÁSQUEZ III,    lisinopriL (PRINIVIL, ZESTRIL) 2.5 mg tablet Take 1 Tablet by mouth in the morning. **FOLLOW UP WITH DR BURNETT FOR FURTHER REFILLS**  Patient taking differently: Take 2.5 mg by mouth nightly. **FOLLOW UP WITH DR BARKER FOR FURTHER REFILLS** 8/15/22   Francisca Gregory MD   apixaban (ELIQUIS) 5 mg tablet Take 1 Tablet by mouth two (2) times a day.  Replaces aspirin on 1/10/2022 1/10/22   Melisa Perez MD       No Known Allergies      Review of Systems:   Consititutional: Denies fever or chills. + fatigue + weight gain  Eyes:  + glasses, ? cataracts  ENT:  Denies hearing or swallowing difficulty. CV: Denies CP, claudication\. +  HTN. Resp: Denies dyspnea, productive cough. : Denies dialysis or kidney problems. GI: Denies ulcers, esophageal strictures, liver problems. M/S: Denies joint or bone problems, or implanted artificial hardware. Skin: Denies varicose veins, edema. Neuro: Denies strokes, or TIAs. Psych: Denies anxiety or depression. Endocrine: Denies thyroid problems or diabetes. Heme/Lymphatic: Denies easy bruising or lymphedema. Objective:     VS: Temp  98.2  HR   83   RR 18   O2 sat 99%   BP (right)  110/66  BP (left)  107/74    Physical Exam:    General appearance: alert, cooperative, no distress  Head: normocephalic, without obvious abnormality; atraumatic  Eyes: conjunctivae/corneas clear; EOM's intact. Nose: nares normal; no drainage. Neck: no carotid bruit and no JVD  Lungs: clear to auscultation bilaterally  Heart: regular rate and rhythm; no murmur  Abdomen: soft, non-tender; bowel sounds normal  Extremities: moves all extremities; no weakness. Skin: Skin color normal; No varicose veins or edema.   Neurologic: Grossly normal      Labs:   Recent Labs     02/15/23  1318   WBC 7.1   HGB 14.5   HCT 45.3         K 4.3   BUN 21*   CREA 1.36*   *   INR 1.0       Diagnostics:   PA and lateral:   CXR Results  (Last 48 hours)      None          EKG:   EKG RESULTS       Procedure Component Value Units Date/Time    EKG, 12 LEAD, INITIAL [[id number]] Collected: 02/15/23 1320    Order Status: Completed Updated: 02/15/23 1328     Ventricular Rate 121 BPM      Atrial Rate 166 BPM      QRS Duration 82 ms      Q-T Interval 424 ms      QTC Calculation (Bezet) 602 ms      Calculated R Axis 21 degrees      Calculated T Axis 128 degrees      Diagnosis --     Atrial fibrillation with rapid ventricular response  Nonspecific T wave abnormality , probably digitalis effect  When compared with ECG of 23-DEC-2022 15:18,  No significant change was found            Assessment:     Persistent Atrial Fibrillation     Plan:   The risk and benefit of surgery were reviewed with patient and family and all questions answered and the patient wishes to proceed. Risk include infection, bleeding, stroke, heart attack, irregular heart rhythm, kidney failure and death. The patient was given instructions and prescriptions for bactroban, peridex and Lovenox (BID on 2/20/23 only). The patient was instructed to stop Eliquis with last dose on 2/19/23. Surgery is scheduled for 2/22/23. Treatment Plan:    Persistent Afib: On Toprol and Eliquis  Chronic systolic heart failure, NYHA class I: LVEF 40-45% on most recent TTE. On Toprol and Lisinopril  HTN: on Toprol 50mg and Lisinopril 2.5mg   HLD: On rosuvastatin   5. Overweight: BMI 27.8. diet and exercise counseling.        Signed By: Alberta Pham NP     February 15, 2023

## 2023-02-15 NOTE — PERIOP NOTES
[facility]  Preoperative Instructions        Surgery Date 2/22/23          Time of Arrival to be called at [phone]    1. On the day of your surgery, please report to the Surgical Services Registration Desk and sign in at your designated time. The Surgery Center is located to the right of the Emergency Room. 2. You must have someone with you to drive you home. You should not drive a car for 24 hours following surgery. Please make arrangements for a friend or family member to stay with you for the first 24 hours after your surgery. 3. Do not have anything to eat or drink (including water, gum, mints, coffee, juice) after midnight. ?This may not apply to medications prescribed by your physician. ?(Please note below the special instructions with medications to take the morning of your procedure.)    4. We recommend you do not drink any alcoholic beverages for 24 hours before and after your surgery. 5. Contact your surgeons office for instructions on the following medications: non-steroidal anti-inflammatory drugs (i.e. Advil, Aleve), vitamins, and supplements. (Some surgeons will want you to stop these medications prior to surgery and others may allow you to take them)  **If you are currently taking Plavix, Coumadin, Aspirin and/or other blood-thinning agents, contact your surgeon for instructions. ** Your surgeon will partner with the physician prescribing these medications to determine if it is safe to stop or if you need to continue taking. Please do not stop taking these medications without instructions from your surgeon    6. Wear comfortable clothes. Wear glasses instead of contacts. Do not bring any money or jewelry. Please bring picture ID, insurance card, and any prearranged co-payment or hospital payment. Do not wear make-up, particularly mascara the morning of your surgery. Do not wear nail polish, particularly if you are having foot /hand surgery.   Wear your hair loose or down, no ponytails, buns, glenny pins or clips. All body piercings must be removed. Please shower with antibacterial soap for three consecutive days before and on the morning of surgery, but do not apply any lotions, powders or deodorants after the shower on the day of surgery. Please use a fresh towels after each shower. Please sleep in clean clothes and change bed linens the night before surgery. Please do not shave for 48 hours prior to surgery. Shaving of the face is acceptable. 7. You should understand that if you do not follow these instructions your surgery may be cancelled. If your physical condition changes (I.e. fever, cold or flu) please contact your surgeon as soon as possible. 8. It is important that you be on time. If a situation occurs where you may be late, please call [phone] (OR Holding Area). 9. If you have any questions and or problems, please call [phone] (Pre-admission Testing). 10. Your surgery time may be subject to change. You will receive a phone call the evening prior if your time changes. 11.  If having outpatient surgery, you must have someone to drive you here, stay with you during the duration of your stay, and to drive you home at time of discharge. 12. Special Instructions: Monday 2/20: Being using mupirocin ointment and peridex mouthwash twice a day for two days. Bring ointment and mouthwash with you day of surgery. Last dose of eliquis Sunday 2/19. Take lovenox injection only on Monday 2/20 morning and night. TAKE ALL MEDICATIONS DAY OF SURGERY EXCEPT: no restrictions. I understand a pre-operative phone call will be made to verify my surgery time. In the event that I am not available, I give permission for a message to be left on my answering service and/or with another person?   yes         ___________________      __________   _________    (Signature of Patient)             (Witness)                (Date and Time)

## 2023-02-15 NOTE — PERIOP NOTES
Hibiclens/Chlorhexidine    Preventing Infections Before and After - Your Surgery    IMPORTANT INSTRUCTIONS    Please read and follow these instructions carefully. If you are unable to comply with the below instructions your procedure will be cancelled. Every Night for Three (3) nights before your surgery:  Shower with an antibacterial soap, such as Dial, or the soap provided at your preassessment appointment. A shower is better than a bath for cleaning your skin. If needed, ask someone to help you reach all areas of your body. Dont forget to clean your belly button with every shower. The night before your surgery: If you lose your Hibiclens/chlorhexidine please contact surgery center or you can purchase it at a local pharmacy  On the night before your surgery, shower with an antibacterial soap, such as Dial, or the soap provided at your preassessment appointment. With one packet of Hibiclens/Chlorhexidine in hand, turn water off. Apply Hibiclens antiseptic skin cleanser with a clean, freshly washed washcloth. Gently apply to your body from chin to toes (except the genital area) and especially the area(s) where your incision(s) will be. Leave Hibiclens/Chlorhexidine on your skin for at least 20 seconds. CAUTION: If needed, Hibiclens/chlorhexidine may be used to clean the folds of skin of the legs (such as in the area of the groin) and on your buttocks and hips. However, do not use Hibiclens/Chlorhexidine above the neck or in the genital area (your bottom) or put inside any area of your body. Turn the water back on and rinse. Dry gently with a clean, freshly washed towel. After your shower, do not use any powder, deodorant, perfumes or lotion. Use clean, freshly washed towels and washcloths every time you shower. Wear clean, freshly washed pajamas to bed the night before surgery. Sleep on clean, freshly washed sheets. Do not allow pets to sleep in your bed with you.         The Morning of your surgery:  Shower again thoroughly with an antibacterial soap, such as Dial or the soap provided at your preassessment appointment. If needed, ask someone for help to reach all areas of your body. Dont forget to clean your belly button! Rinse. Dry gently with a clean, freshly washed towel. After your shower, do not use any powder, deodorant, perfumes or lotion prior to surgery. Put on clean, freshly washed clothing. Tips to help prevent infections after your surgery:  Protect your surgical wound from germs:  Hand washing is the most important thing you and your caregivers can do to prevent infections. Keep your bandage clean and dry! Do not touch your surgical wound. Use clean, freshly washed towels and washcloths every time you shower; do not share bath linens with others. Until your surgical wound is healed, wear clothing and sleep on bed linens each day that are clean and freshly washed. Do not allow pets to sleep in your bed with you or touch your surgical wound. Do not smoke - smoking delays wound healing. This may be a good time to stop smoking. If you have diabetes, it is important for you to manage your blood sugar levels properly before your surgery as well as after your surgery. Poorly managed blood sugar levels slow down wound healing and prevent you from healing completely. If you lose your Hibiclens/chlorhexidine, please call the [place] campus, or it is available for purchase at your pharmacy.                ___________________      ___________________      ________________  (Signature of Patient)          (Witness)                   (Date and Time)

## 2023-02-15 NOTE — CONSENT
Informed Consent for Blood Component Transfusion Note    I have discussed with the patient the rationale for blood component transfusion; its benefits in treating or preventing fatigue, organ damage, or death; and its risk which includes mild transfusion reactions, rare risk of blood borne infection, or more serious but rare reactions. I have discussed the alternatives to transfusion, including the risk and consequences of not receiving transfusion. The parent had an opportunity to ask questions and had agreed to proceed with transfusion of blood components, as necessary.     Electronically signed by Alberta Pham NP on 2/15/23 at 3:28 PM

## 2023-02-16 LAB
ATRIAL RATE: 166 BPM
BACTERIA SPEC CULT: NORMAL
BACTERIA SPEC CULT: NORMAL
CALCULATED R AXIS, ECG10: 21 DEGREES
CALCULATED T AXIS, ECG11: 128 DEGREES
DIAGNOSIS, 93000: NORMAL
Q-T INTERVAL, ECG07: 424 MS
QRS DURATION, ECG06: 82 MS
QTC CALCULATION (BEZET), ECG08: 602 MS
SARS-COV-2 RNA RESP QL NAA+PROBE: NOT DETECTED
SERVICE CMNT-IMP: NORMAL
SOURCE, COVRS: NORMAL
VENTRICULAR RATE, ECG03: 121 BPM

## 2023-02-17 RX ORDER — SODIUM CHLORIDE, SODIUM LACTATE, POTASSIUM CHLORIDE, CALCIUM CHLORIDE 600; 310; 30; 20 MG/100ML; MG/100ML; MG/100ML; MG/100ML
25 INJECTION, SOLUTION INTRAVENOUS CONTINUOUS
OUTPATIENT
Start: 2023-02-22

## 2023-02-21 ENCOUNTER — ANESTHESIA EVENT (OUTPATIENT)
Dept: CARDIOTHORACIC SURGERY | Age: 68
End: 2023-02-21
Payer: MEDICARE

## 2023-02-21 RX ORDER — NITROGLYCERIN 20 MG/100ML
0-20 INJECTION INTRAVENOUS
Status: DISCONTINUED | OUTPATIENT
Start: 2023-02-22 | End: 2023-02-22

## 2023-02-22 ENCOUNTER — APPOINTMENT (OUTPATIENT)
Dept: GENERAL RADIOLOGY | Age: 68
DRG: 274 | End: 2023-02-22
Attending: INTERNAL MEDICINE
Payer: MEDICARE

## 2023-02-22 ENCOUNTER — HOSPITAL ENCOUNTER (INPATIENT)
Age: 68
LOS: 2 days | Discharge: HOME | DRG: 274 | End: 2023-02-24
Attending: THORACIC SURGERY (CARDIOTHORACIC VASCULAR SURGERY) | Admitting: THORACIC SURGERY (CARDIOTHORACIC VASCULAR SURGERY)
Payer: MEDICARE

## 2023-02-22 ENCOUNTER — APPOINTMENT (OUTPATIENT)
Dept: GENERAL RADIOLOGY | Age: 68
DRG: 274 | End: 2023-02-22
Attending: THORACIC SURGERY (CARDIOTHORACIC VASCULAR SURGERY)
Payer: MEDICARE

## 2023-02-22 ENCOUNTER — HOSPITAL ENCOUNTER (OUTPATIENT)
Dept: NON INVASIVE DIAGNOSTICS | Age: 68
Discharge: HOME | End: 2023-02-22
Attending: THORACIC SURGERY (CARDIOTHORACIC VASCULAR SURGERY)

## 2023-02-22 ENCOUNTER — ANESTHESIA (OUTPATIENT)
Dept: CARDIOTHORACIC SURGERY | Age: 68
End: 2023-02-22
Payer: MEDICARE

## 2023-02-22 VITALS
HEART RATE: 81 BPM | TEMPERATURE: 97.7 F | SYSTOLIC BLOOD PRESSURE: 130 MMHG | DIASTOLIC BLOOD PRESSURE: 74 MMHG | OXYGEN SATURATION: 100 % | RESPIRATION RATE: 14 BRPM

## 2023-02-22 DIAGNOSIS — I42.0 DILATED CARDIOMYOPATHY (HCC): ICD-10-CM

## 2023-02-22 DIAGNOSIS — I48.0 PAROXYSMAL ATRIAL FIBRILLATION (HCC): ICD-10-CM

## 2023-02-22 DIAGNOSIS — Z86.79 S/P ABLATION OPERATION FOR ARRHYTHMIA: Primary | ICD-10-CM

## 2023-02-22 DIAGNOSIS — Z98.890 S/P ABLATION OPERATION FOR ARRHYTHMIA: Primary | ICD-10-CM

## 2023-02-22 DIAGNOSIS — Z98.890 S/P LEFT ATRIAL APPENDAGE LIGATION: ICD-10-CM

## 2023-02-22 DIAGNOSIS — I48.91 ATRIAL FIBRILLATION, UNSPECIFIED TYPE (HCC): ICD-10-CM

## 2023-02-22 LAB
ACT BLD: 113 SECS (ref 79–138)
ACT BLD: 251 SECS (ref 79–138)
ACT BLD: 287 SECS (ref 79–138)
ALBUMIN SERPL-MCNC: 3.3 G/DL (ref 3.5–5)
ALBUMIN/GLOB SERPL: 1.1 (ref 1.1–2.2)
ALP SERPL-CCNC: 53 U/L (ref 45–117)
ALT SERPL-CCNC: 24 U/L (ref 12–78)
ANION GAP SERPL CALC-SCNC: 5 MMOL/L (ref 5–15)
APTT PPP: 23.5 SEC (ref 22.1–31)
AST SERPL-CCNC: 25 U/L (ref 15–37)
ATRIAL RATE: 83 BPM
BASOPHILS # BLD: 0 K/UL (ref 0–0.1)
BASOPHILS NFR BLD: 0 % (ref 0–1)
BILIRUB SERPL-MCNC: 1.1 MG/DL (ref 0.2–1)
BUN SERPL-MCNC: 19 MG/DL (ref 6–20)
BUN/CREAT SERPL: 14 (ref 12–20)
CALCIUM SERPL-MCNC: 8.2 MG/DL (ref 8.5–10.1)
CALCULATED P AXIS, ECG09: 74 DEGREES
CALCULATED R AXIS, ECG10: 34 DEGREES
CALCULATED T AXIS, ECG11: 113 DEGREES
CHLORIDE SERPL-SCNC: 110 MMOL/L (ref 97–108)
CO2 SERPL-SCNC: 22 MMOL/L (ref 21–32)
CREAT SERPL-MCNC: 1.4 MG/DL (ref 0.7–1.3)
DIAGNOSIS, 93000: NORMAL
DIFFERENTIAL METHOD BLD: ABNORMAL
EOSINOPHIL # BLD: 0 K/UL (ref 0–0.4)
EOSINOPHIL NFR BLD: 0 % (ref 0–7)
ERYTHROCYTE [DISTWIDTH] IN BLOOD BY AUTOMATED COUNT: 12.7 % (ref 11.5–14.5)
GLOBULIN SER CALC-MCNC: 3.1 G/DL (ref 2–4)
GLUCOSE BLD STRIP.AUTO-MCNC: 111 MG/DL (ref 65–117)
GLUCOSE SERPL-MCNC: 174 MG/DL (ref 65–100)
HCT VFR BLD AUTO: 40.2 % (ref 36.6–50.3)
HGB BLD-MCNC: 13.1 G/DL (ref 12.1–17)
IMM GRANULOCYTES # BLD AUTO: 0.2 K/UL (ref 0–0.04)
IMM GRANULOCYTES NFR BLD AUTO: 1 % (ref 0–0.5)
INR PPP: 1 (ref 0.9–1.1)
INR PPP: 1.1 (ref 0.9–1.1)
LYMPHOCYTES # BLD: 0.9 K/UL (ref 0.8–3.5)
LYMPHOCYTES NFR BLD: 6 % (ref 12–49)
MAGNESIUM SERPL-MCNC: 1.9 MG/DL (ref 1.6–2.4)
MCH RBC QN AUTO: 30.2 PG (ref 26–34)
MCHC RBC AUTO-ENTMCNC: 32.6 G/DL (ref 30–36.5)
MCV RBC AUTO: 92.6 FL (ref 80–99)
MONOCYTES # BLD: 0.3 K/UL (ref 0–1)
MONOCYTES NFR BLD: 2 % (ref 5–13)
NEUTS SEG # BLD: 14.1 K/UL (ref 1.8–8)
NEUTS SEG NFR BLD: 91 % (ref 32–75)
NRBC # BLD: 0 K/UL (ref 0–0.01)
NRBC BLD-RTO: 0 PER 100 WBC
P-R INTERVAL, ECG05: 164 MS
PLATELET # BLD AUTO: 146 K/UL (ref 150–400)
PLATELET COMMENTS,PCOM: ABNORMAL
PMV BLD AUTO: 11.9 FL (ref 8.9–12.9)
POTASSIUM SERPL-SCNC: 3.8 MMOL/L (ref 3.5–5.1)
PROT SERPL-MCNC: 6.4 G/DL (ref 6.4–8.2)
PROTHROMBIN TIME: 10.5 SEC (ref 9–11.1)
PROTHROMBIN TIME: 11.2 SEC (ref 9–11.1)
Q-T INTERVAL, ECG07: 400 MS
QRS DURATION, ECG06: 96 MS
QTC CALCULATION (BEZET), ECG08: 470 MS
RBC # BLD AUTO: 4.34 M/UL (ref 4.1–5.7)
RBC MORPH BLD: ABNORMAL
SERVICE CMNT-IMP: NORMAL
SODIUM SERPL-SCNC: 137 MMOL/L (ref 136–145)
THERAPEUTIC RANGE,PTTT: NORMAL SECS (ref 58–77)
TROPONIN I SERPL HS-MCNC: 1561 NG/L (ref 0–76)
VENTRICULAR RATE, ECG03: 83 BPM
WBC # BLD AUTO: 15.5 K/UL (ref 4.1–11.1)

## 2023-02-22 PROCEDURE — 74011000250 HC RX REV CODE- 250: Performed by: THORACIC SURGERY (CARDIOTHORACIC VASCULAR SURGERY)

## 2023-02-22 PROCEDURE — 74011250636 HC RX REV CODE- 250/636: Performed by: ANESTHESIOLOGY

## 2023-02-22 PROCEDURE — C1751 CATH, INF, PER/CENT/MIDLINE: HCPCS | Performed by: ANESTHESIOLOGY

## 2023-02-22 PROCEDURE — C1730 CATH, EP, 19 OR FEW ELECT: HCPCS | Performed by: INTERNAL MEDICINE

## 2023-02-22 PROCEDURE — 77030003029 HC SUT VCRL J&J -B: Performed by: THORACIC SURGERY (CARDIOTHORACIC VASCULAR SURGERY)

## 2023-02-22 PROCEDURE — 77030008606 HC TRCR ENDOSC KII AMR -B: Performed by: THORACIC SURGERY (CARDIOTHORACIC VASCULAR SURGERY)

## 2023-02-22 PROCEDURE — 77030041368: Performed by: THORACIC SURGERY (CARDIOTHORACIC VASCULAR SURGERY)

## 2023-02-22 PROCEDURE — 74011250637 HC RX REV CODE- 250/637: Performed by: NURSE PRACTITIONER

## 2023-02-22 PROCEDURE — 74011250636 HC RX REV CODE- 250/636: Performed by: NURSE ANESTHETIST, CERTIFIED REGISTERED

## 2023-02-22 PROCEDURE — 77030008608 HC TRCR ENDOSC SMTH AMR -B: Performed by: THORACIC SURGERY (CARDIOTHORACIC VASCULAR SURGERY)

## 2023-02-22 PROCEDURE — 93657 TX L/R ATRIAL FIB ADDL: CPT | Performed by: INTERNAL MEDICINE

## 2023-02-22 PROCEDURE — 2709999900 HC NON-CHARGEABLE SUPPLY: Performed by: THORACIC SURGERY (CARDIOTHORACIC VASCULAR SURGERY)

## 2023-02-22 PROCEDURE — 77030018729 HC ELECTRD DEFIB PAD CARD -B: Performed by: THORACIC SURGERY (CARDIOTHORACIC VASCULAR SURGERY)

## 2023-02-22 PROCEDURE — 02583ZZ DESTRUCTION OF CONDUCTION MECHANISM, PERCUTANEOUS APPROACH: ICD-10-PCS | Performed by: THORACIC SURGERY (CARDIOTHORACIC VASCULAR SURGERY)

## 2023-02-22 PROCEDURE — 85730 THROMBOPLASTIN TIME PARTIAL: CPT

## 2023-02-22 PROCEDURE — 36415 COLL VENOUS BLD VENIPUNCTURE: CPT

## 2023-02-22 PROCEDURE — 74011000250 HC RX REV CODE- 250: Performed by: INTERNAL MEDICINE

## 2023-02-22 PROCEDURE — C1893 INTRO/SHEATH, FIXED,NON-PEEL: HCPCS | Performed by: INTERNAL MEDICINE

## 2023-02-22 PROCEDURE — 71045 X-RAY EXAM CHEST 1 VIEW: CPT

## 2023-02-22 PROCEDURE — C1732 CATH, EP, DIAG/ABL, 3D/VECT: HCPCS | Performed by: INTERNAL MEDICINE

## 2023-02-22 PROCEDURE — 77030012407 HC DRN WND BARD -B: Performed by: THORACIC SURGERY (CARDIOTHORACIC VASCULAR SURGERY)

## 2023-02-22 PROCEDURE — 33265 ABLATE ATRIA LMTD ENDO: CPT | Performed by: PHYSICIAN ASSISTANT

## 2023-02-22 PROCEDURE — 77030008671 HC TU ENDO/BRNC CUF COVD -B: Performed by: NURSE ANESTHETIST, CERTIFIED REGISTERED

## 2023-02-22 PROCEDURE — 74011250636 HC RX REV CODE- 250/636: Performed by: NURSE PRACTITIONER

## 2023-02-22 PROCEDURE — 77030005513 HC CATH URETH FOL11 MDII -B: Performed by: THORACIC SURGERY (CARDIOTHORACIC VASCULAR SURGERY)

## 2023-02-22 PROCEDURE — 93656 COMPRE EP EVAL ABLTJ ATR FIB: CPT | Performed by: INTERNAL MEDICINE

## 2023-02-22 PROCEDURE — C1759 CATH, INTRA ECHOCARDIOGRAPHY: HCPCS | Performed by: INTERNAL MEDICINE

## 2023-02-22 PROCEDURE — C1894 INTRO/SHEATH, NON-LASER: HCPCS | Performed by: INTERNAL MEDICINE

## 2023-02-22 PROCEDURE — 77030011220 HC DEV ELECSURG COVD -B: Performed by: THORACIC SURGERY (CARDIOTHORACIC VASCULAR SURGERY)

## 2023-02-22 PROCEDURE — 82962 GLUCOSE BLOOD TEST: CPT

## 2023-02-22 PROCEDURE — 77030037878 HC DRSG MEPILEX >48IN BORD MOLN -B: Performed by: THORACIC SURGERY (CARDIOTHORACIC VASCULAR SURGERY)

## 2023-02-22 PROCEDURE — 74011250637 HC RX REV CODE- 250/637: Performed by: INTERNAL MEDICINE

## 2023-02-22 PROCEDURE — 77030020829: Performed by: THORACIC SURGERY (CARDIOTHORACIC VASCULAR SURGERY)

## 2023-02-22 PROCEDURE — 77030008771 HC TU NG SALEM SUMP -A: Performed by: ANESTHESIOLOGY

## 2023-02-22 PROCEDURE — 77030002933 HC SUT MCRYL J&J -A: Performed by: THORACIC SURGERY (CARDIOTHORACIC VASCULAR SURGERY)

## 2023-02-22 PROCEDURE — 77030041009 HC ADTR CBL EP DX J&J -D: Performed by: INTERNAL MEDICINE

## 2023-02-22 PROCEDURE — 85610 PROTHROMBIN TIME: CPT

## 2023-02-22 PROCEDURE — 77030020506 HC NDL TRNSPTL NRG BAYL -F: Performed by: INTERNAL MEDICINE

## 2023-02-22 PROCEDURE — 77030010507 HC ADH SKN DERMBND J&J -B: Performed by: THORACIC SURGERY (CARDIOTHORACIC VASCULAR SURGERY)

## 2023-02-22 PROCEDURE — 76010000110 HC CV SURG 3 TO 3.5 HR: Performed by: THORACIC SURGERY (CARDIOTHORACIC VASCULAR SURGERY)

## 2023-02-22 PROCEDURE — 84484 ASSAY OF TROPONIN QUANT: CPT

## 2023-02-22 PROCEDURE — 74011000250 HC RX REV CODE- 250: Performed by: NURSE ANESTHETIST, CERTIFIED REGISTERED

## 2023-02-22 PROCEDURE — 77030013797 HC KT TRNSDUC PRSSR EDWD -A: Performed by: ANESTHESIOLOGY

## 2023-02-22 PROCEDURE — 77030020704 HC DISECT ENDOSC BLNT J&J -B: Performed by: THORACIC SURGERY (CARDIOTHORACIC VASCULAR SURGERY)

## 2023-02-22 PROCEDURE — 77030015398 HC CBL EP EXT STJU -C: Performed by: INTERNAL MEDICINE

## 2023-02-22 PROCEDURE — 76060000037 HC ANESTHESIA 3 TO 3.5 HR: Performed by: THORACIC SURGERY (CARDIOTHORACIC VASCULAR SURGERY)

## 2023-02-22 PROCEDURE — 74011250636 HC RX REV CODE- 250/636: Performed by: PHYSICIAN ASSISTANT

## 2023-02-22 PROCEDURE — 77030002996 HC SUT SLK J&J -A: Performed by: THORACIC SURGERY (CARDIOTHORACIC VASCULAR SURGERY)

## 2023-02-22 PROCEDURE — 33265 ABLATE ATRIA LMTD ENDO: CPT | Performed by: THORACIC SURGERY (CARDIOTHORACIC VASCULAR SURGERY)

## 2023-02-22 PROCEDURE — 74011000250 HC RX REV CODE- 250: Performed by: NURSE PRACTITIONER

## 2023-02-22 PROCEDURE — 74011000250 HC RX REV CODE- 250: Performed by: PHYSICIAN ASSISTANT

## 2023-02-22 PROCEDURE — 77030013797 HC KT TRNSDUC PRSSR EDWD -A: Performed by: INTERNAL MEDICINE

## 2023-02-22 PROCEDURE — 02L74CK OCCLUSION OF LEFT ATRIAL APPENDAGE WITH EXTRALUMINAL DEVICE, PERCUTANEOUS ENDOSCOPIC APPROACH: ICD-10-PCS | Performed by: THORACIC SURGERY (CARDIOTHORACIC VASCULAR SURGERY)

## 2023-02-22 PROCEDURE — 74011000250 HC RX REV CODE- 250: Performed by: ANESTHESIOLOGY

## 2023-02-22 PROCEDURE — 77030021678 HC GLIDESCP STAT DISP VERT -B: Performed by: ANESTHESIOLOGY

## 2023-02-22 PROCEDURE — 77030040504 HC DRN WND MDII -B: Performed by: THORACIC SURGERY (CARDIOTHORACIC VASCULAR SURGERY)

## 2023-02-22 PROCEDURE — 85025 COMPLETE CBC W/AUTO DIFF WBC: CPT

## 2023-02-22 PROCEDURE — 77030018843 HC SOL IRR SOD CL 9% SLSH BAXT -B: Performed by: THORACIC SURGERY (CARDIOTHORACIC VASCULAR SURGERY)

## 2023-02-22 PROCEDURE — 77030020053 HC ELECTRD LAPSCP COVD -B: Performed by: THORACIC SURGERY (CARDIOTHORACIC VASCULAR SURGERY)

## 2023-02-22 PROCEDURE — 77030010880 HC CBL EP SUPRME STJU -C: Performed by: INTERNAL MEDICINE

## 2023-02-22 PROCEDURE — 77030034404 HC DEV ABLAT CARD EPISENSE ATRC -K3: Performed by: THORACIC SURGERY (CARDIOTHORACIC VASCULAR SURGERY)

## 2023-02-22 PROCEDURE — 77030016151 HC PROTCTR LNS DFOG COVD -B: Performed by: THORACIC SURGERY (CARDIOTHORACIC VASCULAR SURGERY)

## 2023-02-22 PROCEDURE — 77030018673: Performed by: THORACIC SURGERY (CARDIOTHORACIC VASCULAR SURGERY)

## 2023-02-22 PROCEDURE — 77030038015 HC ATRICLIP PRO GILLINOV ATRC -I1: Performed by: THORACIC SURGERY (CARDIOTHORACIC VASCULAR SURGERY)

## 2023-02-22 PROCEDURE — 77030034154 HC SHR COAG HARM ACE J&J -F: Performed by: THORACIC SURGERY (CARDIOTHORACIC VASCULAR SURGERY)

## 2023-02-22 PROCEDURE — 77030040754 HC DRSG QCLOT ZMED -D: Performed by: INTERNAL MEDICINE

## 2023-02-22 PROCEDURE — 77030035291 HC TBNG PMP SMARTABLATE J&J -B: Performed by: INTERNAL MEDICINE

## 2023-02-22 PROCEDURE — 74011250636 HC RX REV CODE- 250/636: Performed by: THORACIC SURGERY (CARDIOTHORACIC VASCULAR SURGERY)

## 2023-02-22 PROCEDURE — 74011250637 HC RX REV CODE- 250/637: Performed by: PHYSICIAN ASSISTANT

## 2023-02-22 PROCEDURE — 77030029359 HC PRB ESOPH TEMP CATH ANTM -F: Performed by: INTERNAL MEDICINE

## 2023-02-22 PROCEDURE — 74011250636 HC RX REV CODE- 250/636: Performed by: INTERNAL MEDICINE

## 2023-02-22 PROCEDURE — 77030005401 HC CATH RAD ARRO -A: Performed by: ANESTHESIOLOGY

## 2023-02-22 PROCEDURE — 93005 ELECTROCARDIOGRAM TRACING: CPT

## 2023-02-22 PROCEDURE — 85347 COAGULATION TIME ACTIVATED: CPT

## 2023-02-22 PROCEDURE — 77030031139 HC SUT VCRL2 J&J -A: Performed by: THORACIC SURGERY (CARDIOTHORACIC VASCULAR SURGERY)

## 2023-02-22 PROCEDURE — 93621 COMP EP EVL L PAC&REC C SINS: CPT | Performed by: INTERNAL MEDICINE

## 2023-02-22 PROCEDURE — 65620000000 HC RM CCU GENERAL

## 2023-02-22 PROCEDURE — 77030027107 HC PTCH EXT REF CARTO3 J&J -F: Performed by: INTERNAL MEDICINE

## 2023-02-22 PROCEDURE — 83735 ASSAY OF MAGNESIUM: CPT

## 2023-02-22 PROCEDURE — 2709999900 HC NON-CHARGEABLE SUPPLY: Performed by: INTERNAL MEDICINE

## 2023-02-22 PROCEDURE — 76000 FLUOROSCOPY <1 HR PHYS/QHP: CPT

## 2023-02-22 PROCEDURE — 74011250637 HC RX REV CODE- 250/637: Performed by: THORACIC SURGERY (CARDIOTHORACIC VASCULAR SURGERY)

## 2023-02-22 PROCEDURE — 80053 COMPREHEN METABOLIC PANEL: CPT

## 2023-02-22 PROCEDURE — 77030013079 HC BLNKT BAIR HGGR 3M -A: Performed by: NURSE ANESTHETIST, CERTIFIED REGISTERED

## 2023-02-22 DEVICE — IMPLANTABLE DEVICE: Type: IMPLANTABLE DEVICE | Site: HEART | Status: FUNCTIONAL

## 2023-02-22 RX ORDER — BUPIVACAINE HYDROCHLORIDE 5 MG/ML
INJECTION, SOLUTION EPIDURAL; INTRACAUDAL
Status: COMPLETED | OUTPATIENT
Start: 2023-02-22 | End: 2023-02-22

## 2023-02-22 RX ORDER — CHLORHEXIDINE GLUCONATE 1.2 MG/ML
10 RINSE ORAL EVERY 12 HOURS
Status: DISCONTINUED | OUTPATIENT
Start: 2023-02-22 | End: 2023-02-24

## 2023-02-22 RX ORDER — PROPOFOL 10 MG/ML
INJECTION, EMULSION INTRAVENOUS AS NEEDED
Status: DISCONTINUED | OUTPATIENT
Start: 2023-02-22 | End: 2023-02-22

## 2023-02-22 RX ORDER — NALOXONE HYDROCHLORIDE 0.4 MG/ML
0.4 INJECTION, SOLUTION INTRAMUSCULAR; INTRAVENOUS; SUBCUTANEOUS AS NEEDED
Status: DISCONTINUED | OUTPATIENT
Start: 2023-02-22 | End: 2023-02-24 | Stop reason: HOSPADM

## 2023-02-22 RX ORDER — OXYCODONE HYDROCHLORIDE 5 MG/1
10 TABLET ORAL
Status: DISCONTINUED | OUTPATIENT
Start: 2023-02-22 | End: 2023-02-24 | Stop reason: HOSPADM

## 2023-02-22 RX ORDER — PHENYLEPHRINE HCL IN 0.9% NACL 0.4MG/10ML
SYRINGE (ML) INTRAVENOUS AS NEEDED
Status: DISCONTINUED | OUTPATIENT
Start: 2023-02-22 | End: 2023-02-22 | Stop reason: HOSPADM

## 2023-02-22 RX ORDER — POLYETHYLENE GLYCOL 3350 17 G/17G
17 POWDER, FOR SOLUTION ORAL DAILY
Status: DISCONTINUED | OUTPATIENT
Start: 2023-02-23 | End: 2023-02-24 | Stop reason: HOSPADM

## 2023-02-22 RX ORDER — OXYCODONE HYDROCHLORIDE 5 MG/1
5 TABLET ORAL
Status: DISCONTINUED | OUTPATIENT
Start: 2023-02-22 | End: 2023-02-24 | Stop reason: HOSPADM

## 2023-02-22 RX ORDER — ESMOLOL HYDROCHLORIDE 10 MG/ML
INJECTION INTRAVENOUS AS NEEDED
Status: DISCONTINUED | OUTPATIENT
Start: 2023-02-22 | End: 2023-02-22 | Stop reason: HOSPADM

## 2023-02-22 RX ORDER — LIDOCAINE 4 G/100G
2 PATCH TOPICAL EVERY 24 HOURS
Status: DISCONTINUED | OUTPATIENT
Start: 2023-02-22 | End: 2023-02-24 | Stop reason: HOSPADM

## 2023-02-22 RX ORDER — FENTANYL CITRATE 50 UG/ML
INJECTION, SOLUTION INTRAMUSCULAR; INTRAVENOUS AS NEEDED
Status: DISCONTINUED | OUTPATIENT
Start: 2023-02-22 | End: 2023-02-22 | Stop reason: HOSPADM

## 2023-02-22 RX ORDER — LANOLIN ALCOHOL/MO/W.PET/CERES
400 CREAM (GRAM) TOPICAL 2 TIMES DAILY
Status: DISCONTINUED | OUTPATIENT
Start: 2023-02-23 | End: 2023-02-24 | Stop reason: HOSPADM

## 2023-02-22 RX ORDER — PROPOFOL 10 MG/ML
INJECTION, EMULSION INTRAVENOUS AS NEEDED
Status: DISCONTINUED | OUTPATIENT
Start: 2023-02-22 | End: 2023-02-22 | Stop reason: HOSPADM

## 2023-02-22 RX ORDER — SODIUM CHLORIDE 0.9 % (FLUSH) 0.9 %
5-40 SYRINGE (ML) INJECTION EVERY 8 HOURS
Status: DISCONTINUED | OUTPATIENT
Start: 2023-02-22 | End: 2023-02-24 | Stop reason: HOSPADM

## 2023-02-22 RX ORDER — ACETAMINOPHEN 500 MG
1000 TABLET ORAL EVERY 6 HOURS
Status: DISCONTINUED | OUTPATIENT
Start: 2023-02-22 | End: 2023-02-24 | Stop reason: HOSPADM

## 2023-02-22 RX ORDER — GLYCOPYRROLATE 0.2 MG/ML
INJECTION INTRAMUSCULAR; INTRAVENOUS AS NEEDED
Status: DISCONTINUED | OUTPATIENT
Start: 2023-02-22 | End: 2023-02-22 | Stop reason: HOSPADM

## 2023-02-22 RX ORDER — ROSUVASTATIN CALCIUM 20 MG/1
20 TABLET, COATED ORAL
Status: DISCONTINUED | OUTPATIENT
Start: 2023-02-22 | End: 2023-02-24 | Stop reason: HOSPADM

## 2023-02-22 RX ORDER — ALBUTEROL SULFATE 0.83 MG/ML
2.5 SOLUTION RESPIRATORY (INHALATION)
Status: DISCONTINUED | OUTPATIENT
Start: 2023-02-22 | End: 2023-02-24 | Stop reason: HOSPADM

## 2023-02-22 RX ORDER — SODIUM CHLORIDE 9 MG/ML
INJECTION, SOLUTION INTRAVENOUS
Status: DISCONTINUED | OUTPATIENT
Start: 2023-02-22 | End: 2023-02-22 | Stop reason: HOSPADM

## 2023-02-22 RX ORDER — LANOLIN ALCOHOL/MO/W.PET/CERES
3-6 CREAM (GRAM) TOPICAL
Status: DISCONTINUED | OUTPATIENT
Start: 2023-02-22 | End: 2023-02-24 | Stop reason: HOSPADM

## 2023-02-22 RX ORDER — MORPHINE SULFATE 4 MG/ML
4 INJECTION INTRAVENOUS
Status: DISCONTINUED | OUTPATIENT
Start: 2023-02-22 | End: 2023-02-23

## 2023-02-22 RX ORDER — AMOXICILLIN 250 MG
1 CAPSULE ORAL 2 TIMES DAILY
Status: DISCONTINUED | OUTPATIENT
Start: 2023-02-23 | End: 2023-02-24 | Stop reason: HOSPADM

## 2023-02-22 RX ORDER — SODIUM CHLORIDE 0.9 % (FLUSH) 0.9 %
5-40 SYRINGE (ML) INJECTION AS NEEDED
Status: DISCONTINUED | OUTPATIENT
Start: 2023-02-22 | End: 2023-02-24 | Stop reason: HOSPADM

## 2023-02-22 RX ORDER — ACETAMINOPHEN 325 MG/1
650 TABLET ORAL EVERY 4 HOURS
Status: DISCONTINUED | OUTPATIENT
Start: 2023-02-22 | End: 2023-02-22

## 2023-02-22 RX ORDER — PROTAMINE SULFATE 10 MG/ML
INJECTION, SOLUTION INTRAVENOUS AS NEEDED
Status: DISCONTINUED | OUTPATIENT
Start: 2023-02-22 | End: 2023-02-22 | Stop reason: HOSPADM

## 2023-02-22 RX ORDER — FACIAL-BODY WIPES
10 EACH TOPICAL DAILY PRN
Status: DISCONTINUED | OUTPATIENT
Start: 2023-02-22 | End: 2023-02-24 | Stop reason: HOSPADM

## 2023-02-22 RX ORDER — ROCURONIUM BROMIDE 10 MG/ML
INJECTION, SOLUTION INTRAVENOUS AS NEEDED
Status: DISCONTINUED | OUTPATIENT
Start: 2023-02-22 | End: 2023-02-22 | Stop reason: HOSPADM

## 2023-02-22 RX ORDER — NEOSTIGMINE METHYLSULFATE 5 MG/5 ML
SYRINGE (ML) INTRAVENOUS AS NEEDED
Status: DISCONTINUED | OUTPATIENT
Start: 2023-02-22 | End: 2023-02-22 | Stop reason: HOSPADM

## 2023-02-22 RX ORDER — MIDAZOLAM HYDROCHLORIDE 1 MG/ML
INJECTION, SOLUTION INTRAMUSCULAR; INTRAVENOUS AS NEEDED
Status: DISCONTINUED | OUTPATIENT
Start: 2023-02-22 | End: 2023-02-22 | Stop reason: HOSPADM

## 2023-02-22 RX ORDER — HEPARIN SODIUM 1000 [USP'U]/ML
INJECTION, SOLUTION INTRAVENOUS; SUBCUTANEOUS AS NEEDED
Status: DISCONTINUED | OUTPATIENT
Start: 2023-02-22 | End: 2023-02-22 | Stop reason: HOSPADM

## 2023-02-22 RX ORDER — FAMOTIDINE 20 MG/1
20 TABLET, FILM COATED ORAL EVERY 12 HOURS
Status: DISCONTINUED | OUTPATIENT
Start: 2023-02-23 | End: 2023-02-24 | Stop reason: HOSPADM

## 2023-02-22 RX ORDER — SODIUM CHLORIDE, SODIUM LACTATE, POTASSIUM CHLORIDE, CALCIUM CHLORIDE 600; 310; 30; 20 MG/100ML; MG/100ML; MG/100ML; MG/100ML
25 INJECTION, SOLUTION INTRAVENOUS CONTINUOUS
Status: DISCONTINUED | OUTPATIENT
Start: 2023-02-22 | End: 2023-02-22

## 2023-02-22 RX ORDER — PROPOFOL 10 MG/ML
INJECTION, EMULSION INTRAVENOUS
Status: DISCONTINUED | OUTPATIENT
Start: 2023-02-22 | End: 2023-02-22 | Stop reason: HOSPADM

## 2023-02-22 RX ORDER — ONDANSETRON 2 MG/ML
4 INJECTION INTRAMUSCULAR; INTRAVENOUS
Status: DISCONTINUED | OUTPATIENT
Start: 2023-02-22 | End: 2023-02-24 | Stop reason: HOSPADM

## 2023-02-22 RX ORDER — AMIODARONE HYDROCHLORIDE 200 MG/1
200 TABLET ORAL 2 TIMES DAILY
Status: DISCONTINUED | OUTPATIENT
Start: 2023-02-22 | End: 2023-02-24 | Stop reason: HOSPADM

## 2023-02-22 RX ORDER — MUPIROCIN 20 MG/G
OINTMENT TOPICAL 2 TIMES DAILY
Status: DISCONTINUED | OUTPATIENT
Start: 2023-02-22 | End: 2023-02-24 | Stop reason: HOSPADM

## 2023-02-22 RX ORDER — SUCCINYLCHOLINE CHLORIDE 20 MG/ML
INJECTION INTRAMUSCULAR; INTRAVENOUS AS NEEDED
Status: DISCONTINUED | OUTPATIENT
Start: 2023-02-22 | End: 2023-02-22 | Stop reason: HOSPADM

## 2023-02-22 RX ORDER — SODIUM CHLORIDE 0.9 % (FLUSH) 0.9 %
5-40 SYRINGE (ML) INJECTION EVERY 8 HOURS
Status: DISCONTINUED | OUTPATIENT
Start: 2023-02-22 | End: 2023-02-22

## 2023-02-22 RX ORDER — BACITRACIN 500 UNIT/G
1 PACKET (EA) TOPICAL AS NEEDED
Status: DISCONTINUED | OUTPATIENT
Start: 2023-02-22 | End: 2023-02-23

## 2023-02-22 RX ORDER — LIDOCAINE HYDROCHLORIDE 20 MG/ML
INJECTION, SOLUTION EPIDURAL; INFILTRATION; INTRACAUDAL; PERINEURAL AS NEEDED
Status: DISCONTINUED | OUTPATIENT
Start: 2023-02-22 | End: 2023-02-22 | Stop reason: HOSPADM

## 2023-02-22 RX ORDER — SODIUM CHLORIDE 0.9 % (FLUSH) 0.9 %
5-40 SYRINGE (ML) INJECTION AS NEEDED
Status: DISCONTINUED | OUTPATIENT
Start: 2023-02-22 | End: 2023-02-22

## 2023-02-22 RX ORDER — HEPARIN SODIUM 200 [USP'U]/100ML
INJECTION, SOLUTION INTRAVENOUS
Status: COMPLETED | OUTPATIENT
Start: 2023-02-22 | End: 2023-02-22

## 2023-02-22 RX ADMIN — Medication 3 MG: at 14:13

## 2023-02-22 RX ADMIN — ESMOLOL HYDROCHLORIDE 10 MG: 10 INJECTION, SOLUTION INTRAVENOUS at 12:09

## 2023-02-22 RX ADMIN — Medication 3 AMPULE: at 07:49

## 2023-02-22 RX ADMIN — LIDOCAINE HYDROCHLORIDE 100 MG: 20 INJECTION, SOLUTION EPIDURAL; INFILTRATION; INTRACAUDAL; PERINEURAL at 09:45

## 2023-02-22 RX ADMIN — HEPARIN SODIUM 7000 UNITS: 1000 INJECTION, SOLUTION INTRAVENOUS; SUBCUTANEOUS at 13:37

## 2023-02-22 RX ADMIN — FENTANYL CITRATE 50 MCG: 50 INJECTION INTRAMUSCULAR; INTRAVENOUS at 11:26

## 2023-02-22 RX ADMIN — SODIUM CHLORIDE 80 MCG: 900 INJECTION, SOLUTION INTRAVENOUS at 09:45

## 2023-02-22 RX ADMIN — FENTANYL CITRATE 50 MCG: 50 INJECTION INTRAMUSCULAR; INTRAVENOUS at 12:45

## 2023-02-22 RX ADMIN — ESMOLOL HYDROCHLORIDE 10 MG: 10 INJECTION, SOLUTION INTRAVENOUS at 10:07

## 2023-02-22 RX ADMIN — PROPOFOL 150 MG: 10 INJECTION, EMULSION INTRAVENOUS at 09:45

## 2023-02-22 RX ADMIN — Medication 80 MCG: at 13:18

## 2023-02-22 RX ADMIN — METHYLPREDNISOLONE SODIUM SUCCINATE 125 MG: 125 INJECTION, POWDER, FOR SOLUTION INTRAMUSCULAR; INTRAVENOUS at 21:21

## 2023-02-22 RX ADMIN — ROCURONIUM BROMIDE 5 MG: 10 INJECTION INTRAVENOUS at 09:45

## 2023-02-22 RX ADMIN — ESMOLOL HYDROCHLORIDE 10 MG: 10 INJECTION, SOLUTION INTRAVENOUS at 11:47

## 2023-02-22 RX ADMIN — ESMOLOL HYDROCHLORIDE 10 MG: 10 INJECTION, SOLUTION INTRAVENOUS at 12:01

## 2023-02-22 RX ADMIN — SODIUM CHLORIDE, PRESERVATIVE FREE 10 ML: 5 INJECTION INTRAVENOUS at 15:27

## 2023-02-22 RX ADMIN — BUPIVACAINE HYDROCHLORIDE 20 ML: 5 INJECTION, SOLUTION EPIDURAL; INTRACAUDAL; PERINEURAL at 12:42

## 2023-02-22 RX ADMIN — ESMOLOL HYDROCHLORIDE 10 MG: 10 INJECTION, SOLUTION INTRAVENOUS at 11:43

## 2023-02-22 RX ADMIN — MORPHINE SULFATE 4 MG: 4 INJECTION INTRAVENOUS at 22:51

## 2023-02-22 RX ADMIN — ROCURONIUM BROMIDE 95 MG: 10 INJECTION INTRAVENOUS at 10:00

## 2023-02-22 RX ADMIN — SODIUM CHLORIDE, PRESERVATIVE FREE 10 ML: 5 INJECTION INTRAVENOUS at 20:51

## 2023-02-22 RX ADMIN — WATER 2 G: 1 INJECTION INTRAMUSCULAR; INTRAVENOUS; SUBCUTANEOUS at 21:20

## 2023-02-22 RX ADMIN — MORPHINE SULFATE 4 MG: 4 INJECTION INTRAVENOUS at 20:51

## 2023-02-22 RX ADMIN — MUPIROCIN: 20 OINTMENT TOPICAL at 17:33

## 2023-02-22 RX ADMIN — ESMOLOL HYDROCHLORIDE 10 MG: 10 INJECTION, SOLUTION INTRAVENOUS at 11:53

## 2023-02-22 RX ADMIN — MIDAZOLAM HYDROCHLORIDE 1 MG: 1 INJECTION, SOLUTION INTRAMUSCULAR; INTRAVENOUS at 08:13

## 2023-02-22 RX ADMIN — ESMOLOL HYDROCHLORIDE 10 MG: 10 INJECTION, SOLUTION INTRAVENOUS at 11:51

## 2023-02-22 RX ADMIN — ESMOLOL HYDROCHLORIDE 10 MG: 10 INJECTION, SOLUTION INTRAVENOUS at 11:41

## 2023-02-22 RX ADMIN — ESMOLOL HYDROCHLORIDE 10 MG: 10 INJECTION, SOLUTION INTRAVENOUS at 11:49

## 2023-02-22 RX ADMIN — METHYLPREDNISOLONE SODIUM SUCCINATE 125 MG: 125 INJECTION, POWDER, FOR SOLUTION INTRAMUSCULAR; INTRAVENOUS at 10:04

## 2023-02-22 RX ADMIN — ESMOLOL HYDROCHLORIDE 10 MG: 10 INJECTION, SOLUTION INTRAVENOUS at 11:59

## 2023-02-22 RX ADMIN — ESMOLOL HYDROCHLORIDE 10 MG: 10 INJECTION, SOLUTION INTRAVENOUS at 11:37

## 2023-02-22 RX ADMIN — FENTANYL CITRATE 50 MCG: 50 INJECTION INTRAMUSCULAR; INTRAVENOUS at 08:13

## 2023-02-22 RX ADMIN — MORPHINE SULFATE 4 MG: 4 INJECTION INTRAVENOUS at 15:21

## 2023-02-22 RX ADMIN — ESMOLOL HYDROCHLORIDE 10 MG: 10 INJECTION, SOLUTION INTRAVENOUS at 11:57

## 2023-02-22 RX ADMIN — OXYCODONE 5 MG: 5 TABLET ORAL at 21:24

## 2023-02-22 RX ADMIN — FENTANYL CITRATE 100 MCG: 50 INJECTION INTRAMUSCULAR; INTRAVENOUS at 09:45

## 2023-02-22 RX ADMIN — ESMOLOL HYDROCHLORIDE 10 MG: 10 INJECTION, SOLUTION INTRAVENOUS at 11:55

## 2023-02-22 RX ADMIN — ESMOLOL HYDROCHLORIDE 10 MG: 10 INJECTION, SOLUTION INTRAVENOUS at 12:03

## 2023-02-22 RX ADMIN — ROCURONIUM BROMIDE 50 MG: 10 INJECTION INTRAVENOUS at 11:45

## 2023-02-22 RX ADMIN — ONDANSETRON 4 MG: 2 INJECTION INTRAMUSCULAR; INTRAVENOUS at 15:20

## 2023-02-22 RX ADMIN — ESMOLOL HYDROCHLORIDE 10 MG: 10 INJECTION, SOLUTION INTRAVENOUS at 12:05

## 2023-02-22 RX ADMIN — SUCCINYLCHOLINE CHLORIDE 120 MG: 20 INJECTION, SOLUTION INTRAMUSCULAR; INTRAVENOUS at 09:45

## 2023-02-22 RX ADMIN — ESMOLOL HYDROCHLORIDE 10 MG: 10 INJECTION, SOLUTION INTRAVENOUS at 11:39

## 2023-02-22 RX ADMIN — ESMOLOL HYDROCHLORIDE 10 MG: 10 INJECTION, SOLUTION INTRAVENOUS at 12:07

## 2023-02-22 RX ADMIN — ESMOLOL HYDROCHLORIDE 10 MG: 10 INJECTION, SOLUTION INTRAVENOUS at 11:45

## 2023-02-22 RX ADMIN — ACETAMINOPHEN 1000 MG: 500 TABLET ORAL at 22:54

## 2023-02-22 RX ADMIN — GLYCOPYRROLATE 0.4 MG: 0.2 INJECTION, SOLUTION INTRAMUSCULAR; INTRAVENOUS at 14:13

## 2023-02-22 RX ADMIN — CHLORHEXIDINE GLUCONATE 10 ML: 1.2 RINSE ORAL at 20:53

## 2023-02-22 RX ADMIN — MIDAZOLAM HYDROCHLORIDE 1 MG: 1 INJECTION, SOLUTION INTRAMUSCULAR; INTRAVENOUS at 08:15

## 2023-02-22 RX ADMIN — ACETAMINOPHEN 1000 MG: 500 TABLET ORAL at 17:33

## 2023-02-22 RX ADMIN — ROSUVASTATIN 20 MG: 20 TABLET, FILM COATED ORAL at 21:24

## 2023-02-22 RX ADMIN — ESMOLOL HYDROCHLORIDE 10 MG: 10 INJECTION, SOLUTION INTRAVENOUS at 12:15

## 2023-02-22 RX ADMIN — CHLORHEXIDINE GLUCONATE 10 ML: 1.2 RINSE ORAL at 17:33

## 2023-02-22 RX ADMIN — SODIUM CHLORIDE, POTASSIUM CHLORIDE, SODIUM LACTATE AND CALCIUM CHLORIDE 25 ML/HR: 600; 310; 30; 20 INJECTION, SOLUTION INTRAVENOUS at 07:49

## 2023-02-22 RX ADMIN — WATER 2 G: 1 INJECTION INTRAMUSCULAR; INTRAVENOUS; SUBCUTANEOUS at 10:04

## 2023-02-22 RX ADMIN — SODIUM CHLORIDE: 9 INJECTION, SOLUTION INTRAVENOUS at 11:58

## 2023-02-22 RX ADMIN — WATER 2 G: 1 INJECTION INTRAMUSCULAR; INTRAVENOUS; SUBCUTANEOUS at 15:20

## 2023-02-22 RX ADMIN — AMIODARONE HYDROCHLORIDE 200 MG: 200 TABLET ORAL at 17:33

## 2023-02-22 RX ADMIN — PROTAMINE SULFATE 100 MG: 10 INJECTION, SOLUTION INTRAVENOUS at 14:09

## 2023-02-22 RX ADMIN — HEPARIN SODIUM 14000 UNITS: 1000 INJECTION, SOLUTION INTRAVENOUS; SUBCUTANEOUS at 13:18

## 2023-02-22 RX ADMIN — ESMOLOL HYDROCHLORIDE 10 MG: 10 INJECTION, SOLUTION INTRAVENOUS at 12:11

## 2023-02-22 RX ADMIN — SODIUM CHLORIDE: 9 INJECTION, SOLUTION INTRAVENOUS at 09:32

## 2023-02-22 RX ADMIN — OXYCODONE 5 MG: 5 TABLET ORAL at 17:33

## 2023-02-22 RX ADMIN — PROPOFOL 125 MCG/KG/MIN: 10 INJECTION, EMULSION INTRAVENOUS at 12:49

## 2023-02-22 NOTE — PROGRESS NOTES
Cardiac Surgery Coordinator- Met with the family of Juancarlos Wells, introduced role of the Cardiac Surgery Care Coordinator. Reviewed plan of care and day of surgery expectations. Provided family with an update from OR. Encouraged family to verbalize and emotional support given. Will continue to update throughout the day. Jaime Skiff, RN    [id number] - Met with Rosanne Almonte's family and Dr. Sarah Hauser. Update given. Encouraged family to verbalize and offered emotional support. Reinforced  waiting room instructions. Family to wait in the CCU waiting room until contacted by the nursing staff.  Jaime Skiff, RN

## 2023-02-22 NOTE — Clinical Note
Sheath #1: sheath exchanged for [address] 8.5F 22MM MED [facility] -- CARTO VIZIGO. Hemostasis achieved.  Aspirated and flushed

## 2023-02-22 NOTE — CONSULTS
Pulmonary and Critical Care  Consult Note    Requesting Provider: Dr. Marika Harmon    Reason for Consult: Hybrid ablation for A.fib    HPI: The patient is a 67/F with medical history as below who we are seeing in consultation at the request of Dr. Marika Harmon for medical management post hybrid ablation for a.fib. Briefly, per notes \"Fidel Cid is a 78 y.o. male who was referred for cardiac evaluation of atrial fibrillation by Dr. Shyam Simmons. Pt has a significant PMH of afib, chronic systolic heart failure, HLD, cancer in sigmoid colon. Pt previously reported palpitations but denied during consultation with our office. Pt only reports complaints of fatigue and weight gain. Pt denies any previous ablations. He has been on eliquis and tolerating well, denies any GIB. Pt did have to previously stop his Eliquis d/t the cost.  Pt denies any previous surgeries to his chest wall. He reports that he walks 3x a week. Mild MR and mildly dilated LA noted on the most recent TTE. \"    On my arrival, he is lying comfortably on the bed. Complains of chest tightness, not pain since procedure. Also complains of difficulty breathing although the work of breathing is normal with normal saturations. Review of Systems: All other systems have been reviewed and are negative except per HPI    Past Medical History:  Past Medical History:   Diagnosis Date    Arthritis     Atrial fibrillation ([address]     Cancer ([facility] [address]     sigmoid colon    Congestive heart failure ([address]     Diabetes ([facility] [address]     prediabetic    Hypercholesterolemia     Hypertension        Social History:   reports that he has never smoked. He has never used smokeless tobacco. He reports that he does not currently use alcohol. He reports that he does not use drugs.     Family History:  Family History   Problem Relation Age of Onset    Cancer Mother     Coronary Art Dis Father     Cancer Sister     Cancer Brother     Emphysema Brother        Allergies:  No Known Allergies    Medications:  Current Facility-Administered Medications   Medication Dose Route Frequency Provider Last Rate Last Admin    rosuvastatin (CRESTOR) tablet 20 mg  20 mg Oral QHS Ade Hendricks PA        alteplase (CATHFLO) 1 mg in sterile water (preservative free) 1 mL injection  1 mg InterCATHeter PRN Ade Hendricks PA        bacitracin 500 unit/gram packet 1 Packet  1 Packet Topical PRN Ade Hendricks PA        sodium chloride (NS) flush 5-40 mL  5-40 mL IntraVENous Q8H Ade eHndricks PA   10 mL at 02/22/23 1527    sodium chloride (NS) flush 5-40 mL  5-40 mL IntraVENous PRN Ade Hendricks PA        acetaminophen (TYLENOL) tablet 650 mg  650 mg Oral Q4H Ade Hendricks PA   650 mg at 02/22/23 1520    oxyCODONE IR (ROXICODONE) tablet 5 mg  5 mg Oral Q4H PRN Ade Hendricks PA        oxyCODONE IR (ROXICODONE) tablet 10 mg  10 mg Oral Q4H PRN Ade Hendricks PA        morphine injection 4 mg  4 mg IntraVENous Q2H PRN Ade Hendricks PA   4 mg at 02/22/23 1521    naloxone (NARCAN) injection 0.4 mg  0.4 mg IntraVENous PRN Ade Hendricks PA        mupirocin (BACTROBAN) 2 % ointment   Both Nostrils BID Ade Hendricks PA        ceFAZolin (ANCEF) 2 g in sterile water (preservative free) 20 mL IV syringe  2 g IntraVENous Q6H Ade Hendricks PA   2 g at 02/22/23 1520    ondansetron (ZOFRAN) injection 4 mg  4 mg IntraVENous Q4H PRN Ade Hendricks PA   4 mg at 02/22/23 1520    albuterol (PROVENTIL VENTOLIN) nebulizer solution 2.5 mg  2.5 mg Nebulization Q4H PRN Ade Hendricks PA        chlorhexidine (PERIDEX) 0.12 % mouthwash 10 mL  10 mL Oral Q12H Ade Hendricks PA        [START ON 2/23/2023] famotidine (PEPCID) tablet 20 mg  20 mg Oral Q12H Ade Hendricks [place]        [START ON 2/23/2023] magnesium oxide (MAG-OX) tablet 400 mg  400 mg Oral BID Ade Hendricks PA        bisacodyL (DULCOLAX) suppository 10 mg  10 mg Rectal DAILY PRN Ruthie SIERRA Culver        [START ON 2/23/2023] senna-docusate (PERICOLACE) 8.6-50 mg per tablet 1 Tablet  1 Tablet Oral BID Ade Hendricks PA        [START ON 2/23/2023] polyethylene glycol (MIRALAX) packet 17 g  17 g Oral DAILY Ade Hendricks PA        melatonin tablet 3-6 mg  3-6 mg Oral QHS PRN Ade Hendricks PA        sodium chloride (NS) flush 5-40 mL  5-40 mL IntraVENous Q8H Chuck Tarango MD   10 mL at 02/22/23 1527    sodium chloride (NS) flush 5-40 mL  5-40 mL IntraVENous PRN Chuck Tarango MD        amiodarone (CORDARONE) tablet 200 mg  200 mg Oral BID Chuck Tarango MD        methylPREDNISolone (PF) (Solu-MEDROL) injection 125 mg  125 mg IntraVENous Q12H Mirtha Conti MD           Vital Signs:  Visit Vitals  /84   Pulse 77   Temp 97.7 °F (36.5 °C)   Resp 13   Ht 5' 8\" (1.727 m)   Wt 81.7 kg (180 lb 1.9 oz)   SpO2 98%   BMI 27.39 kg/m²         Intake and Output:     Intake/Output Summary (Last 24 hours) at 2/22/2023 1544  Last data filed at 2/22/2023 1533  Gross per 24 hour   Intake 1020 ml   Output 440 ml   Net 580 ml       Physical exam:   GEN: Sitting comfortably on the bed, not in acute distress. HEENT: anicteric sclerae, pink conj. NECK: Supple, -LAD, no neck mass  CV: no murmurs noted. LUNGS: Coarse BS at bases, otherwise no wheezes, rhonchi, rales  ABD: soft, non-tender, no masses  EXT: No cyanosis, distal pulses palpable, no edema  SKIN: warm, dry and intact. NEURO: Alert, awake and oriented x 3. No focal deficits.        Lab/Diagnostic Studies:    Recent Results (from the past 24 hour(s))   PROTHROMBIN TIME + INR    Collection Time: 02/22/23  7:39 AM   Result Value Ref Range    INR 1.0 0.9 - 1.1      Prothrombin time 10.5 9.0 - 11.1 sec   GLUCOSE, POC    Collection Time: 02/22/23  7:46 AM   Result Value Ref Range    Glucose (POC) 111 65 - 117 mg/dL    Performed by Papo Sommer    POC ACTIVATED CLOTTING TIME    Collection Time: 02/22/23  1:32 PM   Result Value Ref Range    Activated Clotting Time (POC) 251 (H) 79 - 138 SECS   POC ACTIVATED CLOTTING TIME    Collection Time: 02/22/23  1:53 PM   Result Value Ref Range    Activated Clotting Time (POC) 287 (H) 79 - 138 SECS   POC ACTIVATED CLOTTING TIME    Collection Time: 02/22/23  2:19 PM   Result Value Ref Range    Activated Clotting Time (POC) 113 79 - 138 SECS   EKG, 12 LEAD, INITIAL    Collection Time: 02/22/23  3:56 PM   Result Value Ref Range    Ventricular Rate 83 BPM    Atrial Rate 83 BPM    P-R Interval 164 ms    QRS Duration 96 ms    Q-T Interval 400 ms    QTC Calculation (Bezet) 470 ms    Calculated P Axis 74 degrees    Calculated R Axis 34 degrees    Calculated T Axis 113 degrees    Diagnosis       Normal sinus rhythm  Possible Left atrial enlargement  Nonspecific T wave abnormality  Prolonged QT  Abnormal ECG  When compared with ECG of 15-FEB-2023 13:20,  Sinus rhythm has replaced Atrial fibrillation               Assessment     The patient is a 67/F with medical history as below who we are seeing in consultation at the request of Dr. Sarah Hauser for medical management post hybrid ablation for a.fib. Persistent a.fib - S/p hybrid ablation. Sinus rhythm. Chest tightness/SOB - will obtain EKG, troponin and chest x-ray. Chronic systolic heart failure, NYHA class I: LVEF 40-45% on most recent TTE. He was on Toprol and Lisinopril    HTN: on Toprol 50mg and Lisinopril 2.5 mg, restart home medications if okay with CT surgery team.    HLD: On rosuvastatin at home. CCM time - 45 minutes. Full code.      Renuka Rosario MD, LINO, FCCP, FCCM, ATSF, FACP, [place]  Interventional Pulmonology/Critical [address] [place] [address]

## 2023-02-22 NOTE — ANESTHESIA PROCEDURE NOTES
Central Line Placement    Start time: 2/22/2023 8:13 AM  End time: 2/22/2023 8:25 AM  Performed by: Sofy Harden MD  Authorized by: Sofy Harden MD     Indications: vascular access  Preanesthetic Checklist: patient identified, risks and benefits discussed, anesthesia consent, site marked, patient being monitored, timeout performed and fire risk safety assessment completed and verbalized    Timeout Time: 08:13 EST       Pre-procedure: All elements of maximal sterile barrier technique followed?  Yes    2% Chlorhexidine for cutaneous antisepsis, Hand hygiene performed prior to catheter insertion and Ultrasound guidance    Sterile Ultrasound Technique followed?: Yes            Procedure:   Prep:  ChloraPrep  Location: internal jugular  Orientation:  Right  Patient position:  Trendelenburg  Catheter type:  Triple lumen  Catheter size:  7 Fr  Catheter length:  16 cm  Number of attempts:  1  Successful placement: Yes      Assessment:   Post-procedure:  Catheter secured, sterile dressing applied and sterile dressing with CHG applied  Assessment:  Blood return through all ports, placement verified by x-ray, guidewire removal verified and free fluid flow  Insertion:  Uncomplicated  Patient tolerance:  Patient tolerated the procedure well with no immediate complications

## 2023-02-22 NOTE — ANESTHESIA PREPROCEDURE EVALUATION
Relevant Problems   No relevant active problems       Anesthetic History   No history of anesthetic complications            Review of Systems / Medical History  Patient summary reviewed, nursing notes reviewed and pertinent labs reviewed    Pulmonary  Within defined limits                 Neuro/Psych   Within defined limits           Cardiovascular    Hypertension      CHF  Dysrhythmias : atrial fibrillation  Hyperlipidemia    Exercise tolerance: >4 METS  Comments: EF 40-45%   GI/Hepatic/Renal         Renal disease: CRI       Endo/Other    Diabetes: type 2    Arthritis and cancer (h/o colon cancer)     Other Findings              Physical Exam    Airway  Mallampati: II  TM Distance: 4 - 6 cm  Neck ROM: normal range of motion   Mouth opening: Normal     Cardiovascular    Rhythm: irregular  Rate: normal         Dental    Dentition: Upper dentition intact and Lower dentition intact     Pulmonary  Breath sounds clear to auscultation               Abdominal  GI exam deferred       Other Findings            Anesthetic Plan    ASA: 3  Anesthesia type: general    Monitoring Plan: Arterial line, CVP and SHAHRAM      Induction: Intravenous  Anesthetic plan and risks discussed with: Patient

## 2023-02-22 NOTE — H&P
Date of Surgery Update:  Mehdi Acevedo was seen and examined. History and physical has been reviewed. The patient has been examined.  There have been no significant clinical changes since the completion of the originally dated History and Physical.    Signed By: SIERRA Acosta     February 22, 2023 8:20 AM

## 2023-02-22 NOTE — Clinical Note
TRANSFER - IN REPORT:     Verbal report received from: Fanta Candelaria. Report consisted of patient's Situation, Background, Assessment and   Recommendations(SBAR). Opportunity for questions and clarification was provided. Assessment completed upon patient's arrival to unit and care assumed. Patient transported with a Registered Nurse, Monitor and Oxygen. Oxygen used for patient = non-rebreather mask, @ 8 - 10 Liters.

## 2023-02-22 NOTE — Clinical Note
Transseptal Cath Performed under hemodynamic and ICE, utilizing a standard needle, [place] 98cm via a guiding sheath. Needle removed.  visigo sheath advanced transseptal and connected to flush line

## 2023-02-22 NOTE — ANESTHESIA PROCEDURE NOTES
SHAHRAM  Date/Time: 2/22/2023 12:53 PM      Procedure Details: probe placement, image aquisition & interpretation    Risks and benefits discussed with the patient and plans are to proceed    Procedure Note    Performed by: Vicky Dandy, MD  Authorized by: Vicky Dandy, MD     Indications: assessment of surgical repair  Modalities: 2D, CF  Probe Type: multiplane  Insertion: atraumatic  Patient Status: intubated and sedated  Post Intervention Follow-up Study         Valve  Function  Regurgitation  Area    Aortic       Mitral       Tricuspid       Prosthetic        Complications: None  Comments: Limited 2D SHAHRAM to evaluate for ENRIQUETA thrombus and ENRIQUETA clip placement. No ENRIQUETA thrombus. ENRIQUETA clip deployed x 2 clips. After the second clip there is 5-7mm outpouching at the location of the native ENRIQUETA ostia.

## 2023-02-22 NOTE — BRIEF OP NOTE
BRIEF OP NOTE  Pre-Op Diagnosis: ATRIAL FIBRILLATION    Post-Op Diagnosis: ATRIAL FIBRILLATION      Procedure:  TRANSPERICARDIAL HYBRID ABLATION   LEFT ATRIAL APPENDAGE CLIP VIA LEFT VIDEO ASSISTED THORACIC SURGERY  Cardioversion    Surgeon: Crystal Muñiz MD    Assistant(s): Haroon Christopher PA-C    Anesthesia: General     Infusions: precedex, alec    Estimated Blood Loss:  100ml    Drains: 2 page drains    Complications: None    Findings: Persistent AF    Implants:   Implant Name Type Inv.  Item Serial No.  Lot No. LRB No. Used Action   DEVICE OCCL CLP L45MM PRELD FOR THORACOSCOPIC PROC GILLINOV - SNA  DEVICE OCCL CLP L45MM PRELD FOR THORACOSCOPIC PROC GILLINOV NA [employer]WD [id number] N/A 1 Implanted   DEVICE OCCL CLP L50MM ENRIQUETA EXCLUSION SYS ATRICLP PRO V - SNA  DEVICE OCCL CLP L50MM ENRIQUETA EXCLUSION SYS ATRICLP PRO V NA [employer]WD [id number] N/A 1 Implanted

## 2023-02-22 NOTE — PERIOP NOTES
1012:  Patient cardioverted from a-fib at 200 J into NSR rate of 65    1017:  MAYTE Hernandez RN updated on start of case to inform family. 1019:  Ep lab aware of start of case    1124:  EP lab updated on start of ablation    1131:  MAYTE Hernandez, RN updated on progression of case. 1225:  Cardioversion 200 J    1309:  TRANSFER - OUT REPORT:    Verbal report given to EP lab/ RIANA Anderson on OBOOK  being transferred to EP/CCU for routine post - op       Report consisted of patients Situation, Background, Assessment and   Recommendations(SBAR). Information from the following report(s) SBAR, OR Summary, Procedure Summary, and Intake/Output was reviewed with the receiving nurse. Lines:   Triple Lumen 02/22/23 Right (Active)       Peripheral IV 02/22/23 Left;Posterior Hand (Active)   Site Assessment Clean, dry, & intact 02/22/23 0738   Phlebitis Assessment 0 02/22/23 0738   Dressing Status Clean, dry, & intact 02/22/23 0738   Dressing Type Transparent;Tape 02/22/23 0738   Hub Color/Line Status Infusing;Pink 02/22/23 0738       Arterial Line 02/22/23 Right Radial artery (Active)        Opportunity for questions and clarification was provided.       Patient transported with:   Monitor  O2 @ 10 liters  Registered Nurse  Debi  CRNA

## 2023-02-22 NOTE — Clinical Note
TRANSFER - OUT REPORT:     Verbal report given to: Jessenia Clarke RN. Report consisted of patient's Situation, Background, Assessment and   Recommendations(SBAR). Opportunity for questions and clarification was provided. Patient transported with a Registered Nurse, Monitor and Oxygen. Oxygen used for patient = Simple Mask, @ 8 - 10 Liters. Patient transported to: CCU.

## 2023-02-22 NOTE — PROGRESS NOTES
1515- TRANSFER - IN REPORT:    Verbal report received from [address] RN(name) on Luisa Ribeiro  being received from [facility] OR(unit) for routine post - op      Report consisted of patients Situation, Background, Assessment and   Recommendations(SBAR). Information from the following report(s) SBAR, Kardex, Intake/Output, Cardiac Rhythm NSR, and Alarm Parameters  was reviewed with the receiving nurse. Opportunity for questions and clarification was provided. Assessment completed upon patients arrival to unit and care assumed. Admission assessment completed; see flow sheet for details. Pt is drowsy; however arousable to voice; once awaken able to follow commands. Currently in NSR; BP stable, peripheral pulses palpable. Lungs are clear; diminished in the bases; currently on a non-rebreather; will transition to a NC. ABD is semi-soft; BS hypoactive; currently NPO, will advance diet as tolerated. Cantu catheter in place; adequate UOP. Skin is warm and dry. Surgical incisional sites as CDI with skin glue in place. Bilateral groin sites are CDI; no palpable hematoma. Pt bathed and repositioned for comfort. 1620- Pt's spouse at the bedside; verbal update given     1640- Dr. Callie Alvarezs at the bedside; pt voicing a complaint of chest discomfort/pressure. Orders placed for Troponin. EKG, CBC & BMP all recently obtained. Awaiting CXR. Pt reports pain is \"when I take a deep breath\"; will give PRN pain medication     1725- Spoke with Dr. Reymundo Ferreira; verbal order for Lidocaine patches. Made aware of troponin results; no additional orders at this time. Pt made aware    8588- Pt tolerated clear liquid diet without incident. PRN pain control given     1940-Bedside and Verbal shift change report given to [address] (oncoming nurse) by Maria Elena Richard RN (offgoing nurse). Report included the following information SBAR, Kardex, Intake/Output, Recent Results, Cardiac Rhythm NSR, and Alarm Parameters .

## 2023-02-22 NOTE — ANESTHESIA PROCEDURE NOTES
[facility]    Brief Operative Note    Pre-operative diagnosis: Diverticulitis [K57.92]  Post-operative diagnosis Same as pre-operative diagnosis    Procedure: Procedure(s):  ROBOTIC ASSISTED SIGMOID COLECTOMY. MOBILIZATION OF SPLENIC FLESURE  Surgeon: Surgeon(s) and Role:     * Travis Brand MD - Primary     * Lawanda Stroud PA-C - Assisting     * Dorothea García MD - Fellow - Assisting  Anesthesia: General   Estimated blood loss: 50mL  Drains: None  Specimens:   ID Type Source Tests Collected by Time Destination   A : ANASTAMOTIC RING Tissue Large Intestine, Sigmoid SURGICAL PATHOLOGY EXAM Travis Brand MD 11/26/2019  9:48 AM    B : SIGMOID COLON Tissue Large Intestine, Sigmoid SURGICAL PATHOLOGY EXAM Travis Brand MD 11/26/2019  9:58 AM      Findings:   None.  Complications: None.  Implants: * No implants in log *     Condition on discharge from OR: Satisfactory    Lawanda Stroud PA-C   [facility]   [phone].      ADDENDUM:    PATIENT DATA  Indicate Y or N:  Home O2 No  Hemodialysis  No  Transplant patient  No  Cirrhosis  No  Steroids in last 30 days  No  Immunomodulators in last 30 days  No  Anticoagulation at time of surgery  No  Prior abdominal surgery  No  Pelvic irradiation  No    Albumin within 30 days if known Unknown   Hgb within 30 days if known    Hemoglobin   Date Value Ref Range Status   11/26/2019 15.0 13.3 - 17.7 g/dL Final   ]  Cr within 30 days if known    Creatinine   Date Value Ref Range Status   11/26/2019 0.91 0.66 - 1.25 mg/dL Final   ]  Body mass index is 35.32 kg/m .      OR DATA  Emergent  No   <24 hours  No   <1 week  No  Bowel Prep Yes  Antibiotics  Yes  DVT prophylaxis    Heparin  Yes   SCD  Yes   None  No  Drain  No  ASA (1,2,3,4) 2  OR time (min) 210  Stents  No  Transfuse >/= 2U  No  Anastomosis   Stapled  Yes   Handsewn  No  Leak Test    Positive  No   Negative  Yes   Not done  No    Route (Have a good  Arterial Line Placement    Start time: 2/22/2023 8:13 AM  End time: 2/22/2023 8:18 AM  Performed by: Magalys Jackson CRNA  Authorized by:  Annette Henderson MD     Pre-Procedure  Indications:  Arterial pressure monitoring and blood sampling  Preanesthetic Checklist: patient identified, risks and benefits discussed, anesthesia consent, site marked, patient being monitored, timeout performed and patient being monitored    Timeout Time: 08:13 EST      Procedure:   Prep:  ChloraPrep  Seldinger Technique?: Yes    Orientation:  Right  Location:  Radial artery  Catheter size:  20 G  Number of attempts:  1  Cont Cardiac Output Sensor: Yes      Assessment:   Post-procedure:  Line secured and sterile dressing applied  Patient Tolerance:  Patient tolerated the procedure well with no immediate complications day)

## 2023-02-22 NOTE — ANESTHESIA PROCEDURE NOTES
Peripheral Block    Start time: 2/22/2023 12:35 PM  End time: 2/22/2023 12:42 PM  Performed by: Jennifer Willams MD  Authorized by: Jennifer Willams MD       Pre-procedure: Indications: at surgeon's request and post-op pain management    Preanesthetic Checklist: patient identified, risks and benefits discussed, site marked, timeout performed, anesthesia consent given, patient being monitored and fire risk safety assessment completed and verbalized      Block Type:   Block Type:  Erector spinae  Laterality:  Left  Monitoring:  Standard ASA monitoring, continuous pulse ox, frequent vital sign checks, heart rate and oxygen  Injection Technique:  Single shot  Procedures: ultrasound guided    Patient Position: right lateral decubitus  Prep: chlorhexidine    : Lateral thoracic spine ~T7.   Needle Type:  Stimuplex  Needle Gauge:  21 G  Needle Localization:  Ultrasound guidance  Medication Injected:  Bupivacaine (PF) (MARCAINE) 0.5% injection - Peripheral Nerve Block   20 mL - 2/22/2023 12:42:00 PM  Med Admin Time: 2/22/2023 12:42 PM    Assessment:  Number of attempts:  1  Injection Assessment:  Incremental injection every 5 mL, negative aspiration for CSF, no intravascular symptoms, negative aspiration for blood and local visualized surrounding nerve on ultrasound  Patient tolerance:  Patient tolerated the procedure well with no immediate complications

## 2023-02-23 ENCOUNTER — APPOINTMENT (OUTPATIENT)
Dept: GENERAL RADIOLOGY | Age: 68
DRG: 274 | End: 2023-02-23
Attending: PHYSICIAN ASSISTANT
Payer: MEDICARE

## 2023-02-23 LAB
ALBUMIN SERPL-MCNC: 3.2 G/DL (ref 3.5–5)
ALBUMIN/GLOB SERPL: 0.9 (ref 1.1–2.2)
ALP SERPL-CCNC: 55 U/L (ref 45–117)
ALT SERPL-CCNC: 24 U/L (ref 12–78)
ANION GAP SERPL CALC-SCNC: 5 MMOL/L (ref 5–15)
AST SERPL-CCNC: 38 U/L (ref 15–37)
BILIRUB SERPL-MCNC: 1.4 MG/DL (ref 0.2–1)
BUN SERPL-MCNC: 19 MG/DL (ref 6–20)
BUN/CREAT SERPL: 15 (ref 12–20)
CALCIUM SERPL-MCNC: 8.6 MG/DL (ref 8.5–10.1)
CHLORIDE SERPL-SCNC: 104 MMOL/L (ref 97–108)
CO2 SERPL-SCNC: 25 MMOL/L (ref 21–32)
CREAT SERPL-MCNC: 1.31 MG/DL (ref 0.7–1.3)
ERYTHROCYTE [DISTWIDTH] IN BLOOD BY AUTOMATED COUNT: 12.9 % (ref 11.5–14.5)
GLOBULIN SER CALC-MCNC: 3.7 G/DL (ref 2–4)
GLUCOSE SERPL-MCNC: 195 MG/DL (ref 65–100)
HCT VFR BLD AUTO: 41.8 % (ref 36.6–50.3)
HGB BLD-MCNC: 13.8 G/DL (ref 12.1–17)
MAGNESIUM SERPL-MCNC: 2.1 MG/DL (ref 1.6–2.4)
MCH RBC QN AUTO: 30.5 PG (ref 26–34)
MCHC RBC AUTO-ENTMCNC: 33 G/DL (ref 30–36.5)
MCV RBC AUTO: 92.5 FL (ref 80–99)
NRBC # BLD: 0 K/UL (ref 0–0.01)
NRBC BLD-RTO: 0 PER 100 WBC
PLATELET # BLD AUTO: 146 K/UL (ref 150–400)
PMV BLD AUTO: 11.6 FL (ref 8.9–12.9)
POTASSIUM SERPL-SCNC: 4.4 MMOL/L (ref 3.5–5.1)
PROT SERPL-MCNC: 6.9 G/DL (ref 6.4–8.2)
RBC # BLD AUTO: 4.52 M/UL (ref 4.1–5.7)
SODIUM SERPL-SCNC: 134 MMOL/L (ref 136–145)
TROPONIN I SERPL HS-MCNC: 2699 NG/L (ref 0–76)
WBC # BLD AUTO: 17.6 K/UL (ref 4.1–11.1)

## 2023-02-23 PROCEDURE — 74011250637 HC RX REV CODE- 250/637: Performed by: INTERNAL MEDICINE

## 2023-02-23 PROCEDURE — 74011250636 HC RX REV CODE- 250/636: Performed by: NURSE PRACTITIONER

## 2023-02-23 PROCEDURE — 74011250637 HC RX REV CODE- 250/637: Performed by: NURSE PRACTITIONER

## 2023-02-23 PROCEDURE — 74011250637 HC RX REV CODE- 250/637: Performed by: THORACIC SURGERY (CARDIOTHORACIC VASCULAR SURGERY)

## 2023-02-23 PROCEDURE — 77010033678 HC OXYGEN DAILY

## 2023-02-23 PROCEDURE — 74011250637 HC RX REV CODE- 250/637: Performed by: PHYSICIAN ASSISTANT

## 2023-02-23 PROCEDURE — 85027 COMPLETE CBC AUTOMATED: CPT

## 2023-02-23 PROCEDURE — 74011000250 HC RX REV CODE- 250: Performed by: PHYSICIAN ASSISTANT

## 2023-02-23 PROCEDURE — 74011250636 HC RX REV CODE- 250/636: Performed by: PHYSICIAN ASSISTANT

## 2023-02-23 PROCEDURE — 74011000250 HC RX REV CODE- 250: Performed by: NURSE PRACTITIONER

## 2023-02-23 PROCEDURE — 83735 ASSAY OF MAGNESIUM: CPT

## 2023-02-23 PROCEDURE — 80053 COMPREHEN METABOLIC PANEL: CPT

## 2023-02-23 PROCEDURE — 93005 ELECTROCARDIOGRAM TRACING: CPT

## 2023-02-23 PROCEDURE — 71045 X-RAY EXAM CHEST 1 VIEW: CPT

## 2023-02-23 PROCEDURE — 51798 US URINE CAPACITY MEASURE: CPT

## 2023-02-23 PROCEDURE — 84484 ASSAY OF TROPONIN QUANT: CPT

## 2023-02-23 PROCEDURE — 74011000250 HC RX REV CODE- 250: Performed by: INTERNAL MEDICINE

## 2023-02-23 PROCEDURE — 71046 X-RAY EXAM CHEST 2 VIEWS: CPT

## 2023-02-23 PROCEDURE — 65270000046 HC RM TELEMETRY

## 2023-02-23 PROCEDURE — 36415 COLL VENOUS BLD VENIPUNCTURE: CPT

## 2023-02-23 PROCEDURE — 74011250636 HC RX REV CODE- 250/636: Performed by: THORACIC SURGERY (CARDIOTHORACIC VASCULAR SURGERY)

## 2023-02-23 RX ORDER — ACETAMINOPHEN 500 MG
1000 TABLET ORAL EVERY 6 HOURS
Qty: 32 TABLET | Refills: 0 | Status: SHIPPED | COMMUNITY
Start: 2023-02-23

## 2023-02-23 RX ORDER — SODIUM CHLORIDE 0.9 % (FLUSH) 0.9 %
5-40 SYRINGE (ML) INJECTION EVERY 8 HOURS
Status: DISCONTINUED | OUTPATIENT
Start: 2023-02-23 | End: 2023-02-24 | Stop reason: HOSPADM

## 2023-02-23 RX ORDER — LANOLIN ALCOHOL/MO/W.PET/CERES
3-6 CREAM (GRAM) TOPICAL
Status: SHIPPED | COMMUNITY
Start: 2023-02-23 | End: 2023-03-02 | Stop reason: ALTCHOICE

## 2023-02-23 RX ORDER — METOPROLOL TARTRATE 25 MG/1
25 TABLET, FILM COATED ORAL EVERY 12 HOURS
Status: DISCONTINUED | OUTPATIENT
Start: 2023-02-23 | End: 2023-02-24 | Stop reason: HOSPADM

## 2023-02-23 RX ORDER — MORPHINE SULFATE 2 MG/ML
2 INJECTION, SOLUTION INTRAMUSCULAR; INTRAVENOUS
Status: DISCONTINUED | OUTPATIENT
Start: 2023-02-23 | End: 2023-02-24 | Stop reason: HOSPADM

## 2023-02-23 RX ORDER — SODIUM CHLORIDE 0.9 % (FLUSH) 0.9 %
5-40 SYRINGE (ML) INJECTION AS NEEDED
Status: DISCONTINUED | OUTPATIENT
Start: 2023-02-23 | End: 2023-02-24 | Stop reason: HOSPADM

## 2023-02-23 RX ADMIN — METOPROLOL TARTRATE 25 MG: 25 TABLET, FILM COATED ORAL at 09:05

## 2023-02-23 RX ADMIN — Medication 400 MG: at 18:58

## 2023-02-23 RX ADMIN — AMIODARONE HYDROCHLORIDE 200 MG: 200 TABLET ORAL at 09:05

## 2023-02-23 RX ADMIN — WATER 2 G: 1 INJECTION INTRAMUSCULAR; INTRAVENOUS; SUBCUTANEOUS at 22:47

## 2023-02-23 RX ADMIN — SODIUM CHLORIDE, PRESERVATIVE FREE 10 ML: 5 INJECTION INTRAVENOUS at 05:08

## 2023-02-23 RX ADMIN — WATER 2 G: 1 INJECTION INTRAMUSCULAR; INTRAVENOUS; SUBCUTANEOUS at 16:42

## 2023-02-23 RX ADMIN — AMIODARONE HYDROCHLORIDE 200 MG: 200 TABLET ORAL at 18:57

## 2023-02-23 RX ADMIN — METHYLPREDNISOLONE SODIUM SUCCINATE 125 MG: 125 INJECTION, POWDER, FOR SOLUTION INTRAMUSCULAR; INTRAVENOUS at 22:42

## 2023-02-23 RX ADMIN — POLYETHYLENE GLYCOL 3350 17 G: 17 POWDER, FOR SOLUTION ORAL at 09:06

## 2023-02-23 RX ADMIN — MUPIROCIN: 20 OINTMENT TOPICAL at 18:00

## 2023-02-23 RX ADMIN — SODIUM CHLORIDE, PRESERVATIVE FREE 10 ML: 5 INJECTION INTRAVENOUS at 22:48

## 2023-02-23 RX ADMIN — SENNOSIDES AND DOCUSATE SODIUM 1 TABLET: 50; 8.6 TABLET ORAL at 18:58

## 2023-02-23 RX ADMIN — CHLORHEXIDINE GLUCONATE 10 ML: 1.2 RINSE ORAL at 09:05

## 2023-02-23 RX ADMIN — ACETAMINOPHEN 1000 MG: 500 TABLET ORAL at 04:50

## 2023-02-23 RX ADMIN — CHLORHEXIDINE GLUCONATE 10 ML: 1.2 RINSE ORAL at 21:00

## 2023-02-23 RX ADMIN — SODIUM CHLORIDE, PRESERVATIVE FREE 10 ML: 5 INJECTION INTRAVENOUS at 16:45

## 2023-02-23 RX ADMIN — METOPROLOL TARTRATE 25 MG: 25 TABLET, FILM COATED ORAL at 22:42

## 2023-02-23 RX ADMIN — ACETAMINOPHEN 1000 MG: 500 TABLET ORAL at 14:17

## 2023-02-23 RX ADMIN — SENNOSIDES AND DOCUSATE SODIUM 1 TABLET: 50; 8.6 TABLET ORAL at 09:05

## 2023-02-23 RX ADMIN — OXYCODONE 5 MG: 5 TABLET ORAL at 04:50

## 2023-02-23 RX ADMIN — WATER 2 G: 1 INJECTION INTRAMUSCULAR; INTRAVENOUS; SUBCUTANEOUS at 10:00

## 2023-02-23 RX ADMIN — FAMOTIDINE 20 MG: 20 TABLET, FILM COATED ORAL at 22:42

## 2023-02-23 RX ADMIN — WATER 2 G: 1 INJECTION INTRAMUSCULAR; INTRAVENOUS; SUBCUTANEOUS at 04:51

## 2023-02-23 RX ADMIN — METHYLPREDNISOLONE SODIUM SUCCINATE 125 MG: 125 INJECTION, POWDER, FOR SOLUTION INTRAMUSCULAR; INTRAVENOUS at 09:05

## 2023-02-23 RX ADMIN — ACETAMINOPHEN 1000 MG: 500 TABLET ORAL at 18:58

## 2023-02-23 RX ADMIN — FAMOTIDINE 20 MG: 20 TABLET, FILM COATED ORAL at 09:05

## 2023-02-23 RX ADMIN — ROSUVASTATIN 20 MG: 20 TABLET, FILM COATED ORAL at 22:42

## 2023-02-23 RX ADMIN — MUPIROCIN: 20 OINTMENT TOPICAL at 09:05

## 2023-02-23 NOTE — PROGRESS NOTES
1900: Bedside report received from Tawanda Moore [facility]: assessment completed. Pt denies pain but says he can't take a deep breath. Repositioned for comfort. 2250: pt calling out complaining about inability to breath. Oxygen sats maintaining 96-97% on 2L NC. Pt repositioned in bed. PRN morphine and scheduled tylenol given. Encouraged Incentive spirometry use. Pt able to pull 1000ml on IS.     0000: Reassessment completed. Pt resting comfortably in bed.    0450: Pt voided 75cc. Performed post void residual bladder scan showed 400cc. Ambulated pt to commode with standby assist.    Voided 350cc in commode. Ambulated to chair, CHG bath completed and bed linen changed. 0600: Pt resting in recliner at this time.  Room air.     0700: Bedside report given to RIANA Desai and RIANA Pritchard

## 2023-02-23 NOTE — OP NOTES
[address]  OPERATIVE REPORT    Name:  Pawel Sibley  MR#:  [id number]  :  1955  ACCOUNT #:  [de-identified]  DATE OF SERVICE:  2023      PREOPERATIVE DIAGNOSIS:  Longstanding persistent atrial fibrillation. POSTOPERATIVE DIAGNOSIS:  Longstanding persistent atrial fibrillation. PROCEDURES PERFORMED:  1. Left atrial appendage ligation via left VATS with #45 AtriCure clip and AtriCure PRO-V clip (CPT [id number]). 2.  Transpericardial epicardial ablation. 3.  DC cardioversion. SURGEON:  Brandy Umanzor MD    ASSISTANT:  Gomez Jaimes PA-C    ANESTHESIA:  General endotracheal.    ANESTHESIOLOGIST:  Kolton Estrada MD    COMPLICATIONS:  None. SPECIMENS REMOVED:  None. IMPLANTS:  A #45 AtriCure clip, AtriCure PRO-V clip. ESTIMATED BLOOD LOSS:  10 mL. DETAILS:  The patient is a 67-year-old gentleman who has a longstanding history of atrial fibrillation. He was referred by Dr. Tyrone Costa for hybrid ablation. PROCEDURE:  He was prepped and draped in sterile fashion. A time-out was performed. The left thorax was accessed through three separate ports. We were able to identify the phrenic nerve and go well below this in a pericardiotomy. We identified the left atrial appendage. We sized this for a size 45 clip. We placed the clip over the appendage with no issues thoracoscopically. After we fired the clip, it did not detach well from the device and the bottom half of the clip came off of the appendage. There was no hemorrhage or trauma to the patient; however, there was an incomplete closure of the appendage. We therefore came in with a different version of the AtriCure clip called a PRO-V that was able to come underneath the existing clip where it was partially in place and occlude the majority of the remainder of the appendage. We checked on SHAHRAM and this had a near complete occlusion of the left atrial appendage.   We then withdrew our camera ports and reexpanded the left lung and placed a single Ignacio drain in the left chest.  We next turned our attention to the epicardial portion of the ablation. We made a small incision over the xiphoid process. We resected the distal tip of the xiphoid. We opened the pericardium longitudinally. I then inserted the trocar and the fiberoptic camera as well as the ablation probe. We then performed a series of ablation from pulmonary vein to pulmonary vein all the way back to the pericardial reflection. We were certain we had no signal on any of our ablations. We had excellent burns all the way across. We then left a single Ignacio drain in place and tunneled it through the skin. We then closed the soft tissue in multiple layers and had hemostasis. The patient then received an erector spinae block and was transported to the EP lab for the endocardial portion of the procedure. The PA was critical for all aspects of the case including assistance with the VATS procedure and placement of the left atrial clip, exposure of the posterior left atrium and epicardium, completion of each of the ablations and closure of the wound, hemostasis and transport.         MD JOSE E Riley/[email or web address]  D:  02/22/2023 12:54  T:  02/22/2023 22:44  JOB #:  [id number]

## 2023-02-23 NOTE — DISCHARGE SUMMARY
[facility] Discharge Summary     Patient ID:  Sindhu Jenkins  [id number]  57 y.o.  1955    Admit date: 2/22/2023    Discharge date: 2/24/2023     Admitting Physician: Kindra Hernández MD     Referring Cardiologist:      PCP:  Candy Toledo DO    Admitting Diagnoses:  Persistent AF  Chronic systolic HF    Discharge Diagnoses:     Hospital Problems  Date Reviewed: 1/10/2022            Codes Class Noted POA    S/P left atrial appendage ligation ICD-10-CM: Z98.890  ICD-9-CM: V45.89  2/22/2023 Unknown    Overview Signed 2/22/2023 12:23 PM by SIERRA Modi     TRANSPERICARDIAL HYBRID ABLATION   LEFT ATRIAL APPENDAGE CLIP VIA LEFT VIDEO ASSISTED THORACIC SURGERY  Cardioversion             S/P ablation operation for arrhythmia ICD-10-CM: Z98.890, Z86.79  ICD-9-CM: V45.89  2/22/2023 Unknown    Overview Signed 2/22/2023 12:24 PM by SIERRA Modi     TRANSPERICARDIAL HYBRID ABLATION   LEFT ATRIAL APPENDAGE CLIP VIA LEFT VIDEO ASSISTED THORACIC SURGERY  Cardioversion             Atrial fibrillation [facility]) ICD-10-CM: I48.91  ICD-9-CM: 427.31  6/3/2019 Unknown           Discharged Condition: good    Disposition: home, see patient instructions for treatment and plan    Procedures for this admission:  Procedure(s):  TRANSPERICARDIAL HYBRID ABLATION WITH FLUOROSCOPY AND LEFT ATRIAL APPENDAGE CLIP VIA LEFT VIDEO ASSISTED [address], [place] BY DR CAPUTO    Discharge Medications:      My Medications        START taking these medications        Instructions Each Dose to Equal Morning Noon Evening Bedtime   acetaminophen 500 mg tablet  Commonly known as: TYLENOL    Your last dose was: Your next dose is: Take 2 Tablets by mouth every six (6) hours. 1,000 mg                 amiodarone 200 mg tablet  Commonly known as: CORDARONE    Your last dose was: Your next dose is:          Take 2 tabs by mouth twice a day for two weeks, then two tabs once a day for two weeks, then STOP  Indications: prevention of recurrent atrial fibrillation                  melatonin 3 mg tablet    Your last dose was: Your next dose is: Take 1-2 Tablets by mouth nightly as needed for Insomnia or Sleep. 3-6 mg                 methylPREDNISolone 4 mg tablet  Commonly known as: MEDROL DOSEPACK    Your last dose was: Your next dose is:         As directed. Indications: prevention of pericarditis post hybrid ablation                  oxyCODONE IR 5 mg immediate release tablet  Commonly known as: ROXICODONE    Your last dose was: Your next dose is: Take 1 Tablet by mouth every six (6) hours as needed for Pain (for pain unresponsive to Tylenol) for up to 5 days. Max Daily Amount: 20 mg.   5 mg                 polyethylene glycol 17 gram packet  Commonly known as: MIRALAX    Your last dose was: Your next dose is: Take 1 Packet by mouth daily. 17 g                 senna-docusate 8.6-50 mg per tablet  Commonly known as: PERICOLACE    Your last dose was: Your next dose is: Take 1 Tablet by mouth two (2) times a day. 1 Tablet                        CONTINUE taking these medications        Instructions Each Dose to Equal Morning Noon Evening Bedtime   apixaban 5 mg tablet  Commonly known as: ELIQUIS  Start taking on: February 25, 2023    Your last dose was: Your next dose is: Take 1 Tablet by mouth two (2) times a day. Replaces aspirin on 1/10/2022   5 mg                 metoprolol succinate 25 mg XL tablet  Commonly known as: TOPROL-XL  Start taking on: February 25, 2023    Your last dose was: Your next dose is: Take 2 Tablets by mouth nightly. 50 mg                 rosuvastatin 20 mg tablet  Commonly known as: CRESTOR    Your last dose was: Your next dose is:          Take 1 tablet by mouth nightly   20 mg                        STOP taking these medications      chlorhexidine 0.12 % solution  Commonly known as: Peridex        enoxaparin 80 mg/0.8 mL injection  Commonly known as: LOVENOX        lisinopriL 2.5 mg tablet  Commonly known as: PRINIVIL, ZESTRIL        mupirocin 2 % ointment  Commonly known as: BACTROBAN                  Where to Get Your Medications        These medications were sent to [address], [address]  [address] [facility] [address]      Phone: [phone]   amiodarone 200 mg tablet  apixaban 5 mg tablet  methylPREDNISolone 4 mg tablet  metoprolol succinate 25 mg XL tablet  oxyCODONE IR 5 mg immediate release tablet  polyethylene glycol 17 gram packet  senna-docusate 8.6-50 mg per tablet         Oxygen: N/A      Exerpted HPI by H. Jabier Dakin, NP :    David Amezcua is a 78 y.o. male who was referred for cardiac evaluation of atrial fibrillation by Dr. Oren Krabbe. Pt has a significant PMH of afib, chronic systolic heart failure, HLD, cancer in sigmoid colon. Pt previously reported palpitations but denied during consultation with our office. Pt only reports complaints of fatigue and weight gain. Pt denies any previous ablations. He has been on eliquis and tolerating well, denies any GIB. Pt did have to previously stop his Eliquis d/t the cost.  Pt denies any previous surgeries to his chest wall. He reports that he walks 3x a week. Mild MR and mildly dilated LA noted on the most recent TTE. Pt lives with is wife. He is retired. He gets around independently without assist devices. He is a nonsmoker, rarely drinks alcohol, and denies illicit drug use. Pt has a significant family history of his father  from an MI. Cardiac Testing  ECHO:  23  Interpretation Summary    Left Ventricle: Mildly reduced left ventricular systolic function with a visually estimated EF of 40 - 45%. Left ventricle size is normal. Normal wall thickness. Unable to assess wall motion. Mitral Valve: Mild regurgitation. Tricuspid Valve: Normal RVSP. The estimated RVSP is 8 mmHg.     Left Atrium: Left atrium is mildly dilated. Right Atrium: Right atrium is mildly dilated. Aorta: Normal sized aortic root. Ao Root diameter is 3.4 cm. Echo Findings  Left Ventricle Mildly reduced left ventricular systolic function with a visually estimated EF of 40 - 45%. Left ventricle size is normal. Normal wall thickness. Unable to assess wall motion. Indeterminate diastolic function due to atrial fibrillation. Left Atrium Left atrium is mildly dilated. Right Ventricle Right ventricle size is normal. Normal systolic function. TAPSE is normal. TAPSE is 2.3 cm. Right Atrium Right atrium is mildly dilated. Aortic Valve Valve structure is normal. No regurgitation. No significant stenosis. Mitral Valve Valve structure is normal. Mild regurgitation. No stenosis noted. Tricuspid Valve Valve structure is normal. Trace regurgitation. No stenosis noted. Normal RVSP. The estimated RVSP is 8 mmHg. Pulmonic Valve Valve structure is normal. Physiologically normal regurgitation. No stenosis noted. Aorta Normal sized aortic root. Ao Root diameter is 3.4 cm. Pericardium No pericardial effusion. Hospital Course: Patient underwent a TRANSPERICARDIAL HYBRID ABLATION WITH FLUOROSCOPY AND LEFT ATRIAL APPENDAGE CLIP VIA LEFT VIDEO ASSISTED THORACIC SURGERY, [place] BY DR CAPUTO on 2/23/23. He was extubated in the OR and transferred to ICU in stable condition on Precedex and Filiberto. POD#1: Doing well. Mild pain with deep inspiration. Wanting to get up and move around. BB started. Chest tubes removed. Transfer to stepdown ordered. POD#2: Doing well. Ambulating, eating, drinking. Pain controlled with Tylenol. May dc home with the following plan:     Persistent Afib s/p Hybrid ablation with LAAL: On Toprol and Eliquis PTA. Cont BB.  Will plan to resume Eliquis tomorrow as he is in NSR currently; Dr. Ericka Laurent deferred to Dr. Rosie Giles (unavailable until this afternoon) on exact timing and awaiting response; patient knows he will be informed via phone call if plan changes as I do not want to delay his discharge home. Chronic systolic heart failure, NYHA class I: LVEF 40-45% on most recent TTE. On Toprol and Lisinopril PTA; currently on BB. Clinically euvolemic but BP on soft side; will have patient cont BB at NC and then follow up with cardiology regarding ACEI. CKD stage IIIb: Creatinine 1.55 from 1.31 yesterday. BL ~1.4. Avoid nephrotoxins. No Toradol due to kidney disease. OP follow up. Elevated T bili: Elevated in December of 2022 as well; normalized this AM (0.8). Avoid hepatotoxins; OP follow up. Leukocytosis. WBC 20.2 from 17.6. On steroids. Afebrile. No S/S of infection. Continue IS, wound care, aggressive mobilization. Thrombocytopenia, mild: Platelets 572 from 950. No S/S of infection. Hyperglycemia without a history of DM: A1C 5.7. Likely a combination of steroids and post-surgical process. OP follow up. HTN: on Toprol 50mg and Lisinopril 2.5mg; BB resumed 2/23/23. Continue. Will have pt follow up about when to add back in ACEI as pressures are on the softer side. HLD: On rosuvastatin PTA, continue. Overweight: BMI 27.8. diet and exercise counseling. Postop pain control, stable: Cont scheduled Tylenol and PRN Oxycodone     Referral to outpatient cardiac rehab made.      Discharge Vital Signs: Visit Vitals  /79 (BP 1 Location: Left upper arm, BP Patient Position: Sitting)   Pulse 64   Temp 98.6 °F (37 °C)   Resp 20   Ht 5' 8\" (1.727 m)   Wt 182 lb 5.1 oz (82.7 kg)   SpO2 97%   BMI 27.72 kg/m²       Labs:   Recent Labs     02/24/23  0528 02/23/23  0445 02/22/23  1531 02/22/23  0746   WBC 20.2*   < > 15.5*  --    HGB 13.8   < > 13.1  --    HCT 43.7   < > 40.2  --    *   < > 146*  --       < > 137  --    K 4.6   < > 3.8  --    BUN 25*   < > 19  --    CREA 1.55*   < > 1.40*  --    *   < > 174*  --    GLUCPOC  --   --   --  111   INR  --   --  1.1  --     < > = values in this interval not displayed. Diagnostics:   CXR Results  (Last 48 hours)                 02/23/23 1532  XR CHEST PA LAT Final result    Impression:      No pneumothorax following chest tube and right IJ removal   Small left pleural effusion           Narrative:  EXAM: XR CHEST PA LAT       INDICATION: Post left VATS and chest tube removal       COMPARISON: 0458 hours       TECHNIQUE: PA and lateral chest views       FINDINGS: The cardiac size is within normal limits. The pulmonary vasculature is   within normal limits. The left chest tube has been removed and there is a small   amount of pleural fluid in the left posterior costophrenic angle. There is no   pneumothorax. The right IJ line has also been removed. 2 clips are noted in the   region of the left atrial appendage. . The visualized bones and upper abdomen are   age-appropriate. 02/23/23 0518  XR CHEST PORT Final result    Impression:  No acute interval change. Narrative:  EXAM:  XR CHEST PORT       INDICATION: Postop heart. COMPARISON: Chest x-ray 2/22/2023. TECHNIQUE: Upright portable chest AP view       FINDINGS: Cardiac monitoring leads are noted. Right central venous catheter and   left chest tube remain in stable and expected position. Atrial exclusion clip   devices are noted overlying left cardiac silhouette which is within normal   limits. The pulmonary vasculature is within normal limits. The lungs and pleural spaces are clear. The visualized bones and upper abdomen   are age-appropriate. 02/22/23 1633  XR CHEST PORT Final result    Impression:      No acute process on portable chest.   No pneumothorax with right IJ line and left chest tube in place           Narrative:  EXAM:  XR CHEST PORT       INDICATION: Shortness of breath       COMPARISON: 2/15/2023       TECHNIQUE: portable chest AP view at 1617 hours       FINDINGS: The cardiac silhouette is within normal limits.  The pulmonary   vasculature is within normal limits. The right IJ line extends to the cavoatrial   junction. There is a left-sided chest tube in place without a pneumothorax. The lungs and pleural spaces are clear. The visualized bones and upper abdomen   are age-appropriate. Patient Instructions/Follow Up Care:  Discharge instructions were reviewed with the patient and family present. Questions were also answered at this time. Prescriptions and medications were reviewed. The patient has a follow up appointment with the Nurse Practitioner or Physician's Assistant on 3/2/2023 2:15 PM. The patient was also instructed to follow up with his primary care physician as needed. The patient and family were encouraged to call with any questions or concerns.        Signed:  Kamron Ag NP  2/24/2023  4:54 PM

## 2023-02-23 NOTE — PROGRESS NOTES
[id number]  Pt had not voided since 0515. Bladder scan complete and revealed 500cc. Pt attempted to void and voided 100cc. Lazarus Scheuermann PA notified. Advised that pt try walking, then attempt again. Pt does not take prostate meds at home. Pt ambulated around entire CCU loop x 2 and still unable to void anymore. Transport at bedside upon return from walk and pt taken to xray via wheelchair.   Upon return to room, pt voided 425cc in urinal.

## 2023-02-23 NOTE — ANESTHESIA POSTPROCEDURE EVALUATION
Procedure(s):  TRANSPERICARDIAL HYBRID ABLATION WITH FLUOROSCOPY AND LEFT ATRIAL APPENDAGE CLIP VIA LEFT VIDEO ASSISTED THORACIC SURGERY, [place] BY DR CAPUTO. general    Anesthesia Post Evaluation      Multimodal analgesia: multimodal analgesia used between 6 hours prior to anesthesia start to PACU discharge  Patient location during evaluation: PACU  Level of consciousness: sleepy but conscious  Pain management: adequate  Airway patency: patent  Anesthetic complications: no  Cardiovascular status: acceptable  Respiratory status: acceptable  Hydration status: acceptable  Comments: +Post-Anesthesia Evaluation and Assessment    Patient: Facundo Conn MRN: [id number]  SSN: [id number]   YOB: 1955  Age: 78 y.o. Sex: male      Cardiovascular Function/Vital Signs    /68   Pulse 69   Temp 36.9 °C (98.5 °F)   Resp 13   Ht 5' 8\" (1.727 m)   Wt 81.7 kg (180 lb 1.9 oz)   SpO2 94%   BMI 27.39 kg/m²     Patient is status post Procedure(s):  TRANSPERICARDIAL HYBRID ABLATION WITH FLUOROSCOPY AND LEFT ATRIAL APPENDAGE CLIP VIA LEFT VIDEO ASSISTED THORACIC SURGERY, [place] BY DR CAPUTO. Nausea/Vomiting: Controlled. Postoperative hydration reviewed and adequate. Pain:  Pain Scale 1: Numeric (0 - 10) (02/23/23 0400)  Pain Intensity 1: 0 (02/23/23 0400)   Managed. Neurological Status:   Neuro (WDL): Within Defined Limits (02/22/23 0757)   At baseline. Mental Status and Level of Consciousness: Arousable. Pulmonary Status:   O2 Device: Nasal cannula (02/22/23 2000)   Adequate oxygenation and airway patent. Complications related to anesthesia: None    Post-anesthesia assessment completed. No concerns.     Signed By: Vamsi De Guzman MD    2/23/2023  Post anesthesia nausea and vomiting:  controlled  Final Post Anesthesia Temperature Assessment:  Normothermia (36.0-37.5 degrees C)      INITIAL Post-op Vital signs:   Vitals Value Taken Time   /75 02/23/23 0700   Temp 36.9 °C (98.5 °F) 02/23/23 0400   Pulse 73 02/23/23 0723   Resp 12 02/23/23 0723   SpO2 95 % 02/23/23 0723   Vitals shown include unvalidated device data.

## 2023-02-23 NOTE — PROGRESS NOTES
[facility] FLOOR Progress Note    Admit Date: 2023  POD: 2 Day Post-Op      Procedure:  Procedure(s):  TRANSPERICARDIAL HYBRID ABLATION WITH FLUOROSCOPY AND LEFT ATRIAL APPENDAGE CLIP VIA LEFT VIDEO ASSISTED THORACIC SURGERY, [place] BY DR Marie Reagan    Subjective/overnight events:   Pt seen with Dr. Ángel Arroyo, ambulating in room. In NSR, on RA, afebrile . VSS. No events overnight. Had some discomfort this AM from which he got relief with Tylenol . Eating, drinking, ambulating well. Lawrance Reus to go home today.       Objective:     Visit Vitals  /80   Pulse 79   Temp 98 °F (36.7 °C)   Resp 18   Ht 5' 8\" (1.727 m)   Wt 182 lb 5.1 oz (82.7 kg)   SpO2 98%   BMI 27.72 kg/m²     Temp (24hrs), Av.9 °F (36.6 °C), Min:97.6 °F (36.4 °C), Max:98.3 °F (36.8 °C)      Last 24hr Input/Output:    Intake/Output Summary (Last 24 hours) at 2023 0743  Last data filed at 2023 0527  Gross per 24 hour   Intake 440 ml   Output 2155 ml   Net -1715 ml        Chest tube output: N/A    EKG/Rhythm:   EKG Results       Procedure 720 Value Units Date/Time    EKG, 12 LEAD, INITIAL [[id number]] Collected: 23 0457    Order Status: Completed Updated: 23 1242     Ventricular Rate 82 BPM      Atrial Rate 82 BPM      P-R Interval 158 ms      QRS Duration 86 ms      Q-T Interval 410 ms      QTC Calculation (Bezet) 479 ms      Calculated P Axis 69 degrees      Calculated R Axis 25 degrees      Calculated T Axis 55 degrees      Diagnosis --     Normal sinus rhythm  Normal ECG  When compared with ECG of 2023 15:56,  Non-specific change in ST segment in Lateral leads  Nonspecific T wave abnormality no longer evident in Inferior leads  T wave inversion no longer evident in Anterior leads      EKG, 12 LEAD, INITIAL [[id number]] Collected: 23 1556    Order Status: Completed Updated: 23     Ventricular Rate 83 BPM      Atrial Rate 83 BPM      P-R Interval 164 ms      QRS Duration 96 ms      Q-T Interval 400 ms      QTC Calculation (Bezet) 470 ms      Calculated P Axis 74 degrees      Calculated R Axis 34 degrees      Calculated T Axis 113 degrees      Diagnosis --     Normal sinus rhythm  Possible Left atrial enlargement  Nonspecific T wave abnormality  Prolonged QT  When compared with ECG of 15-FEB-2023 13:20,  Sinus rhythm has replaced Atrial fibrillation  Confirmed by Hugo Gonzalez ([id number]) on 2/22/2023 8:12:27 PM              Oxygen: RA    CXR:   CXR Results  (Last 48 hours)                 02/23/23 1532  XR CHEST PA LAT Final result    Impression:      No pneumothorax following chest tube and right IJ removal   Small left pleural effusion           Narrative:  EXAM: XR CHEST PA LAT       INDICATION: Post left VATS and chest tube removal       COMPARISON: 0458 hours       TECHNIQUE: PA and lateral chest views       FINDINGS: The cardiac size is within normal limits. The pulmonary vasculature is   within normal limits. The left chest tube has been removed and there is a small   amount of pleural fluid in the left posterior costophrenic angle. There is no   pneumothorax. The right IJ line has also been removed. 2 clips are noted in the   region of the left atrial appendage. . The visualized bones and upper abdomen are   age-appropriate. 02/23/23 0518  XR CHEST PORT Final result    Impression:  No acute interval change. Narrative:  EXAM:  XR CHEST PORT       INDICATION: Postop heart. COMPARISON: Chest x-ray 2/22/2023. TECHNIQUE: Upright portable chest AP view       FINDINGS: Cardiac monitoring leads are noted. Right central venous catheter and   left chest tube remain in stable and expected position. Atrial exclusion clip   devices are noted overlying left cardiac silhouette which is within normal   limits. The pulmonary vasculature is within normal limits. The lungs and pleural spaces are clear. The visualized bones and upper abdomen   are age-appropriate.            02/22/23 1633  XR CHEST PORT Final result    Impression:      No acute process on portable chest.   No pneumothorax with right IJ line and left chest tube in place           Narrative:  EXAM:  XR CHEST PORT       INDICATION: Shortness of breath       COMPARISON: 2/15/2023       TECHNIQUE: portable chest AP view at 1617 hours       FINDINGS: The cardiac silhouette is within normal limits. The pulmonary   vasculature is within normal limits. The right IJ line extends to the cavoatrial   junction. There is a left-sided chest tube in place without a pneumothorax. The lungs and pleural spaces are clear. The visualized bones and upper abdomen   are age-appropriate. Admission Weight: Last Weight   Weight: 180 lb 1.9 oz (81.7 kg) Weight: 182 lb 5.1 oz (82.7 kg)       EXAM:  General: Awake, alert, oriented  and no acute distress   Lungs:    Diminished to ausculation bilaterally   Incision:  Incision clean, dry and intact and no erythema, drainage or swelling   Heart:   Regular rate and rhythm  and no murmurs, rubs or gallops   Abdomen:    Soft, non-distended, non-tender and active bowel sounds   Extremities:  No edema    Neurologic:  Gross motor and sensory apparatus intact         Activity: OOB TID, ambulate     Diet:   Diabetic diet    Lab Data Reviewed:   Recent Labs     02/24/23  0528 02/23/23  0445 02/22/23  1531 02/22/23  0746   WBC 20.2*   < > 15.5*  --    HGB 13.8   < > 13.1  --    HCT 43.7   < > 40.2  --    *   < > 146*  --       < > 137  --    K 4.6   < > 3.8  --    BUN 25*   < > 19  --    CREA 1.55*   < > 1.40*  --    *   < > 174*  --    GLUCPOC  --   --   --  111   INR  --   --  1.1  --     < > = values in this interval not displayed.          Assessment:     Active Problems:    Atrial fibrillation ([address] (6/3/2019)      S/P left atrial appendage ligation (2/22/2023)      Overview: TRANSPERICARDIAL HYBRID ABLATION       LEFT ATRIAL APPENDAGE CLIP VIA LEFT VIDEO ASSISTED THORACIC SURGERY Cardioversion      S/P ablation operation for arrhythmia (2/22/2023)      Overview: TRANSPERICARDIAL HYBRID ABLATION       LEFT ATRIAL APPENDAGE CLIP VIA LEFT VIDEO ASSISTED THORACIC SURGERY      Cardioversion           Plan/Recommendations/Medical Decision Making:     Persistent Afib s/p Hybrid ablation with LAAL: On Toprol and Eliquis PTA. Presently on Amio 200mg BID, solumedrol, BB. Chest tubes removed 2/23/23. Chronic systolic heart failure, NYHA class I: LVEF 40-45% on most recent TTE. On Toprol and Lisinopril PTA; currently on BB. CKD stage IIIb: Creatinine 1.55 from 1.31 yesterday. BL ~1.4. Avoid nephrotoxins. No Toradol due to kidney disease. OP follow up. Elevated T bili: Elevated in December of 2022 as well; normalized this AM (0.8). Avoid hepatotoxins; OP follow up. Leukocytosis. WBC 20.2 from 17.6. On solumedrol. Afebrile. No S/S of infection. Continue IS, wound care, aggressive mobilization. Thrombocytopenia, mild: Platelets 741 from 727. No S/S of infection. Hyperglycemia without a history of DM: A1C 5.7. Likely a combination of steroids and post-surgical process. OP follow up. HTN: on Toprol 50mg and Lisinopril 2.5mg; BB resumed 2/23/23. Continue. HLD: On rosuvastatin PTA, cont IP  Overweight: BMI 27.8. diet and exercise counseling.    Postop pain control, stable: Cont scheduled Tylenol, lidocaine patches and PRN Oxycodone     DVT ppx- SCDs  Dispo- Discharge        Signed By: Gia Reilly NP

## 2023-02-23 NOTE — PROGRESS NOTES
0700 Bedside report received from [employer], [address]. Patient is up in chair and stable. Patient states that he is not in pain and is comfortable. 0820 Clear liquid tray arrived, patient did well with it. States readiness for diet to be advanced. Order put in for Regular diet with 4 carb choices. 0930 Regular diet tray, ate about 70% of it. 1100 Patient back to bed. RIJ removed, no bleeding or any other complication.

## 2023-02-23 NOTE — PROGRESS NOTES
ICU Progress Note        Subjective: Overnight events noted. Vital Signs:    Visit Vitals  /68   Pulse 69   Temp 98.5 °F (36.9 °C)   Resp 13   Ht 5' 8\" (1.727 m)   Wt 81.7 kg (180 lb 1.9 oz)   SpO2 94%   BMI 27.39 kg/m²       O2 Device: Nasal cannula   O2 Flow Rate (L/min): 2 l/min   Temp (24hrs), Av.1 °F (36.7 °C), Min:97.7 °F (36.5 °C), Max:98.5 °F (36.9 °C)       Intake/Output:   Last shift:      No intake/output data recorded. Last 3 shifts:  1901 -  0700  In: 1920 [P.O.:840; I.V.:1080]  Out: 1360 [Urine:955; Drains:405]    Intake/Output Summary (Last 24 hours) at 2023 0800  Last data filed at 2023 0600  Gross per 24 hour   Intake 1920 ml   Output 1360 ml   Net 560 ml       Physical Exam:    General: Alert, awake and oriented. Not in acute distress  HEENT:  Anicteric sclerae; pink palpebral conjunctivae; mucosa moist  Resp:  Bilateral air entry +, no crackles or wheeze.  CHEST TUBE +  CV:  S1, S2 present  GI:  Abdomen soft, non-tender; (+) active bowel sounds  Extremities:  +2 pulses on all extremities; no edema/ cyanosis/ clubbing noted  Skin:  Warm; no rashes/ lesions noted  Neurologic:  Non-focal    DATA:     Current Facility-Administered Medications   Medication Dose Route Frequency    rosuvastatin (CRESTOR) tablet 20 mg  20 mg Oral QHS    alteplase (CATHFLO) 1 mg in sterile water (preservative free) 1 mL injection  1 mg InterCATHeter PRN    bacitracin 500 unit/gram packet 1 Packet  1 Packet Topical PRN    sodium chloride (NS) flush 5-40 mL  5-40 mL IntraVENous Q8H    sodium chloride (NS) flush 5-40 mL  5-40 mL IntraVENous PRN    oxyCODONE IR (ROXICODONE) tablet 5 mg  5 mg Oral Q4H PRN    oxyCODONE IR (ROXICODONE) tablet 10 mg  10 mg Oral Q4H PRN    morphine injection 4 mg  4 mg IntraVENous Q2H PRN    naloxone (NARCAN) injection 0.4 mg  0.4 mg IntraVENous PRN    mupirocin (BACTROBAN) 2 % ointment   Both Nostrils BID    ceFAZolin (ANCEF) 2 g in sterile water (preservative free) 20 mL IV syringe  2 g IntraVENous Q6H    ondansetron (ZOFRAN) injection 4 mg  4 mg IntraVENous Q4H PRN    albuterol (PROVENTIL VENTOLIN) nebulizer solution 2.5 mg  2.5 mg Nebulization Q4H PRN    chlorhexidine (PERIDEX) 0.12 % mouthwash 10 mL  10 mL Oral Q12H    famotidine (PEPCID) tablet 20 mg  20 mg Oral Q12H    magnesium oxide (MAG-OX) tablet 400 mg  400 mg Oral BID    bisacodyL (DULCOLAX) suppository 10 mg  10 mg Rectal DAILY PRN    senna-docusate (PERICOLACE) 8.6-50 mg per tablet 1 Tablet  1 Tablet Oral BID    polyethylene glycol (MIRALAX) packet 17 g  17 g Oral DAILY    melatonin tablet 3-6 mg  3-6 mg Oral QHS PRN    sodium chloride (NS) flush 5-40 mL  5-40 mL IntraVENous Q8H    sodium chloride (NS) flush 5-40 mL  5-40 mL IntraVENous PRN    amiodarone (CORDARONE) tablet 200 mg  200 mg Oral BID    methylPREDNISolone (PF) (Solu-MEDROL) injection 125 mg  125 mg IntraVENous Q12H    acetaminophen (TYLENOL) tablet 1,000 mg  1,000 mg Oral Q6H    lidocaine 4 % patch 2 Patch  2 Patch TransDERmal Q24H         Labs: Results:       Chemistry Recent Labs     02/23/23  0445 02/22/23  1531   * 174*   * 137   K 4.4 3.8    110*   CO2 25 22   BUN 19 19   CREA 1.31* 1.40*   CA 8.6 8.2*   AGAP 5 5   BUCR 15 14   AP 55 53   TP 6.9 6.4   ALB 3.2* 3.3*   GLOB 3.7 3.1   AGRAT 0.9* 1.1      CBC w/Diff Recent Labs     02/23/23  0445 02/22/23  1531   WBC 17.6* 15.5*   RBC 4.52 4.34   HGB 13.8 13.1   HCT 41.8 40.2   * 146*   GRANS  --  91*   LYMPH  --  6*   EOS  --  0      Coagulation Recent Labs     02/22/23  1531 02/22/23  0739   PTP 11.2* 10.5   INR 1.1 1.0   APTT 23.5  --        Liver Enzymes Recent Labs     02/23/23  0445   TP 6.9   ALB 3.2*   AP 55      ABG No results found for: PH, PHI, PCO2, PCO2I, PO2, PO2I, HCO3, HCO3I, FIO2, FIO2I   Microbiology No results for input(s): CULT in the last 72 hours.      Imaging:  CXR Results  (Last 48 hours)                 02/23/23 0518  XR CHEST PORT Final result Impression:  No acute interval change. Narrative:  EXAM:  XR CHEST PORT       INDICATION: Postop heart. COMPARISON: Chest x-ray 2/22/2023. TECHNIQUE: Upright portable chest AP view       FINDINGS: Cardiac monitoring leads are noted. Right central venous catheter and   left chest tube remain in stable and expected position. Atrial exclusion clip   devices are noted overlying left cardiac silhouette which is within normal   limits. The pulmonary vasculature is within normal limits. The lungs and pleural spaces are clear. The visualized bones and upper abdomen   are age-appropriate. 02/22/23 1633  XR CHEST PORT Final result    Impression:      No acute process on portable chest.   No pneumothorax with right IJ line and left chest tube in place           Narrative:  EXAM:  XR CHEST PORT       INDICATION: Shortness of breath       COMPARISON: 2/15/2023       TECHNIQUE: portable chest AP view at 1617 hours       FINDINGS: The cardiac silhouette is within normal limits. The pulmonary   vasculature is within normal limits. The right IJ line extends to the cavoatrial   junction. There is a left-sided chest tube in place without a pneumothorax. The lungs and pleural spaces are clear. The visualized bones and upper abdomen   are age-appropriate. CT Results  (Last 48 hours)      None                 Assessment and Plan:    The patient is a 67/F with medical history as below who we are seeing in consultation at the request of Dr. Di Valerio for medical management post hybrid ablation for a.fib. Persistent a.fib - S/p hybrid ablation. Sinus rhythm. Chest tightness/SOB - resolved. EKG showed T wave inversion and troponin was elevated to >1500 (but this was post ablation). Repeat troponin pending, more importantly his symptoms have resolved. Chest x-ray without any new infiltrate. Chronic systolic heart failure, NYHA class I: LVEF 40-45% on most recent TTE.   He was on Toprol and Lisinopril. Restart when okay with CT surgery team.      HTN: on Toprol 50mg and Lisinopril 2.5 mg, restart home medications if okay with CT surgery team.     HLD: On rosuvastatin at home. Full code.      Seamus Argueta MD,LINO, FCCM, FCCP, ATSF, FACP, DAABIP  Interventional Pulmonology/Critical [address] [place] [address]

## 2023-02-23 NOTE — PROGRESS NOTES
Interdisciplinary team rounds were held 2/23/2023 with the following team members:Care Management, Diabetes Treatment Specialist, Nursing, Pharmacy, Physician, Respiratory Therapy, and Clinical Coordinator and the patient. Plan of care discussed. See clinical pathway and/or care plan for interventions and desired outcomes.     Goals of the Day: pain control, transfer to stepdown

## 2023-02-23 NOTE — PROGRESS NOTES
[facility] ICU Progress Note    Admit Date: 2023  POD:  1 Day Post-Op    Procedure:  Procedure(s):  TRANSPERICARDIAL HYBRID ABLATION WITH FLUOROSCOPY AND LEFT ATRIAL APPENDAGE CLIP VIA LEFT VIDEO ASSISTED THORACIC SURGERY, SHAHRAM BY DR CAPUTO        Subjective:   Pt seen with Dr. Jill Moura. Pt sitting up in the chair. Doing well. Just has some mild pain with deep inspiration     Pt is ready to get up and walk around     Now on RA. Afebrile. Objective:   Vitals:  Blood pressure 108/68, pulse 69, temperature 98.5 °F (36.9 °C), resp. rate 13, height 5' 8\" (1.727 m), weight 180 lb 1.9 oz (81.7 kg), SpO2 94 %. Temp (24hrs), Av.1 °F (36.7 °C), Min:97.7 °F (36.5 °C), Max:98.5 °F (36.9 °C)    EKG/Rhythm:  NSR 60-90s    Extubation Date / Time: in OR (23 1431)    CT Output: Midchest - 105 mL total (60mL overnight)    Lateral chest - 300mL total (160mL overnight)    Oxygen Therapy:  Oxygen Therapy  O2 Sat (%): 94 % (23 06)  Pulse via Oximetry: 67 beats per minute (23 0600)  O2 Device: Nasal cannula (23)  O2 Flow Rate (L/min): 2 l/min (23)    CXR:    CXR Results  (Last 48 hours)                 23 0518  XR CHEST PORT Final result    Impression:  No acute interval change. Narrative:  EXAM:  XR CHEST PORT       INDICATION: Postop heart. COMPARISON: Chest x-ray 2023. TECHNIQUE: Upright portable chest AP view       FINDINGS: Cardiac monitoring leads are noted. Right central venous catheter and   left chest tube remain in stable and expected position. Atrial exclusion clip   devices are noted overlying left cardiac silhouette which is within normal   limits. The pulmonary vasculature is within normal limits. The lungs and pleural spaces are clear. The visualized bones and upper abdomen   are age-appropriate.            23 1633  XR CHEST PORT Final result    Impression:      No acute process on portable chest.   No pneumothorax with right IJ line and left chest tube in place           Narrative:  EXAM:  XR CHEST PORT       INDICATION: Shortness of breath       COMPARISON: 2/15/2023       TECHNIQUE: portable chest AP view at 1617 hours       FINDINGS: The cardiac silhouette is within normal limits. The pulmonary   vasculature is within normal limits. The right IJ line extends to the cavoatrial   junction. There is a left-sided chest tube in place without a pneumothorax. The lungs and pleural spaces are clear. The visualized bones and upper abdomen   are age-appropriate. Admission Weight: Last Weight   Weight: 180 lb 1.9 oz (81.7 kg) Weight: 180 lb 1.9 oz (81.7 kg)     Intake / Output / Drain:  Current Shift: No intake/output data recorded. Last 24 hrs.:   Intake/Output Summary (Last 24 hours) at 2/23/2023 0917  Last data filed at 2/23/2023 0600  Gross per 24 hour   Intake 1920 ml   Output 1360 ml   Net 560 ml       EXAM:  General:    NAD, pleasant mood                           Lungs:   Clear to auscultation bilaterally. Incision:  No erythema, drainage or swelling. Prineo dressing over incision. Drain sites clean. Heart:  Regular rate and rhythm, S1, S2 normal, no murmur, click, rub or gallop. Abdomen:   Soft, non-tender. Bowel sounds normal. No masses,  No organomegaly. Extremities:  No edema. PPP. Neurologic:  Gross motor and sensory apparatus intact. Labs:   Recent Labs     02/23/23  0445 02/22/23  1531 02/22/23  0746 02/22/23  0739   WBC 17.6* 15.5*  --    < >   HGB 13.8 13.1  --    < >   HCT 41.8 40.2  --    < >   * 146*  --    < >   * 137  --    < >   K 4.4 3.8  --    < >   BUN 19 19  --    < >   CREA 1.31* 1.40*  --    < >   * 174*  --    < >   GLUCPOC  --   --  111  --    INR  --  1.1  --   --     < > = values in this interval not displayed.         Assessment:     Active Problems:    Atrial fibrillation ([address] (6/3/2019)      S/P left atrial appendage ligation (2/22/2023)      Overview: TRANSPERICARDIAL HYBRID ABLATION       LEFT ATRIAL APPENDAGE CLIP VIA LEFT VIDEO ASSISTED THORACIC SURGERY      Cardioversion      S/P ablation operation for arrhythmia (2/22/2023)      Overview: TRANSPERICARDIAL HYBRID ABLATION       LEFT ATRIAL APPENDAGE CLIP VIA LEFT VIDEO ASSISTED THORACIC SURGERY      Cardioversion       Plan/Recommendations/Medical Decision Making:     Persistent Afib s/p Hybrid ablation with LAAL: On Toprol and Eliquis PTA. Presently on Amio 200mg BID, solumedrol. Start short acting Metoprolol this am, monitor BP  Chronic systolic heart failure, NYHA class I: LVEF 40-45% on most recent TTE. On Toprol and Lisinopril. Start BB this am  HTN: on Toprol 50mg and Lisinopril 2.5mg, start BB this am.   HLD: On rosuvastatin PTA, cont IP  5. Overweight: BMI 27.8. diet and exercise counseling. 6.  Postop pain control: Increase tylenol to 1,000mg Q6hr. Add lidocaine patches. Cont PRN Oxycodone      [facility], ambulating TID. Reassess CT drainage this afternoon.      Signed By: Yvonnie Dakin, NP

## 2023-02-24 VITALS
WEIGHT: 182.32 LBS | RESPIRATION RATE: 20 BRPM | BODY MASS INDEX: 27.63 KG/M2 | HEIGHT: 68 IN | SYSTOLIC BLOOD PRESSURE: 111 MMHG | OXYGEN SATURATION: 97 % | DIASTOLIC BLOOD PRESSURE: 65 MMHG | HEART RATE: 69 BPM | TEMPERATURE: 98.6 F

## 2023-02-24 LAB
ALBUMIN SERPL-MCNC: 3.1 G/DL (ref 3.5–5)
ALBUMIN/GLOB SERPL: 0.8 (ref 1.1–2.2)
ALP SERPL-CCNC: 49 U/L (ref 45–117)
ALT SERPL-CCNC: 18 U/L (ref 12–78)
ANION GAP SERPL CALC-SCNC: 9 MMOL/L (ref 5–15)
AST SERPL-CCNC: 43 U/L (ref 15–37)
ATRIAL RATE: 82 BPM
BILIRUB SERPL-MCNC: 0.8 MG/DL (ref 0.2–1)
BUN SERPL-MCNC: 25 MG/DL (ref 6–20)
BUN/CREAT SERPL: 16 (ref 12–20)
CALCIUM SERPL-MCNC: 8.7 MG/DL (ref 8.5–10.1)
CALCULATED P AXIS, ECG09: 69 DEGREES
CALCULATED R AXIS, ECG10: 25 DEGREES
CALCULATED T AXIS, ECG11: 55 DEGREES
CHLORIDE SERPL-SCNC: 107 MMOL/L (ref 97–108)
CO2 SERPL-SCNC: 21 MMOL/L (ref 21–32)
CREAT SERPL-MCNC: 1.55 MG/DL (ref 0.7–1.3)
DIAGNOSIS, 93000: NORMAL
ERYTHROCYTE [DISTWIDTH] IN BLOOD BY AUTOMATED COUNT: 13.1 % (ref 11.5–14.5)
GLOBULIN SER CALC-MCNC: 4.1 G/DL (ref 2–4)
GLUCOSE SERPL-MCNC: 164 MG/DL (ref 65–100)
HCT VFR BLD AUTO: 43.7 % (ref 36.6–50.3)
HGB BLD-MCNC: 13.8 G/DL (ref 12.1–17)
MAGNESIUM SERPL-MCNC: 2.6 MG/DL (ref 1.6–2.4)
MCH RBC QN AUTO: 30.3 PG (ref 26–34)
MCHC RBC AUTO-ENTMCNC: 31.6 G/DL (ref 30–36.5)
MCV RBC AUTO: 95.8 FL (ref 80–99)
NRBC # BLD: 0 K/UL (ref 0–0.01)
NRBC BLD-RTO: 0 PER 100 WBC
P-R INTERVAL, ECG05: 158 MS
PLATELET # BLD AUTO: 124 K/UL (ref 150–400)
PMV BLD AUTO: 11.8 FL (ref 8.9–12.9)
POTASSIUM SERPL-SCNC: 4.6 MMOL/L (ref 3.5–5.1)
PROT SERPL-MCNC: 7.2 G/DL (ref 6.4–8.2)
Q-T INTERVAL, ECG07: 410 MS
QRS DURATION, ECG06: 86 MS
QTC CALCULATION (BEZET), ECG08: 479 MS
RBC # BLD AUTO: 4.56 M/UL (ref 4.1–5.7)
SODIUM SERPL-SCNC: 137 MMOL/L (ref 136–145)
VENTRICULAR RATE, ECG03: 82 BPM
WBC # BLD AUTO: 20.2 K/UL (ref 4.1–11.1)

## 2023-02-24 PROCEDURE — 36415 COLL VENOUS BLD VENIPUNCTURE: CPT

## 2023-02-24 PROCEDURE — 74011250637 HC RX REV CODE- 250/637: Performed by: NURSE PRACTITIONER

## 2023-02-24 PROCEDURE — 85027 COMPLETE CBC AUTOMATED: CPT

## 2023-02-24 PROCEDURE — 74011250636 HC RX REV CODE- 250/636: Performed by: NURSE PRACTITIONER

## 2023-02-24 PROCEDURE — 74011000250 HC RX REV CODE- 250: Performed by: THORACIC SURGERY (CARDIOTHORACIC VASCULAR SURGERY)

## 2023-02-24 PROCEDURE — 83735 ASSAY OF MAGNESIUM: CPT

## 2023-02-24 PROCEDURE — 80053 COMPREHEN METABOLIC PANEL: CPT

## 2023-02-24 RX ORDER — OXYCODONE HYDROCHLORIDE 5 MG/1
5 TABLET ORAL
Qty: 10 TABLET | Refills: 0 | Status: SHIPPED | OUTPATIENT
Start: 2023-02-24 | End: 2023-03-01

## 2023-02-24 RX ORDER — CHLORHEXIDINE GLUCONATE 1.2 MG/ML
10 RINSE ORAL EVERY 12 HOURS
Status: DISCONTINUED | OUTPATIENT
Start: 2023-02-24 | End: 2023-02-24 | Stop reason: HOSPADM

## 2023-02-24 RX ORDER — METHYLPREDNISOLONE 4 MG/1
TABLET ORAL
Qty: 1 DOSE PACK | Refills: 0 | Status: SHIPPED | OUTPATIENT
Start: 2023-02-24 | End: 2023-03-02 | Stop reason: ALTCHOICE

## 2023-02-24 RX ORDER — POLYETHYLENE GLYCOL 3350 17 G/17G
17 POWDER, FOR SOLUTION ORAL DAILY
Qty: 14 PACKET | Refills: 0 | Status: SHIPPED | OUTPATIENT
Start: 2023-02-24 | End: 2023-03-02 | Stop reason: ALTCHOICE

## 2023-02-24 RX ORDER — AMOXICILLIN 250 MG
1 CAPSULE ORAL 2 TIMES DAILY
Qty: 28 TABLET | Refills: 0 | Status: SHIPPED | OUTPATIENT
Start: 2023-02-24 | End: 2023-03-02 | Stop reason: ALTCHOICE

## 2023-02-24 RX ORDER — METOPROLOL SUCCINATE 25 MG/1
50 TABLET, EXTENDED RELEASE ORAL
Qty: 30 TABLET | Refills: 1 | Status: SHIPPED | OUTPATIENT
Start: 2023-02-25

## 2023-02-24 RX ORDER — AMIODARONE HYDROCHLORIDE 200 MG/1
TABLET ORAL
Qty: 84 TABLET | Refills: 0 | Status: SHIPPED | OUTPATIENT
Start: 2023-02-24 | End: 2023-04-11 | Stop reason: SDUPTHER

## 2023-02-24 RX ADMIN — Medication 400 MG: at 09:28

## 2023-02-24 RX ADMIN — POLYETHYLENE GLYCOL 3350 17 G: 17 POWDER, FOR SOLUTION ORAL at 09:31

## 2023-02-24 RX ADMIN — FAMOTIDINE 20 MG: 20 TABLET, FILM COATED ORAL at 09:28

## 2023-02-24 RX ADMIN — METOPROLOL TARTRATE 25 MG: 25 TABLET, FILM COATED ORAL at 09:28

## 2023-02-24 RX ADMIN — METHYLPREDNISOLONE SODIUM SUCCINATE 125 MG: 125 INJECTION, POWDER, FOR SOLUTION INTRAMUSCULAR; INTRAVENOUS at 09:28

## 2023-02-24 RX ADMIN — CHLORHEXIDINE GLUCONATE 10 ML: 1.2 RINSE ORAL at 09:28

## 2023-02-24 RX ADMIN — AMIODARONE HYDROCHLORIDE 200 MG: 200 TABLET ORAL at 09:28

## 2023-02-24 RX ADMIN — ACETAMINOPHEN 1000 MG: 500 TABLET ORAL at 05:27

## 2023-02-24 RX ADMIN — SENNOSIDES AND DOCUSATE SODIUM 1 TABLET: 50; 8.6 TABLET ORAL at 09:28

## 2023-02-24 NOTE — PROGRESS NOTES
No CM needs identified at this time. Transition of Care Plan:    RUR: 9% \"low risk\"  Disposition: Home with follow up apts  Follow up appointments: PCP & Specialists as needed  DME needed: Has a Rolling Walker at home  Transportation at Discharge: Pt's wife to provide transport at d/c  [place] or means to access home: Pt has access to home       IM Medicare Letter: 2nd IM letter given; signed copy on chart  Is patient a [place] and connected with the [place]? N/A  Caregiver Contact: Pt's wife Iesha Conti [phone]  Discharge Caregiver contacted prior to discharge? No, Pt has contacted wife  Care Conference needed?: Not at this time    Initial note: Chart reviewed for updates. CM met with pt at bedside, introduced role, confirmed demographics were up-to date and discussed d/c planning. Pt lives with his wife Iesha Conti [phone] in a 2 level home with no steps before getting to the front door. Pt's wife is his main support system. Pt is independent with ADL's at home and has a walker at home but does not use it. Pt reported history of OP rehab but denied history of IPR/SNF/HH. Pt uses [facility]. Pt reports last visit to PCP was last year. Pt's wife will provide transport at d/c. No PT/OT needs identified at this time. No CM needs identified at this time.  CM will remain accessible for consult incase additional CM needs arise prior d/c.     Reason for Admission:  AFIB                  RUR Score: 9% \"low risk\"                     Plan for utilizing home health:   Pt is independent with ADL's at home and has a walker at home but does not use it         PCP: First and Last name:  Blayne Cavazos DO     Name of Practice: [facility]   Are you a current patient: Yes/No: Yes   Approximate date of last visit: Last year   Can you participate in a virtual visit with your PCP: in-person                    Current Advanced Directive/Advance Care Plan: Full Code-ACP on file    Healthcare Decision Maker:       Primary Decision Maker: Arnel Sutton - Spouse - [phone]    Primary Decision Maker: Shashi December Spouse - [phone]    Care Management Interventions  PCP Verified by CM: Yes  Palliative Care Criteria Met (RRAT>21 & CHF Dx)?: No  Mode of Transport at Discharge:  Other (see comment) (Wife to provide transport at d/c)  Transition of Care Consult (CM Consult): Discharge Planning  Discharge Durable Medical Equipment: No  Physical Therapy Consult: No  Occupational Therapy Consult: No  Speech Therapy Consult: No  Support Systems: Spouse/Significant Other (Pt's wife is his main support system)  Confirm Follow Up Transport: Family (Wife to provide transport at d/c)  The Plan for Transition of Care is Related to the Following Treatment Goals : Home with follow up apts  Discharge Location  Patient Expects to be Discharged to[de-identified] Home (Pt is d/c home with follow up apts)     FEMI Edmonds    [phone]

## 2023-02-24 NOTE — PROGRESS NOTES
Bedside shift change report given to Edilberto Chavez (oncoming nurse) by Christopher Gillette RN (offgoing nurse). Report included the following information SBAR, Kardex, MAR, and Recent Results.

## 2023-02-24 NOTE — PROGRESS NOTES
1900: Bedside shift change report given to [facility] (oncoming nurse) by Cecilia Valencia (offgoing nurse). Report included the following information SBAR, Kardex, Intake/Output, MAR, and Recent Results. 0700: Cardiac Surgery End of Shift Report PCU  Bedside shift change report given to FARA MAYERS (oncoming nurse) by [facility] (offgoing nurse). Report included the following information SBAR, Kardex, Intake/Output, MAR, and Recent Results. Vitals:  Patient Vitals for the past 4 hrs:   Temp Pulse Resp BP SpO2   02/24/23 0527 98 °F (36.7 °C) 79 18 115/80 98 %          EKG/Rhythm:   NSR                  External Pacemaker:  NO                  Pacer wires Capped?: NO    Oxygen therapy:   Oxygen Delivery:   room air    ISS Teaching Yes   Use : YES     Volume:       Lines & Drains:  Peripherally Inserted Central Catheter:        Cardiac drips?: N/A      12 hour Output:     12 hour Chest Tube Output: N/A     12 Hour Urine Output: 1000 ml                                  Daily Weight: 82.7 kg    Skin/Wounds:  Incision 02/22/23 Chest (Active)   Dressing Status Clean;Dry; Intact 02/23/23 1500   Cleansed Not cleansed 02/22/23 2000   Dressing/Treatment Surgical glue 02/23/23 1500   Number of days: 2           Pain Control:   Post op pain controlled by tylenol    Activity:   Up in chair YES ; 2 times,  Duration 60 min         Ambulated YES; Distance 25 ft , 2 times    Hygiene: CHG bath YES SHOWER NO      Concerns/ Communication: None

## 2023-02-24 NOTE — DISCHARGE INSTRUCTIONS
Cardiac Surgery Specialist    [facility] [address]                                          [place]                                        [address], [address] [facility]  Office- [phone]  Fax- [phone]       Office- [phone]  Fax- [phone]  _____________________________________________________________  [address] Dr., NP, [place] Joline Parcel, NP         Name:[facility]     Active Problems:    Atrial fibrillation [facility]) (6/3/2019)      S/P left atrial appendage ligation (2/22/2023)      Overview: TRANSPERICARDIAL HYBRID ABLATION       LEFT ATRIAL APPENDAGE CLIP VIA LEFT VIDEO ASSISTED THORACIC SURGERY      Cardioversion      S/P ablation operation for arrhythmia (2/22/2023)      Overview: TRANSPERICARDIAL HYBRID ABLATION       LEFT ATRIAL APPENDAGE CLIP VIA LEFT VIDEO ASSISTED THORACIC SURGERY      Cardioversion        Discharge Date: 2/24/2023     Discharge to: Home    INSTRUCTIONS:    Wash incision with soap and water daily. No strenuous activity for a week. No lifting more than 10 lbs for a week. No driving while taking pain medications. Your follow-up appointment with Dr Misty Bower will be on 3/2/23 at 2:15pm. Office is located at [facility], [address] [place] [address], [address]. Please call our office at [phone] if you are unable to make this appointment. PLEASE bring your medication bottles to each appointment. Your appointment with Safia Platt NP (Dr. Johan Bowers) will be on 3/21/23 at 8:45am. Office phone is [phone]. Please sign up for My Chart. Call the office immediately for any shortness of breath or chest pressure/discomfort.     CALL [phone] FOR ANY QUESTIONS OR PROBLEMS.     Signature:___________________________________________________

## 2023-02-24 NOTE — PROGRESS NOTES
Patient: Ham Cerna   Age: 78 y.o. Patient Care Team:  Abril Alarcon DO as PCP - General (Internal Medicine Physician)  Abril Alarcon DO as PCP - [address] [place] [address] [facility]  Alexandra De Oliveira MD as Physician (Cardiovascular Disease Physician)  Brooks Burnett MD ([address] Vascular Surgery)  Sebastian Ross MD (Cardiothoracic Surgery)  Ben Hatfield MD (Clinical Cardiac Electrophysiology Physician)    Diagnosis: There were no encounter diagnoses. Problem List:   Patient Active Problem List   Diagnosis Code    Atrial fibrillation (HCC) I48.91    Mixed hyperlipidemia E78.2    Nonischemic cardiomyopathy (HCC) W86.4    Systolic CHF, chronic (HCC) I50.22    Colon cancer (HCC) C18.9    S/P left atrial appendage ligation Z98.890    S/P ablation operation for arrhythmia Z98.890, Z86.79          Date of Surgery: 2/22/2023     Surgery:    TRANSPERICARDIAL HYBRID ABLATION   LEFT ATRIAL APPENDAGE CLIP VIA LEFT VIDEO ASSISTED THORACIC SURGERY       HPI:  Pt is here for post op follow up. Patient underwent a TRANSPERICARDIAL HYBRID ABLATION WITH FLUOROSCOPY AND LEFT ATRIAL APPENDAGE CLIP VIA LEFT VIDEO ASSISTED THORACIC SURGERY, SHAHRAM BY DR CAPUTO on 2/23/23. He was extubated in the OR and transferred to ICU in stable condition on Precedex and Filiberto. Post-operative course was unremarkable and he discharged home on POD2. Overall is doing great. Mild incisional discomfort. Not bad enough to need Tylenol. (-) Syncope/near syncope  (-) CP,   (-) SOB,  (-)  SARAH,   (-) PND,   (-) orthopnea,   (-) fever,   (-) chills,   (-) N/V/D. (-) redness or swelling   Wounds do drain a small amount of red-yellow drainage. Appetite good. Bowel and bladder function normalized. Has not been checking Bps or weights  Has not been checking weights    Left shoulder- movement is restricted, started in October of 2022 after a fall.  Wants ortho referral.     Current Medications:   Current Outpatient Medications   Medication Sig Dispense Refill    amiodarone (CORDARONE) 200 mg tablet Take 2 tabs by mouth twice a day for two weeks, then two tabs once a day for two weeks, then STOP  Indications: prevention of recurrent atrial fibrillation 84 Tablet 0    oxyCODONE IR (ROXICODONE) 5 mg immediate release tablet Take 1 Tablet by mouth every six (6) hours as needed for Pain (for pain unresponsive to Tylenol) for up to 5 days. Max Daily Amount: 20 mg. 10 Tablet 0    polyethylene glycol (MIRALAX) 17 gram packet Take 1 Packet by mouth daily. 14 Packet 0    senna-docusate (PERICOLACE) 8.6-50 mg per tablet Take 1 Tablet by mouth two (2) times a day. 28 Tablet 0    methylPREDNISolone (MEDROL DOSEPACK) 4 mg tablet As directed. Indications: prevention of pericarditis post hybrid ablation 1 Dose Pack 0    [START ON 2/25/2023] metoprolol succinate (TOPROL-XL) 25 mg XL tablet Take 2 Tablets by mouth nightly. 30 Tablet 1    apixaban (ELIQUIS) 5 mg tablet Take 1 Tablet by mouth two (2) times a day. Replaces aspirin on 1/10/2022 180 Tablet 4    acetaminophen (TYLENOL) 500 mg tablet Take 2 Tablets by mouth every six (6) hours. 32 Tablet 0    melatonin 3 mg tablet Take 1-2 Tablets by mouth nightly as needed for Insomnia or Sleep.       rosuvastatin (CRESTOR) 20 mg tablet Take 1 tablet by mouth nightly 90 Tablet 3     Facility-Administered Medications Ordered in Other Visits   Medication Dose Route Frequency Provider Last Rate Last Admin    chlorhexidine (PERIDEX) 0.12 % mouthwash 10 mL  10 mL Oral Q12H Jet VÁSQUEZ MD   10 mL at 02/24/23 0928    metoprolol tartrate (LOPRESSOR) tablet 25 mg  25 mg Oral Q12H Gabrielle Brandt NP   25 mg at 02/24/23 6000    morphine injection 2 mg  2 mg IntraVENous Q4H PRN Gabrielle Brandt NP        sodium chloride (NS) flush 5-40 mL  5-40 mL IntraVENous Q8H Gabrielle Brandt NP   10 mL at 02/23/23 2248    sodium chloride (NS) flush 5-40 mL  5-40 mL IntraVENous PRN Lisette Brandt NP rosuvastatin (CRESTOR) tablet 20 mg  20 mg Oral QHS Gabrielle Brandt, NP   20 mg at 02/23/23 2242    sodium chloride (NS) flush 5-40 mL  5-40 mL IntraVENous Q8H Gabrielle Brandt, NP   10 mL at 02/23/23 6710    sodium chloride (NS) flush 5-40 mL  5-40 mL IntraVENous PRN Gabrielle Brandt, NP        oxyCODONE IR (ROXICODONE) tablet 5 mg  5 mg Oral Q4H PRN Gabrielle Brandt, NP   5 mg at 02/23/23 0450    oxyCODONE IR (ROXICODONE) tablet 10 mg  10 mg Oral Q4H PRN Gabrielle Brandt, NP        naloxone (NARCAN) injection 0.4 mg  0.4 mg IntraVENous PRN Gabrielle Brandt, NP        mupirocin (BACTROBAN) 2 % ointment   Both Nostrils BID Gabrielle Brandt, NP   Given at 02/23/23 1800    ondansetron (ZOFRAN) injection 4 mg  4 mg IntraVENous Q4H PRN Gabrielle Brandt, NP   4 mg at 02/22/23 1520    albuterol (PROVENTIL VENTOLIN) nebulizer solution 2.5 mg  2.5 mg Nebulization Q4H PRN Gabrielle Brandt, NP        famotidine (PEPCID) tablet 20 mg  20 mg Oral Q12H Gabrielle Brandt, NP   20 mg at 02/24/23 7790    magnesium oxide (MAG-OX) tablet 400 mg  400 mg Oral BID Gabrielle Brandt, NP   400 mg at 02/24/23 4725    bisacodyL (DULCOLAX) suppository 10 mg  10 mg Rectal DAILY PRN Gabrielle Brandt, NP        senna-docusate (PERICOLACE) 8.6-50 mg per tablet 1 Tablet  1 Tablet Oral BID Gabrielle Brandt, NP   1 Tablet at 02/24/23 0928    polyethylene glycol (MIRALAX) packet 17 g  17 g Oral DAILY Gabrielle Brandt, NP   17 g at 02/24/23 0931    melatonin tablet 3-6 mg  3-6 mg Oral QHS PRN Gabrielle Brandt, NP        sodium chloride (NS) flush 5-40 mL  5-40 mL IntraVENous Q8H Gabrielle Brandt NP   10 mL at 02/23/23 1645    sodium chloride (NS) flush 5-40 mL  5-40 mL IntraVENous PRN Gabrielle Brandt NP        amiodarone (CORDARONE) tablet 200 mg  200 mg Oral BID Gabrielle Brandt NP   200 mg at 02/24/23 0928    methylPREDNISolone (PF) (Solu-MEDROL) injection 125 mg  125 mg IntraVENous Q12H Gabrielle Brandt NP   125 mg at 02/24/23 0928    acetaminophen (TYLENOL) tablet 1,000 mg  1,000 mg Oral Q6H Gabrielle Brandt, NP   1,000 mg at 02/24/23 0527    lidocaine 4 % patch 2 Patch  2 Patch TransDERmal Q24H Gabrielle Brandt NP   2 Patch at 02/22/23 1733       Vitals: There were no vitals taken for this visit. Allergies: has No Known Allergies. Physical Exam:  General: Awake, alert, oriented  and no acute distress   Lungs:    Clear to auscultation bilaterally and on RA   Incision:  Incisions with a small amount of serosanguinous drainage; no redness or swelling   Heart:   Regular rate and rhythm  and no murmurs, rubs or gallops   Abdomen:    Soft, non-distended, non-tender   Extremities:   1+ pitting edema BLE    Neurologic:  Gross motor and sensory apparatus intact         Assessment/Plan:   Persistent Afib s/p Hybrid ablation with LAAL: Doing well. Advised to clean wounds BID and to keep covered while draining. Advised to call if he develops fevers or chills or if he develops redness or if the drainage becomes purulent or malodorous. Cont current regimen for now; follow up with cardiology. Chronic systolic heart failure, NYHA class I: LVEF 40-45% on most recent TTE. Not in exacerbation. Cont current regimen for now; follow up with cardiology. CKD stage IIIb: Creatinine 1.55 from 1.31 yesterday. BL ~1.4. Avoid nephrotoxins. OP follow up. Elevated T bili: Elevated in December of 2022 as well; normalized on date of discharge (0.8). Avoid hepatotoxins; OP follow up. HTN: on Toprol 50mg and Lisinopril 2.5mg; BB resumed 2/23/23. Cont current regimen for now; follow up with cardiology. HLD: On rosuvastatin PTA, continue. Overweight: BMI 27.8. diet and exercise counseling. Postop pain control, stable: Cont scheduled Tylenol and PRN Oxycodone   Left shoulder pain after a fall in 2022. Will refer to ortho. Pt is ready to start cardiac rehab.      Time spent: 30 minutes

## 2023-02-24 NOTE — PROGRESS NOTES
Problem: Non-Violent Restraints  Goal: Removal from restraints as soon as assessed to be safe  Outcome: Resolved/Met  Goal: No harm/injury to patient while restraints in use  Outcome: Resolved/Met  Goal: Patient's dignity will be maintained  Outcome: Resolved/Met  Goal: Patient Interventions  Outcome: Resolved/Met     Problem: Falls - Risk of  Goal: *Absence of Falls  Description: Document Susan Awan Fall Risk and appropriate interventions in the flowsheet.   Outcome: Resolved/Met  Note: Fall Risk Interventions:

## 2023-02-27 ENCOUNTER — PATIENT OUTREACH (OUTPATIENT)
Dept: CASE MANAGEMENT | Age: 68
End: 2023-02-27

## 2023-03-02 ENCOUNTER — OFFICE VISIT (OUTPATIENT)
Dept: CARDIOTHORACIC SURGERY | Age: 68
End: 2023-03-02
Payer: MEDICARE

## 2023-03-02 VITALS
HEART RATE: 69 BPM | BODY MASS INDEX: 27.04 KG/M2 | SYSTOLIC BLOOD PRESSURE: 120 MMHG | TEMPERATURE: 98.1 F | OXYGEN SATURATION: 98 % | DIASTOLIC BLOOD PRESSURE: 70 MMHG | HEIGHT: 68 IN | RESPIRATION RATE: 16 BRPM | WEIGHT: 178.4 LBS

## 2023-03-02 DIAGNOSIS — Z86.79 S/P ABLATION OPERATION FOR ARRHYTHMIA: Primary | ICD-10-CM

## 2023-03-02 DIAGNOSIS — Z98.890 S/P ABLATION OPERATION FOR ARRHYTHMIA: Primary | ICD-10-CM

## 2023-03-02 DIAGNOSIS — M25.512 LEFT SHOULDER PAIN, UNSPECIFIED CHRONICITY: ICD-10-CM

## 2023-03-02 PROCEDURE — G8427 DOCREV CUR MEDS BY ELIG CLIN: HCPCS | Performed by: THORACIC SURGERY (CARDIOTHORACIC VASCULAR SURGERY)

## 2023-03-02 PROCEDURE — G9711 PT HX TOT COL OR COLON CA: HCPCS | Performed by: THORACIC SURGERY (CARDIOTHORACIC VASCULAR SURGERY)

## 2023-03-02 PROCEDURE — G8432 DEP SCR NOT DOC, RNG: HCPCS | Performed by: THORACIC SURGERY (CARDIOTHORACIC VASCULAR SURGERY)

## 2023-03-02 PROCEDURE — G8417 CALC BMI ABV UP PARAM F/U: HCPCS | Performed by: THORACIC SURGERY (CARDIOTHORACIC VASCULAR SURGERY)

## 2023-03-02 PROCEDURE — G8536 NO DOC ELDER MAL SCRN: HCPCS | Performed by: THORACIC SURGERY (CARDIOTHORACIC VASCULAR SURGERY)

## 2023-03-02 PROCEDURE — 1111F DSCHRG MED/CURRENT MED MERGE: CPT | Performed by: THORACIC SURGERY (CARDIOTHORACIC VASCULAR SURGERY)

## 2023-03-02 PROCEDURE — 1101F PT FALLS ASSESS-DOCD LE1/YR: CPT | Performed by: THORACIC SURGERY (CARDIOTHORACIC VASCULAR SURGERY)

## 2023-03-02 NOTE — PROGRESS NOTES
Identified pt with two pt identifiers(name and ). Reviewed record in preparation for visit and have obtained necessary documentation. All patient medications has been reviewed. Chief Complaint   Patient presents with    Irregular Heart Beat    Post OP Follow Up     1 week hybrid ablation follow up       Health Maintenance Due   Topic    DTaP/Tdap/Td series (1 - Tdap)    COVID-19 Vaccine (3 - Booster for ALTO CINCO series)    Flu Vaccine (1)    Lipid Screen        Vitals:    23 1426   BP: 120/70   Pulse: 69   Resp: 16   Temp: 98.1 °F (36.7 °C)   SpO2: 98%   Weight: 178 lb 6.4 oz (80.9 kg)   Height: 5' 8\" (1.727 m)       4. Have you been to the ER, urgent care clinic since your last visit? Hospitalized since your last visit? No    5. Have you seen or consulted any other health care providers outside of the [address] [place] [address] since your last visit? Include any pap smears or colon screening. No      Patient is accompanied by wife. I have received verbal consent from Anabel Cohn to discuss any/all medical information while they are present in the room.

## 2023-03-09 DIAGNOSIS — M25.512 LEFT SHOULDER PAIN, UNSPECIFIED CHRONICITY: Primary | ICD-10-CM

## 2023-03-10 ENCOUNTER — HOSPITAL ENCOUNTER (OUTPATIENT)
Dept: GENERAL RADIOLOGY | Age: 68
Discharge: HOME | End: 2023-03-10
Payer: MEDICARE

## 2023-03-10 ENCOUNTER — OFFICE VISIT (OUTPATIENT)
Dept: ORTHOPEDIC SURGERY | Age: 68
End: 2023-03-10

## 2023-03-10 VITALS
SYSTOLIC BLOOD PRESSURE: 116 MMHG | TEMPERATURE: 96.9 F | WEIGHT: 178 LBS | HEART RATE: 56 BPM | HEIGHT: 68 IN | BODY MASS INDEX: 26.98 KG/M2 | DIASTOLIC BLOOD PRESSURE: 79 MMHG | OXYGEN SATURATION: 97 %

## 2023-03-10 DIAGNOSIS — M75.42 IMPINGEMENT SYNDROME OF LEFT SHOULDER: ICD-10-CM

## 2023-03-10 DIAGNOSIS — M25.512 LEFT SHOULDER PAIN, UNSPECIFIED CHRONICITY: ICD-10-CM

## 2023-03-10 DIAGNOSIS — Z98.890 S/P LEFT ATRIAL APPENDAGE LIGATION: Primary | ICD-10-CM

## 2023-03-10 PROCEDURE — 73030 X-RAY EXAM OF SHOULDER: CPT

## 2023-03-10 RX ORDER — TRAMADOL HYDROCHLORIDE 50 MG/1
50 TABLET ORAL
Qty: 21 TABLET | Refills: 0 | Status: SHIPPED | OUTPATIENT
Start: 2023-03-10 | End: 2023-03-17

## 2023-03-10 NOTE — PROGRESS NOTES
Patient3/10/2023      CC: Left shoulder pain    HPI:      This is a 78y.o. year old patient who complains of left shoulder pain. This pain has been going on for years, worse this past month. This is activity dependent pain. The pain is in the shoulder, anteriorly as well as laterally. This pain is worse with activity and better with rest.  The pain is severe in nature. So far, the patient has tried tylenol. The patient is right hand dominant. PMH:  Past Medical History:   Diagnosis Date    Arthritis     Atrial fibrillation ([facility]     Cancer ([facility]     sigmoid colon    Congestive heart failure ([facility]     Diabetes ([facility]     prediabetic    Hypercholesterolemia     Hypertension        PSxHx:  Past Surgical History:   Procedure Laterality Date    HX APPENDECTOMY  2011    HX CATARACT REMOVAL Bilateral     HX HERNIA REPAIR      HX TOTAL COLECTOMY  09/2013    cancer       Meds:    Current Outpatient Medications:     traMADoL (ULTRAM) 50 mg tablet, Take 1 Tablet by mouth every eight (8) hours as needed for Pain for up to 7 days. Max Daily Amount: 150 mg., Disp: 21 Tablet, Rfl: 0    amiodarone (CORDARONE) 200 mg tablet, Take 2 tabs by mouth twice a day for two weeks, then two tabs once a day for two weeks, then STOP  Indications: prevention of recurrent atrial fibrillation, Disp: 84 Tablet, Rfl: 0    metoprolol succinate (TOPROL-XL) 25 mg XL tablet, Take 2 Tablets by mouth nightly., Disp: 30 Tablet, Rfl: 1    apixaban (ELIQUIS) 5 mg tablet, Take 1 Tablet by mouth two (2) times a day. Replaces aspirin on 1/10/2022, Disp: 180 Tablet, Rfl: 4    acetaminophen (TYLENOL) 500 mg tablet, Take 2 Tablets by mouth every six (6) hours. , Disp: 32 Tablet, Rfl: 0    rosuvastatin (CRESTOR) 20 mg tablet, Take 1 tablet by mouth nightly, Disp: 90 Tablet, Rfl: 3    All:  No Known Allergies    Social Hx:  Social History     Socioeconomic History    Marital status:    Tobacco Use    Smoking status: Never    Smokeless tobacco: Never Substance and Sexual Activity    Alcohol use: Not Currently     Comment: rarely    Drug use: Never    Sexual activity: Not Currently       Family Hx:  Family History   Problem Relation Age of Onset    Cancer Mother     Coronary Art Dis Father     Cancer Sister     Cancer Brother     Emphysema Brother          Review of Systems:       General: Denies headache, lethargy, fever, weight loss  Ears/Nose/Throat: Denies ear discharge, drainage, nosebleeds, hoarse voice, dental problems  Cardiovascular: Denies chest pain, shortness of breath  Lungs: Denies chest pain, breathing problems, wheezing, pneumonia  Stomach: Denies stomach pain, heartburn, constipation, irritable bowel  Skin: Denies rash, sores, open wounds  Musculoskeletal: Left shoulder pain which is activity dependent, it is anteriorly on the shoulder  Genitourinary: Denies dysuria, hematuria, polyuria  Gastrointestinal: Denies constipation, obstipation, diarrhea  Neurological: Denies changes in sight, smell, hearing, taste, seizures. Denies loss of consciousness.   Psychiatric: Denies depression, sleep pattern changes, anxiety, change in personality  Endocrine: Denies mood swings, heat or cold intolerance  Hematologic/Lymphatic: Denies anemia, purpura, petechia  Allergic/Immunologic: Denies swelling of throat, pain or swelling at lymph nodes      Physical Examination:    Visit Vitals  /79 (BP 1 Location: Right upper arm, BP Patient Position: Sitting, BP Cuff Size: Large adult)   Pulse (!) 56   Temp 96.9 °F (36.1 °C) (Tympanic)   Ht 5' 8\" (1.727 m)   Wt 178 lb (80.7 kg)   SpO2 97%   BMI 27.06 kg/m²        General: AOX3, no apparent distress  Psychiatric: mood and affect appropriate  Lungs: breathing is symmetric and unlabored bilaterally  Heart: regular rate and rhythm  Abdomen: no guarding  Head: normocephalic, atraumatic  Skin: No significant abnormalities, good turgor  Sensation intact to light touch: C5-T1 dermatomes  Muscular exam: 5/5 strength in all major muscle groups unless noted in specialty exam.    Extremities      Right upper extremity: Full active and passive range of motion without pain, deformity, no open wound, strength 5/5 in all major muscle groups. Left upper extremity:  Full active and passive range of motion without pain, deformity, no open wound, strength 5/5 in all major muscle groups. Left lower extremity: Patient complains of mild tenderness at the lateral proximal humerus. Range of motion is limited secondary to pain, open can test is positive, impingement maneuver is positive, Speeds and Yergason's tests are equivocal.  Bearhug test is negative. There is no specific tenderness to palpation along the acromioclavicular joint. There is no gross deformity, no obvious muscular atrophy. Sensation is intact light touch in the C5-T1 dermatomes. Cervical range of motion is full and pain-free and does not reproduce any of the symptoms consistent with cervical pathology. Strength is 4 out of 5 with abduction of the shoulder, 5 out of 5 with forward flexion, biceps and triceps as well as hand intrinsics are 5 out of 5 strength. Capillary refill is less than 2 seconds distally. Right lower extremity:  Full active and passive range of motion without pain, deformity, no open wound, strength 5/5 in all major muscle groups. Diagnostics:    Pertinent Xrays:  Xrays are available of the shoulder. These indicate no fractures, dislocations, or osseous abnormalities. Soft tissue is within expected limits. Minor greater tuberosity changes and significant AC joint arthritis    Assessment: Impingement syndrome, left shoulder    Plan: This patient and I did discuss options for this particular pathology.   As there is no indication of overt tearing of the rotator cuff, and that in the setting conservative treatment is the standard of care, I have recommended that some combination of oral analgesics, ideally anti-inflammatories, physical therapy exercises, subacromial injections perhaps other topical forms of treatment would be the first-line of treatment for this particular problem. Patient stated their understanding the pathology as well as the anatomy and treatment options. We have agreed to PT, tramadol. The patient will follow-up approximately 4 to 6 weeks for a clinical check should any symptoms remain. No x-rays are necessary on follow-up. Procedures performed: none today    Mr. Becca Monet has a reminder for a \"due or due soon\" health maintenance. I have asked that he contact his primary care provider for follow-up on this health maintenance.

## 2023-03-10 NOTE — PROGRESS NOTES
Identified pt with two pt identifiers (name and ). Reviewed chart in preparation for visit and have obtained necessary documentation. Shyam Chowdhury is a 78 y.o. male  Chief Complaint   Patient presents with    Shoulder Pain     Lt Shoulder     Visit Vitals  /79 (BP 1 Location: Right upper arm, BP Patient Position: Sitting, BP Cuff Size: Large adult)   Pulse (!) 56   Temp 96.9 °F (36.1 °C) (Tympanic)   Ht 5' 8\" (1.727 m)   Wt 178 lb (80.7 kg)   SpO2 97%   BMI 27.06 kg/m²     1. Have you been to the ER, urgent care clinic since your last visit? Hospitalized since your last visit? No    2. Have you seen or consulted any other health care providers outside of the [address] [place] [address] since your last visit? Include any pap smears or colon screening.  No

## 2023-03-23 ENCOUNTER — HOSPITAL ENCOUNTER (OUTPATIENT)
Dept: PHYSICAL THERAPY | Age: 68
Discharge: HOME | End: 2023-03-23
Payer: MEDICARE

## 2023-03-23 PROCEDURE — 97140 MANUAL THERAPY 1/> REGIONS: CPT | Performed by: PHYSICAL THERAPIST

## 2023-03-23 PROCEDURE — 97110 THERAPEUTIC EXERCISES: CPT | Performed by: PHYSICAL THERAPIST

## 2023-03-23 PROCEDURE — 97162 PT EVAL MOD COMPLEX 30 MIN: CPT | Performed by: PHYSICAL THERAPIST

## 2023-03-23 NOTE — PROGRESS NOTES
PT INITIAL EVALUATION NOTE 2-15    Patient Name: Louise Dumont  Date:3/23/2023  : 1955  [x]  Patient  Verified  Payor: James Roy / Plan: [address] [place] [address] HMO / Product Type: Managed Care Medicare /    In time:917  Out time:1007  Total Treatment Time (min): 50  Timed Codes: 25  1:1 time: 25   Visit #: 1     Treatment Area: Impingement syndrome of left shoulder [M75.42]    SUBJECTIVE  Pain Level (0-10 scale): 0  Any medication changes, allergies to medications, adverse drug reactions, diagnosis change, or new procedure performed?: [] No    [x] Yes (see summary sheet for update)  Subjective:     Pt reports that he had a fall onto out stretched hand in . He states that it bothered him off and on. In October of last year he started trying to do his own therapy at home where he was using hot water in the shower and trying to force the arm up behind his back. This set of increased symptoms and pain since then. He has not had any other treatment for the left shoulder to date. He had x-rays taken which were negative. Day to day he complains of pain lifting into abduction and behind the back. He does have some discomfort at night. He describes a burning pain in the referral pattern of the rotator cuff. He denies any numbness or tingling at this time.          OBJECTIVE/EXAMINATION  OBJECTIVE  Posture:  rounded shoulders and forward head  Other Observations:  minor tremor and shaking noted  Functional  and Pinch:  NT  Palpation: anterior and lateral aspect of the shoulder    Shoulder ROM:   Left   Right   Flexion   110   168   Extension  NT   NT   Abduction  112   154   Adduction  NT   NT   IR   Hand to left gluteal T8   ER   Hand to upper trap T5    Joint Mobility Assessment: Glenohumeral: decreased posterior      Acromioclavicular: NT      Sternoclavicular: NT    UPPER QUARTER   MUSCLE STRENGTH  KEY       R  L  0 - No Contraction   Flexion  5  4  1 - Trace    Extension NT  NT  2 - Poor    Abduction 4  2  3 - Fair     IR  4  3  4 - Good    ER  3  3  5 - Normal       Neurological: Reflexes / Sensations: grossly intact  Special Tests: Neer Impingement: NT  Vazquez-Christ: +      Scapular Reposition: NT  Shoulder Abduction: +     Crank: NT    Load and Shift: NT    Jasper: NT    Apprehension: +    Relocation : NT   Speed's: +    Yergason: NT    AC Compression: NT    AC Crossover: +       12 min Therapeutic Exercise:  [x] See flow sheet :   Rationale: increase ROM, increase strength, and improve coordination to improve the patients ability to complete all activity      13 min Manual Therapy:  PROM all directions with inferior and post [address] mobs   Rationale: decrease pain, increase ROM, increase tissue extensibility, decrease trigger points, and increase postural awareness  to improve the patients ability to complete all activity    With   [x] TE   [] TA   [] Neuro   [] SC   [] other: Patient Education: [x] Review HEP    [x] Progressed/Changed HEP based on:   [x] positioning   [x] body mechanics   [] transfers   [] heat/ice application    [] other:        Other Objective/Functional Measures: FOTO Functional Measure: 58/100                  Pain Level (0-10 scale) post treatment: 2      ASSESSMENT:      [x]  See Plan of Pipe, PT 3/23/2023

## 2023-03-23 NOTE — THERAPY EVALUATION
[facility] Physical Therapy  [address] ([address]), [address] [place] Sab[place] Wong  Phone: [phone] Fax: [phone]     Plan of Care/Statement of Necessity for Physical Therapy Services  2-15    Patient name: Tish Tobias  : 1955  Provider#: [id number]  Referral source: Rafiq Lantigua MD      Medical/Treatment Diagnosis: Impingement syndrome of left shoulder [M75.42]     Prior Hospitalization: see medical history     Comorbidities: NA  Prior Level of Function: 20 min 1-2 times a week  Medications: Verified on Patient Summary List  Start of Care: 3/23/23      Onset Date: chronic   The Plan of Care and following information is based on the information from the initial evaluation. Assessment/ key information: Pt is a 77 yo male who is in today to begin therapy for left shoulder pain. He reports first having issues with this shoulder in  after a fall onto an outstretched hand. He more recently had an increase in discomfort last October when he began forcing his shoulder into internal rotation stretches behind his back to gain more ROM. Recent x-rays were negative. He reports that day to day he has difficulty raising the arm out to the side and difficulty sleeping through the night. Objective findings include: AROM flex 110, Abd 112, ER to upper trap, IR to left glute. There is decreased posterior GH mobility and decreased strength throughout. Pt is a good candidate for PT and will benefit from skilled services to address these deficits and work toward the goals listed below.         Evaluation Complexity History LOW Complexity : Zero comorbidities / personal factors that will impact the outcome / POC; Examination HIGH Complexity : 4+ Standardized tests and measures addressing body structure, function, activity limitation and / or participation in recreation  ;Presentation MEDIUM Complexity : Evolving with changing characteristics ;Clinical Decision Making MEDIUM Complexity : FOTO score of 26-74  Overall Complexity Rating: MEDIUM    Problem List: pain affecting function, decrease ROM, decrease strength, impaired gait/ balance, decrease ADL/ functional abilitiies, decrease activity tolerance, decrease flexibility/ joint mobility, and decrease transfer abilities   Treatment Plan may include any combination of the following: Therapeutic exercise, Neuromuscular reeducation, Manual therapy, Therapeutic activity, Self care/home management, Electric stim unattended , Vasopneumatic device, Gait training, Ultrasound, Mechanical traction, Electric stim attended, Needle insertion w/o injection (1 or 2 muscles), Needle insertion w/o injection (3+ muscles), and Canalith repositioning  Patient / Family readiness to learn indicated by: asking questions, trying to perform skills, and interest  Persons(s) to be included in education: patient (P)  Barriers to Learning/Limitations: None  Patient Goal (s): gain greater use and diagnose the problem  Patient Self Reported Health Status: good  Rehabilitation Potential: good    Short Term Goals: To be accomplished in 8-10 treatments:   Pt will be consistent and demonstrate I with HEP  Pt will complain of pain 3-4/10 with all activity  Pt will demonstrate improved ROM to complete all ADL's more efficiently    Long Term Goals: To be accomplished in 22-24 treatments:   Pt will complain of pain 0-1/10 with all activity  Pt will increase strength to stabilize the shoulder and maintain proper posture with all activity  Pt will increase FOTO score by 4 points to meet MDC and demonstrate improved function with all activity  Pt will return to household chores and daily activity without increased symptoms  Pt will use proper form and posture to complete all work tasks without increased symptoms 100% of the time    Frequency / Duration: Patient to be seen 2 times per week for 24 treatments.     Patient/ Caregiver education and instruction: self care, activity modification, and exercises    [x]  Plan of care has been reviewed with PTA        Certification Period: 3/23/23-6/21/23  Heriberto Marinelli, PT 3/23/2023     ________________________________________________________________________    I certify that the above Therapy Services are being furnished while the patient is under my care. I agree with the treatment plan and certify that this therapy is necessary.     Physician's Signature:____________________  Date:____________Time: _________         Russel Washburn MD

## 2023-04-04 ENCOUNTER — HOSPITAL ENCOUNTER (OUTPATIENT)
Dept: PHYSICAL THERAPY | Age: 68
End: 2023-04-04
Payer: MEDICARE

## 2023-04-04 PROCEDURE — 97140 MANUAL THERAPY 1/> REGIONS: CPT | Performed by: PHYSICAL THERAPIST

## 2023-04-04 PROCEDURE — 97110 THERAPEUTIC EXERCISES: CPT | Performed by: PHYSICAL THERAPIST

## 2023-04-04 NOTE — PROGRESS NOTES
PT DAILY TREATMENT NOTE 2-15    Patient Name: Antonietta Arnold  Date:2023  : 1955  [x]  Patient  Verified  Payor: Riki [place] / Plan: [address] [place] [address] HMO / Product Type: Managed Care Medicare /    In time:   Out time: 144  Total Treatment Time (min): 45  Visit #:  2    Treatment Area: Impingement syndrome of left shoulder [M75.42]    SUBJECTIVE  Pain Level (0-10 scale): 0  Any medication changes, allergies to medications, adverse drug reactions, diagnosis change, or new procedure performed?: [x] No    [] Yes (see summary sheet for update)  Subjective functional status/changes:   [] No changes reported  Pt reports that he is sore    OBJECTIVE      25 min Therapeutic Exercise:  [x] See flow sheet :   Rationale: increase ROM, increase strength, improve coordination, improve balance, and increase proprioception to improve the patients ability to complete all activity    20 min Manual Therapy:  PROM all directions with grade II-III inferior and posterior mobs, STM to UT, levator, pectoral and rhomboid   Rationale: decrease pain, increase ROM, increase tissue extensibility, decrease trigger points, and increase postural awareness  to improve the patients ability to complete all activity          With   [x] TE   [] TA   [] Neuro   [] SC   [] other: Patient Education: [x] Review HEP    [] Progressed/Changed HEP based on:   [x] positioning   [x] body mechanics   [] transfers   [] heat/ice application    [] other:      Other Objective/Functional Measures: NA     Pain Level (0-10 scale) post treatment: 0    ASSESSMENT/Changes in Function:   Pt was able to complete all activity. Demonstrates improved ROM following treatment but does not feel that it is any better. Will continue to progress. Still differentiating impingement vs adhesive capsulitis.     Patient will continue to benefit from skilled PT services to modify and progress therapeutic interventions, address functional mobility deficits, address ROM deficits, address strength deficits, analyze and address soft tissue restrictions, analyze and cue movement patterns, analyze and modify body mechanics/ergonomics, assess and modify postural abnormalities, and address imbalance/dizziness to attain remaining goals.      []  See Plan of Care  []  See progress note/recertification  []  See Discharge Summary         Progress towards goals / Updated goals:  Progressing toward    PLAN  [x]  Upgrade activities as tolerated     [x]  Continue plan of care  [x]  Update interventions per flow sheet       []  Discharge due to:_  []  Other:_      Para Mc, PT 4/4/2023

## 2023-04-10 PROBLEM — C18.9 COLON CANCER (HCC): Chronic | Status: RESOLVED | Noted: 2019-08-30 | Resolved: 2023-04-10

## 2023-04-10 PROBLEM — C18.9 COLON CANCER (HCC): Status: RESOLVED | Noted: 2019-08-30 | Resolved: 2023-04-10

## 2023-04-11 ENCOUNTER — APPOINTMENT (OUTPATIENT)
Dept: PHYSICAL THERAPY | Age: 68
End: 2023-04-11
Payer: MEDICARE

## 2023-04-13 ENCOUNTER — HOSPITAL ENCOUNTER (OUTPATIENT)
Dept: PHYSICAL THERAPY | Age: 68
Discharge: HOME | End: 2023-04-13
Payer: MEDICARE

## 2023-04-13 PROCEDURE — 97110 THERAPEUTIC EXERCISES: CPT

## 2023-04-13 PROCEDURE — 97140 MANUAL THERAPY 1/> REGIONS: CPT

## 2023-04-18 ENCOUNTER — HOSPITAL ENCOUNTER (OUTPATIENT)
Dept: PHYSICAL THERAPY | Age: 68
Discharge: HOME | End: 2023-04-18
Payer: MEDICARE

## 2023-04-18 PROCEDURE — 97140 MANUAL THERAPY 1/> REGIONS: CPT

## 2023-04-18 PROCEDURE — 97110 THERAPEUTIC EXERCISES: CPT

## 2023-04-18 NOTE — PROGRESS NOTES
PT DAILY TREATMENT NOTE 2-15    Patient Name: Bianca Pearson  KMTK:  : 1955  [x]  Patient  Verified  Payor: Isaac Br[place] / Plan: [address] [place] [address] HMO / Product Type: Managed Care Medicare /    In time: 100 P Out time: 155 P   Total Treatment Time (min): 55  Visit #:  5    Treatment Area:  Impingement syndrome of left shoulder [M75.42]    SUBJECTIVE  Pain Level (0-10 scale): 0/10  Any medication changes, allergies to medications, adverse drug reactions, diagnosis change, or new procedure performed?: [x] No    [] Yes (see summary sheet for update)  Subjective functional status/changes:   [] No changes reported  Patient reports has noted signif decrease of pain L shoulder as well as increased motion with moving in front and overhead, continues to feel limited with rotation     Pt suggests that he may be ready to be done with PT since he is feeling much better     OBJECTIVE      40 min Therapeutic Exercise:  [x] See flow sheet : prog ex as per chart    Rationale: increase ROM, increase strength, improve coordination, improve balance, and increase proprioception to improve the patients ability to complete all activity    15 min Manual Therapy:  PROM all directions with grade II-III inferior and posterior mobs, STM to UT, levator, pectoral and rhomboid   Rationale: decrease pain, increase ROM, increase tissue extensibility, decrease trigger points, and increase postural awareness  to improve the patients ability to complete all activity          With   [x] TE   [] TA   [] Neuro   [] SC   [] other: Patient Education: [x] Review HEP    [] Progressed/Changed HEP based on:   [x] positioning   [x] body mechanics   [] transfers   [] heat/ice application    [] other:      Other Objective/Functional Measures: NA     Pain Level (0-10 scale) post treatment: 1/10    ASSESSMENT/Changes in Function:     Pt with frequent questions regarding technique for exercises; frequent compensation with elbow ext and wrist flex/ext during shldr ER ROM & strengthening exercises, responded well to verbal and tactile cues throughout     Patient will continue to benefit from skilled PT services to modify and progress therapeutic interventions, address functional mobility deficits, address ROM deficits, address strength deficits, analyze and address soft tissue restrictions, analyze and cue movement patterns, analyze and modify body mechanics/ergonomics, assess and modify postural abnormalities, and address imbalance/dizziness to attain remaining goals.      []  See Plan of Care  []  See progress note/recertification  []  See Discharge Summary         Progress towards goals / Updated goals:  Progressing toward    PLAN  [x]  Upgrade activities as tolerated     [x]  Continue plan of care  [x]  Update interventions per flow sheet       []  Discharge due to:_  []  Other:_      Jasvir Alejo, PT 4/18/2023

## 2023-04-20 ENCOUNTER — HOSPITAL ENCOUNTER (OUTPATIENT)
Dept: PHYSICAL THERAPY | Age: 68
Discharge: HOME | End: 2023-04-20
Payer: MEDICARE

## 2023-04-20 PROCEDURE — 97140 MANUAL THERAPY 1/> REGIONS: CPT

## 2023-04-20 PROCEDURE — 97110 THERAPEUTIC EXERCISES: CPT

## 2023-04-25 ENCOUNTER — APPOINTMENT (OUTPATIENT)
Dept: PHYSICAL THERAPY | Age: 68
End: 2023-04-25
Payer: MEDICARE

## 2023-04-27 ENCOUNTER — HOSPITAL ENCOUNTER (OUTPATIENT)
Dept: PHYSICAL THERAPY | Age: 68
Discharge: HOME | End: 2023-04-27
Payer: MEDICARE

## 2023-04-27 PROCEDURE — 97110 THERAPEUTIC EXERCISES: CPT | Performed by: PHYSICAL THERAPIST

## 2023-04-27 PROCEDURE — 97140 MANUAL THERAPY 1/> REGIONS: CPT | Performed by: PHYSICAL THERAPIST

## 2023-05-19 RX ORDER — ROSUVASTATIN CALCIUM 20 MG/1
1 TABLET, COATED ORAL NIGHTLY
COMMUNITY
Start: 2022-09-28

## 2023-05-19 RX ORDER — METOPROLOL SUCCINATE 25 MG/1
2 TABLET, EXTENDED RELEASE ORAL NIGHTLY
COMMUNITY
Start: 2023-02-25

## 2023-05-19 RX ORDER — TRAMADOL HYDROCHLORIDE 50 MG/1
TABLET ORAL
COMMUNITY

## 2023-05-19 RX ORDER — ACETAMINOPHEN 500 MG
1000 TABLET ORAL EVERY 6 HOURS
COMMUNITY
Start: 2023-02-23

## 2023-05-19 RX ORDER — AMIODARONE HYDROCHLORIDE 200 MG/1
TABLET ORAL
COMMUNITY
Start: 2023-04-11

## 2023-05-22 ENCOUNTER — TELEPHONE (OUTPATIENT)
Age: 68
End: 2023-05-22

## 2023-05-24 NOTE — TELEPHONE ENCOUNTER
Pt called. 2 identifiers confirmed. Notified per Dr. Frankey Puma to check with insurance. No further concerns voiced.

## 2023-09-14 RX ORDER — ROSUVASTATIN CALCIUM 20 MG/1
TABLET, COATED ORAL NIGHTLY
Qty: 90 TABLET | Refills: 3 | Status: SHIPPED | OUTPATIENT
Start: 2023-09-14

## 2024-05-22 ENCOUNTER — OFFICE VISIT (OUTPATIENT)
Age: 69
End: 2024-05-22
Payer: MEDICARE

## 2024-05-22 VITALS
WEIGHT: 184.4 LBS | TEMPERATURE: 98.2 F | SYSTOLIC BLOOD PRESSURE: 134 MMHG | RESPIRATION RATE: 14 BRPM | DIASTOLIC BLOOD PRESSURE: 88 MMHG | OXYGEN SATURATION: 98 % | BODY MASS INDEX: 27.95 KG/M2 | HEIGHT: 68 IN | HEART RATE: 61 BPM

## 2024-05-22 DIAGNOSIS — I50.22 CHRONIC SYSTOLIC (CONGESTIVE) HEART FAILURE (HCC): ICD-10-CM

## 2024-05-22 DIAGNOSIS — E53.8 VITAMIN B 12 DEFICIENCY: ICD-10-CM

## 2024-05-22 DIAGNOSIS — R73.03 PREDIABETES: ICD-10-CM

## 2024-05-22 DIAGNOSIS — E55.9 VITAMIN D DEFICIENCY: ICD-10-CM

## 2024-05-22 DIAGNOSIS — R53.82 CHRONIC FATIGUE: ICD-10-CM

## 2024-05-22 DIAGNOSIS — N18.31 STAGE 3A CHRONIC KIDNEY DISEASE (HCC): ICD-10-CM

## 2024-05-22 DIAGNOSIS — Z12.5 PROSTATE CANCER SCREENING: ICD-10-CM

## 2024-05-22 DIAGNOSIS — E78.2 MIXED HYPERLIPIDEMIA: ICD-10-CM

## 2024-05-22 DIAGNOSIS — I48.0 PAROXYSMAL ATRIAL FIBRILLATION (HCC): ICD-10-CM

## 2024-05-22 DIAGNOSIS — Z00.00 MEDICARE ANNUAL WELLNESS VISIT, SUBSEQUENT: Primary | ICD-10-CM

## 2024-05-22 LAB
COMMENT:: NORMAL
SPECIMEN HOLD: NORMAL

## 2024-05-22 PROCEDURE — 1036F TOBACCO NON-USER: CPT | Performed by: INTERNAL MEDICINE

## 2024-05-22 PROCEDURE — 99213 OFFICE O/P EST LOW 20 MIN: CPT | Performed by: INTERNAL MEDICINE

## 2024-05-22 PROCEDURE — 3017F COLORECTAL CA SCREEN DOC REV: CPT | Performed by: INTERNAL MEDICINE

## 2024-05-22 PROCEDURE — G8427 DOCREV CUR MEDS BY ELIG CLIN: HCPCS | Performed by: INTERNAL MEDICINE

## 2024-05-22 PROCEDURE — G0439 PPPS, SUBSEQ VISIT: HCPCS | Performed by: INTERNAL MEDICINE

## 2024-05-22 PROCEDURE — 1123F ACP DISCUSS/DSCN MKR DOCD: CPT | Performed by: INTERNAL MEDICINE

## 2024-05-22 PROCEDURE — G8419 CALC BMI OUT NRM PARAM NOF/U: HCPCS | Performed by: INTERNAL MEDICINE

## 2024-05-22 SDOH — ECONOMIC STABILITY: FOOD INSECURITY: WITHIN THE PAST 12 MONTHS, YOU WORRIED THAT YOUR FOOD WOULD RUN OUT BEFORE YOU GOT MONEY TO BUY MORE.: NEVER TRUE

## 2024-05-22 SDOH — ECONOMIC STABILITY: HOUSING INSECURITY
IN THE LAST 12 MONTHS, WAS THERE A TIME WHEN YOU DID NOT HAVE A STEADY PLACE TO SLEEP OR SLEPT IN A SHELTER (INCLUDING NOW)?: NO

## 2024-05-22 SDOH — ECONOMIC STABILITY: FOOD INSECURITY: WITHIN THE PAST 12 MONTHS, THE FOOD YOU BOUGHT JUST DIDN'T LAST AND YOU DIDN'T HAVE MONEY TO GET MORE.: NEVER TRUE

## 2024-05-22 SDOH — ECONOMIC STABILITY: INCOME INSECURITY: HOW HARD IS IT FOR YOU TO PAY FOR THE VERY BASICS LIKE FOOD, HOUSING, MEDICAL CARE, AND HEATING?: NOT HARD AT ALL

## 2024-05-22 ASSESSMENT — LIFESTYLE VARIABLES
HOW MANY STANDARD DRINKS CONTAINING ALCOHOL DO YOU HAVE ON A TYPICAL DAY: PATIENT DOES NOT DRINK
HOW OFTEN DO YOU HAVE A DRINK CONTAINING ALCOHOL: NEVER

## 2024-05-22 ASSESSMENT — PATIENT HEALTH QUESTIONNAIRE - PHQ9
SUM OF ALL RESPONSES TO PHQ QUESTIONS 1-9: 0
SUM OF ALL RESPONSES TO PHQ QUESTIONS 1-9: 0
SUM OF ALL RESPONSES TO PHQ9 QUESTIONS 1 & 2: 0
2. FEELING DOWN, DEPRESSED OR HOPELESS: NOT AT ALL
1. LITTLE INTEREST OR PLEASURE IN DOING THINGS: NOT AT ALL
SUM OF ALL RESPONSES TO PHQ QUESTIONS 1-9: 0
SUM OF ALL RESPONSES TO PHQ QUESTIONS 1-9: 0

## 2024-05-22 NOTE — PROGRESS NOTES
\"Have you been to the ER, urgent care clinic since your last visit?  Hospitalized since your last visit?\"    NO    “Have you seen or consulted any other health care providers outside of [facility] since your last visit?”    NO            Click Here for Release of Records Request  
      Social History     Socioeconomic History    Marital status:      Spouse name: Not on file    Number of children: Not on file    Years of education: Not on file    Highest education level: Not on file   Occupational History    Not on file   Tobacco Use    Smoking status: Never    Smokeless tobacco: Never   Substance and Sexual Activity    Alcohol use: Not Currently    Drug use: Never    Sexual activity: Yes     Partners: Female   Other Topics Concern    Not on file   Social History Narrative    Not on file     Social Determinants of Health     Financial Resource Strain: Low Risk  (5/22/2024)    Overall Financial Resource Strain (CARDIA)     Difficulty of Paying Living Expenses: Not hard at all   Food Insecurity: No Food Insecurity (5/22/2024)    Hunger Vital Sign     Worried About Running Out of Food in the Last Year: Never true     Ran Out of Food in the Last Year: Never true   Transportation Needs: Unknown (5/22/2024)    PRAPARE - Transportation     Lack of Transportation (Medical): Not on file     Lack of Transportation (Non-Medical): No   Physical Activity: Insufficiently Active (5/22/2024)    Exercise Vital Sign     Days of Exercise per Week: 3 days     Minutes of Exercise per Session: 40 min   Stress: Not on file   Social Connections: Not on file   Intimate Partner Violence: Not on file   Housing Stability: Unknown (5/22/2024)    Housing Stability Vital Sign     Unable to Pay for Housing in the Last Year: Not on file     Number of Places Lived in the Last Year: Not on file     Unstable Housing in the Last Year: No          /88 (Site: Left Upper Arm, Position: Sitting, Cuff Size: Large Adult)   Pulse 61   Temp 98.2 °F (36.8 °C) (Temporal)   Resp 14   Ht 1.727 m (5' 8\")   Wt 83.6 kg (184 lb 6.4 oz)   SpO2 98%   BMI 28.04 kg/m²     Physical Examination:   General - Well appearing male  HEENT - PERRL, TM no erythema/opacification, normal nasal turbinates, no oropharyngeal erythema or

## 2024-05-23 LAB
25(OH)D3 SERPL-MCNC: 21.5 NG/ML (ref 30–100)
ALBUMIN SERPL-MCNC: 4.2 G/DL (ref 3.5–5)
ALBUMIN/GLOB SERPL: 1.2 (ref 1.1–2.2)
ALP SERPL-CCNC: 82 U/L (ref 45–117)
ALT SERPL-CCNC: 26 U/L (ref 12–78)
ANION GAP SERPL CALC-SCNC: 4 MMOL/L (ref 5–15)
AST SERPL-CCNC: 20 U/L (ref 15–37)
BASOPHILS # BLD: 0.1 K/UL (ref 0–0.1)
BASOPHILS NFR BLD: 1 % (ref 0–1)
BILIRUB SERPL-MCNC: 1 MG/DL (ref 0.2–1)
BUN SERPL-MCNC: 19 MG/DL (ref 6–20)
BUN/CREAT SERPL: 15 (ref 12–20)
CALCIUM SERPL-MCNC: 10 MG/DL (ref 8.5–10.1)
CHLORIDE SERPL-SCNC: 108 MMOL/L (ref 97–108)
CHOLEST SERPL-MCNC: 203 MG/DL
CO2 SERPL-SCNC: 28 MMOL/L (ref 21–32)
CREAT SERPL-MCNC: 1.3 MG/DL (ref 0.7–1.3)
DIFFERENTIAL METHOD BLD: NORMAL
EOSINOPHIL # BLD: 0.1 K/UL (ref 0–0.4)
EOSINOPHIL NFR BLD: 2 % (ref 0–7)
ERYTHROCYTE [DISTWIDTH] IN BLOOD BY AUTOMATED COUNT: 12.2 % (ref 11.5–14.5)
EST. AVERAGE GLUCOSE BLD GHB EST-MCNC: 117 MG/DL
GLOBULIN SER CALC-MCNC: 3.4 G/DL (ref 2–4)
GLUCOSE SERPL-MCNC: 105 MG/DL (ref 65–100)
HBA1C MFR BLD: 5.7 % (ref 4–5.6)
HCT VFR BLD AUTO: 46.7 % (ref 36.6–50.3)
HDLC SERPL-MCNC: 56 MG/DL
HDLC SERPL: 3.6 (ref 0–5)
HGB BLD-MCNC: 14.4 G/DL (ref 12.1–17)
IMM GRANULOCYTES # BLD AUTO: 0 K/UL (ref 0–0.04)
IMM GRANULOCYTES NFR BLD AUTO: 0 % (ref 0–0.5)
LDLC SERPL CALC-MCNC: 114.6 MG/DL (ref 0–100)
LYMPHOCYTES # BLD: 2.4 K/UL (ref 0.8–3.5)
LYMPHOCYTES NFR BLD: 42 % (ref 12–49)
MCH RBC QN AUTO: 29.1 PG (ref 26–34)
MCHC RBC AUTO-ENTMCNC: 30.8 G/DL (ref 30–36.5)
MCV RBC AUTO: 94.5 FL (ref 80–99)
MONOCYTES # BLD: 0.5 K/UL (ref 0–1)
MONOCYTES NFR BLD: 9 % (ref 5–13)
NEUTS SEG # BLD: 2.7 K/UL (ref 1.8–8)
NEUTS SEG NFR BLD: 46 % (ref 32–75)
NRBC # BLD: 0 K/UL (ref 0–0.01)
NRBC BLD-RTO: 0 PER 100 WBC
PLATELET # BLD AUTO: 168 K/UL (ref 150–400)
PMV BLD AUTO: 12.5 FL (ref 8.9–12.9)
POTASSIUM SERPL-SCNC: 5 MMOL/L (ref 3.5–5.1)
PROT SERPL-MCNC: 7.6 G/DL (ref 6.4–8.2)
PSA SERPL-MCNC: 1.2 NG/ML (ref 0.01–4)
RBC # BLD AUTO: 4.94 M/UL (ref 4.1–5.7)
SODIUM SERPL-SCNC: 140 MMOL/L (ref 136–145)
TRIGL SERPL-MCNC: 162 MG/DL
TSH SERPL DL<=0.05 MIU/L-ACNC: 2.09 UIU/ML (ref 0.36–3.74)
VIT B12 SERPL-MCNC: 378 PG/ML (ref 193–986)
VLDLC SERPL CALC-MCNC: 32.4 MG/DL
WBC # BLD AUTO: 5.8 K/UL (ref 4.1–11.1)

## 2024-05-23 RX ORDER — CHOLECALCIFEROL (VITAMIN D3) 125 MCG
5000 CAPSULE ORAL
Qty: 90 CAPSULE | Refills: 3 | Status: SHIPPED | OUTPATIENT
Start: 2024-05-23

## 2024-05-28 NOTE — RESULT ENCOUNTER NOTE
Patient notified of results and response from Ethan Lopez III, DO:    \"Vit d is low.  Rx sent in for daily replacement. Cholesterol levels dramatically better. Continue statin. Kidney function improved. No need for farxiga or jardiance at this time. Other labs look good.\"    Pt verbalized understanding.

## 2024-08-20 ENCOUNTER — HOSPITAL ENCOUNTER (INPATIENT)
Facility: HOSPITAL | Age: 69
LOS: 1 days | Discharge: HOME | DRG: 274 | End: 2024-08-21
Attending: EMERGENCY MEDICINE | Admitting: INTERNAL MEDICINE
Payer: MEDICARE

## 2024-08-20 DIAGNOSIS — I48.4 ATYPICAL ATRIAL FLUTTER (HCC): ICD-10-CM

## 2024-08-20 DIAGNOSIS — I48.92 ATRIAL FLUTTER (HCC): ICD-10-CM

## 2024-08-20 DIAGNOSIS — I48.92 ATRIAL FLUTTER WITH RAPID VENTRICULAR RESPONSE (HCC): Primary | ICD-10-CM

## 2024-08-20 LAB
ALBUMIN SERPL-MCNC: 3.8 G/DL (ref 3.5–5)
ALBUMIN/GLOB SERPL: 1.2 (ref 1.1–2.2)
ALP SERPL-CCNC: 74 U/L (ref 45–117)
ALT SERPL-CCNC: 23 U/L (ref 12–78)
ANION GAP SERPL CALC-SCNC: 6 MMOL/L (ref 5–15)
AST SERPL-CCNC: 16 U/L (ref 15–37)
BASOPHILS # BLD: 0.1 K/UL (ref 0–0.1)
BASOPHILS NFR BLD: 1 % (ref 0–1)
BILIRUB SERPL-MCNC: 0.8 MG/DL (ref 0.2–1)
BUN SERPL-MCNC: 22 MG/DL (ref 6–20)
BUN/CREAT SERPL: 17 (ref 12–20)
CALCIUM SERPL-MCNC: 8.8 MG/DL (ref 8.5–10.1)
CHLORIDE SERPL-SCNC: 110 MMOL/L (ref 97–108)
CO2 SERPL-SCNC: 25 MMOL/L (ref 21–32)
CREAT SERPL-MCNC: 1.33 MG/DL (ref 0.7–1.3)
DIFFERENTIAL METHOD BLD: NORMAL
EOSINOPHIL # BLD: 0.2 K/UL (ref 0–0.4)
EOSINOPHIL NFR BLD: 2 % (ref 0–7)
ERYTHROCYTE [DISTWIDTH] IN BLOOD BY AUTOMATED COUNT: 12.6 % (ref 11.5–14.5)
GLOBULIN SER CALC-MCNC: 3.1 G/DL (ref 2–4)
GLUCOSE SERPL-MCNC: 106 MG/DL (ref 65–100)
HCT VFR BLD AUTO: 39.4 % (ref 36.6–50.3)
HGB BLD-MCNC: 13 G/DL (ref 12.1–17)
IMM GRANULOCYTES # BLD AUTO: 0 K/UL (ref 0–0.04)
IMM GRANULOCYTES NFR BLD AUTO: 0 % (ref 0–0.5)
LYMPHOCYTES # BLD: 2.6 K/UL (ref 0.8–3.5)
LYMPHOCYTES NFR BLD: 33 % (ref 12–49)
MAGNESIUM SERPL-MCNC: 2.3 MG/DL (ref 1.6–2.4)
MCH RBC QN AUTO: 30.3 PG (ref 26–34)
MCHC RBC AUTO-ENTMCNC: 33 G/DL (ref 30–36.5)
MCV RBC AUTO: 91.8 FL (ref 80–99)
MONOCYTES # BLD: 0.6 K/UL (ref 0–1)
MONOCYTES NFR BLD: 8 % (ref 5–13)
NEUTS SEG # BLD: 4.4 K/UL (ref 1.8–8)
NEUTS SEG NFR BLD: 56 % (ref 32–75)
NRBC # BLD: 0 K/UL (ref 0–0.01)
NRBC BLD-RTO: 0 PER 100 WBC
NT PRO BNP: 1819 PG/ML
PLATELET # BLD AUTO: 165 K/UL (ref 150–400)
PMV BLD AUTO: 12.5 FL (ref 8.9–12.9)
POTASSIUM SERPL-SCNC: 4.4 MMOL/L (ref 3.5–5.1)
PROT SERPL-MCNC: 6.9 G/DL (ref 6.4–8.2)
RBC # BLD AUTO: 4.29 M/UL (ref 4.1–5.7)
SODIUM SERPL-SCNC: 141 MMOL/L (ref 136–145)
TROPONIN I SERPL HS-MCNC: 14 NG/L (ref 0–76)
WBC # BLD AUTO: 7.8 K/UL (ref 4.1–11.1)

## 2024-08-20 PROCEDURE — 84484 ASSAY OF TROPONIN QUANT: CPT

## 2024-08-20 PROCEDURE — 96374 THER/PROPH/DIAG INJ IV PUSH: CPT

## 2024-08-20 PROCEDURE — 93005 ELECTROCARDIOGRAM TRACING: CPT | Performed by: EMERGENCY MEDICINE

## 2024-08-20 PROCEDURE — 99285 EMERGENCY DEPT VISIT HI MDM: CPT

## 2024-08-20 PROCEDURE — 85025 COMPLETE CBC W/AUTO DIFF WBC: CPT

## 2024-08-20 PROCEDURE — 6370000000 HC RX 637 (ALT 250 FOR IP): Performed by: INTERNAL MEDICINE

## 2024-08-20 PROCEDURE — 2060000000 HC ICU INTERMEDIATE R&B

## 2024-08-20 PROCEDURE — 80053 COMPREHEN METABOLIC PANEL: CPT

## 2024-08-20 PROCEDURE — 2500000003 HC RX 250 WO HCPCS: Performed by: EMERGENCY MEDICINE

## 2024-08-20 PROCEDURE — 83880 ASSAY OF NATRIURETIC PEPTIDE: CPT

## 2024-08-20 PROCEDURE — 2580000003 HC RX 258: Performed by: EMERGENCY MEDICINE

## 2024-08-20 PROCEDURE — 36415 COLL VENOUS BLD VENIPUNCTURE: CPT

## 2024-08-20 PROCEDURE — 2580000003 HC RX 258: Performed by: INTERNAL MEDICINE

## 2024-08-20 PROCEDURE — 83735 ASSAY OF MAGNESIUM: CPT

## 2024-08-20 RX ORDER — ACETAMINOPHEN 325 MG/1
650 TABLET ORAL EVERY 6 HOURS PRN
Status: DISCONTINUED | OUTPATIENT
Start: 2024-08-20 | End: 2024-08-21 | Stop reason: HOSPADM

## 2024-08-20 RX ORDER — DILTIAZEM HYDROCHLORIDE 5 MG/ML
10 INJECTION INTRAVENOUS
Status: COMPLETED | OUTPATIENT
Start: 2024-08-20 | End: 2024-08-20

## 2024-08-20 RX ORDER — SODIUM CHLORIDE 0.9 % (FLUSH) 0.9 %
5-40 SYRINGE (ML) INJECTION PRN
Status: DISCONTINUED | OUTPATIENT
Start: 2024-08-20 | End: 2024-08-21 | Stop reason: HOSPADM

## 2024-08-20 RX ORDER — ENOXAPARIN SODIUM 100 MG/ML
40 INJECTION SUBCUTANEOUS DAILY
Status: DISCONTINUED | OUTPATIENT
Start: 2024-08-20 | End: 2024-08-21 | Stop reason: HOSPADM

## 2024-08-20 RX ORDER — SODIUM CHLORIDE 9 MG/ML
INJECTION, SOLUTION INTRAVENOUS PRN
Status: DISCONTINUED | OUTPATIENT
Start: 2024-08-20 | End: 2024-08-21 | Stop reason: HOSPADM

## 2024-08-20 RX ORDER — ONDANSETRON 2 MG/ML
4 INJECTION INTRAMUSCULAR; INTRAVENOUS EVERY 4 HOURS PRN
Status: DISCONTINUED | OUTPATIENT
Start: 2024-08-20 | End: 2024-08-21 | Stop reason: HOSPADM

## 2024-08-20 RX ORDER — ONDANSETRON 4 MG/1
4 TABLET, ORALLY DISINTEGRATING ORAL EVERY 8 HOURS PRN
Status: DISCONTINUED | OUTPATIENT
Start: 2024-08-20 | End: 2024-08-21 | Stop reason: HOSPADM

## 2024-08-20 RX ORDER — ROSUVASTATIN CALCIUM 20 MG/1
20 TABLET, COATED ORAL NIGHTLY
Status: DISCONTINUED | OUTPATIENT
Start: 2024-08-20 | End: 2024-08-21 | Stop reason: HOSPADM

## 2024-08-20 RX ORDER — ONDANSETRON 2 MG/ML
4 INJECTION INTRAMUSCULAR; INTRAVENOUS EVERY 6 HOURS PRN
Status: DISCONTINUED | OUTPATIENT
Start: 2024-08-20 | End: 2024-08-21 | Stop reason: HOSPADM

## 2024-08-20 RX ORDER — ACETAMINOPHEN 650 MG/1
650 SUPPOSITORY RECTAL EVERY 6 HOURS PRN
Status: DISCONTINUED | OUTPATIENT
Start: 2024-08-20 | End: 2024-08-21 | Stop reason: HOSPADM

## 2024-08-20 RX ORDER — POLYETHYLENE GLYCOL 3350 17 G/17G
17 POWDER, FOR SOLUTION ORAL DAILY PRN
Status: DISCONTINUED | OUTPATIENT
Start: 2024-08-20 | End: 2024-08-21 | Stop reason: HOSPADM

## 2024-08-20 RX ORDER — SODIUM CHLORIDE 0.9 % (FLUSH) 0.9 %
5-40 SYRINGE (ML) INJECTION EVERY 12 HOURS SCHEDULED
Status: DISCONTINUED | OUTPATIENT
Start: 2024-08-20 | End: 2024-08-21 | Stop reason: HOSPADM

## 2024-08-20 RX ADMIN — ROSUVASTATIN 20 MG: 20 TABLET, FILM COATED ORAL at 22:03

## 2024-08-20 RX ADMIN — DILTIAZEM HYDROCHLORIDE 10 MG: 5 INJECTION, SOLUTION INTRAVENOUS at 15:40

## 2024-08-20 RX ADMIN — SODIUM CHLORIDE, PRESERVATIVE FREE 10 ML: 5 INJECTION INTRAVENOUS at 22:03

## 2024-08-20 RX ADMIN — SODIUM CHLORIDE 5 MG/HR: 900 INJECTION, SOLUTION INTRAVENOUS at 15:43

## 2024-08-20 RX ADMIN — ACETAMINOPHEN 650 MG: 325 TABLET ORAL at 22:18

## 2024-08-20 ASSESSMENT — PAIN SCALES - GENERAL: PAINLEVEL_OUTOF10: 0

## 2024-08-20 ASSESSMENT — LIFESTYLE VARIABLES
HOW OFTEN DO YOU HAVE A DRINK CONTAINING ALCOHOL: 2-4 TIMES A MONTH
HOW MANY STANDARD DRINKS CONTAINING ALCOHOL DO YOU HAVE ON A TYPICAL DAY: 1 OR 2

## 2024-08-20 ASSESSMENT — PAIN DESCRIPTION - LOCATION: LOCATION: HEAD

## 2024-08-20 ASSESSMENT — PAIN - FUNCTIONAL ASSESSMENT: PAIN_FUNCTIONAL_ASSESSMENT: ACTIVITIES ARE NOT PREVENTED

## 2024-08-20 ASSESSMENT — PAIN DESCRIPTION - ORIENTATION: ORIENTATION: ANTERIOR

## 2024-08-20 ASSESSMENT — PAIN DESCRIPTION - DESCRIPTORS: DESCRIPTORS: ACHING

## 2024-08-20 NOTE — ED PROVIDER NOTES
[facility] EMERGENCY DEPT  EMERGENCY DEPARTMENT ENCOUNTER       Pt Name: Max Alcaraz  MRN: [id number]  Birthdate 1955  Date of evaluation: 8/20/2024  Provider: Lawrence Hopper MD   PCP: Ethan Lopez III, DO  Note Started: 5:04 PM 8/20/24     CHIEF COMPLAINT       Chief Complaint   Patient presents with    Tachycardia     Patient was sent over from his heart doctor due to his heart rate being too high. Patient states he can feel his heart racing and is a little bit more tired than usual, but has no other symptoms. Pt reports history of oblation in march of this year due to his history of AFIB        HISTORY OF PRESENT ILLNESS: 1 or more elements      History From: Patient, wife, History limited by: No limitations     Max Alcaraz is a 68 y.o. male with history of atrial flutter, sigmoid colon cancer, CHF, diabetes, hypertension, hypercholesterolemia, recent a flutter ablation in March 2024who presents with chief complaint of rapid heart rate.  Patient endorses palpitations, lightheadedness.  Symptoms have been present over the past 3 to 7 days.  He does not know when symptoms onset.  Patient went to his cardiologist office and was referred to the ED for atrial flutter with rapid ventricular response and need for admission for repeat ablation.     Nursing Notes were all reviewed and agreed with or any disagreements were addressed in the HPI.     REVIEW OF SYSTEMS        Positives and Pertinent negatives as per HPI.    PAST HISTORY     Past Medical History:  Past Medical History:   Diagnosis Date    Arthritis     Atrial fibrillation (HCC)     Cancer (HCC)     sigmoid colon    Congestive heart failure (HCC)     Diabetes (HCC)     prediabetic    Hypercholesterolemia     Hypertension        Past Surgical History:  Past Surgical History:   Procedure Laterality Date    APPENDECTOMY  2011    CATARACT REMOVAL Bilateral     HERNIA REPAIR      TOTAL COLECTOMY  09/2013    cancer       Family History:  Family History

## 2024-08-20 NOTE — ED NOTES
TRANSFER - OUT REPORT:    Verbal report given to Juanis RABAGO on Max Alcaraz  being transferred to 2217 for routine progression of care    Report consisted of patient's Situation, Background, Assessment and   Recommendations(SBAR).     Information from the following report(s) Nurse Handoff Report, ED Encounter Summary, ED SBAR, Adult Overview, MAR, Cardiac Rhythm  , Neuro Assessment, and Event Log was reviewed with the receiving nurse.    Charlotte Fall Assessment:    Presents to emergency department  because of falls (Syncope, seizure, or loss of consciousness): No  Age > 70: No  Altered Mental Status, Intoxication with alcohol or substance confusion (Disorientation, impaired judgment, poor safety awaremess, or inability to follow instructions): No  Impaired Mobility: Ambulates or transfers with assistive devices or assistance; Unable to ambulate or transer.: No  Nursing Judgement: No          Lines:   Peripheral IV 08/20/24 Distal;Left;Anterior Cephalic (Active)       Peripheral IV 08/20/24 Right Antecubital (Active)        Opportunity for questions and clarification was provided.      Patient transported with:  Monitor and Registered Nurse

## 2024-08-20 NOTE — H&P
Collection Time: 08/20/24  3:42 PM   Result Value Ref Range    WBC 7.8 4.1 - 11.1 K/uL    RBC 4.29 4.10 - 5.70 M/uL    Hemoglobin 13.0 12.1 - 17.0 g/dL    Hematocrit 39.4 36.6 - 50.3 %    MCV 91.8 80.0 - 99.0 FL    MCH 30.3 26.0 - 34.0 PG    MCHC 33.0 30.0 - 36.5 g/dL    RDW 12.6 11.5 - 14.5 %    Platelets 165 150 - 400 K/uL    MPV 12.5 8.9 - 12.9 FL    Nucleated RBCs 0.0 0  WBC    nRBC 0.00 0.00 - 0.01 K/uL    Neutrophils % 56 32 - 75 %    Lymphocytes % 33 12 - 49 %    Monocytes % 8 5 - 13 %    Eosinophils % 2 0 - 7 %    Basophils % 1 0 - 1 %    Immature Granulocytes % 0 0.0 - 0.5 %    Neutrophils Absolute 4.4 1.8 - 8.0 K/UL    Lymphocytes Absolute 2.6 0.8 - 3.5 K/UL    Monocytes Absolute 0.6 0.0 - 1.0 K/UL    Eosinophils Absolute 0.2 0.0 - 0.4 K/UL    Basophils Absolute 0.1 0.0 - 0.1 K/UL    Immature Granulocytes Absolute 0.0 0.00 - 0.04 K/UL    Differential Type AUTOMATED     Comprehensive Metabolic Panel    Collection Time: 08/20/24  3:42 PM   Result Value Ref Range    Sodium 141 136 - 145 mmol/L    Potassium 4.4 3.5 - 5.1 mmol/L    Chloride 110 (H) 97 - 108 mmol/L    CO2 25 21 - 32 mmol/L    Anion Gap 6 5 - 15 mmol/L    Glucose 106 (H) 65 - 100 mg/dL    BUN 22 (H) 6 - 20 MG/DL    Creatinine 1.33 (H) 0.70 - 1.30 MG/DL    BUN/Creatinine Ratio 17 12 - 20      Est, Glom Filt Rate 59 (L) >60 ml/min/1.73m2    Calcium 8.8 8.5 - 10.1 MG/DL    Total Bilirubin 0.8 0.2 - 1.0 MG/DL    ALT 23 12 - 78 U/L    AST 16 15 - 37 U/L    Alk Phosphatase 74 45 - 117 U/L    Total Protein 6.9 6.4 - 8.2 g/dL    Albumin 3.8 3.5 - 5.0 g/dL    Globulin 3.1 2.0 - 4.0 g/dL    Albumin/Globulin Ratio 1.2 1.1 - 2.2     Troponin    Collection Time: 08/20/24  3:42 PM   Result Value Ref Range    Troponin, High Sensitivity 14 0 - 76 ng/L   Magnesium    Collection Time: 08/20/24  3:42 PM   Result Value Ref Range    Magnesium 2.3 1.6 - 2.4 mg/dL   Brain Natriuretic Peptide    Collection Time: 08/20/24  3:42 PM   Result Value Ref Range    NT

## 2024-08-21 ENCOUNTER — ANESTHESIA (OUTPATIENT)
Facility: HOSPITAL | Age: 69
DRG: 274 | End: 2024-08-21
Payer: MEDICARE

## 2024-08-21 ENCOUNTER — ANESTHESIA EVENT (OUTPATIENT)
Facility: HOSPITAL | Age: 69
DRG: 274 | End: 2024-08-21
Payer: MEDICARE

## 2024-08-21 VITALS
DIASTOLIC BLOOD PRESSURE: 83 MMHG | WEIGHT: 183.42 LBS | TEMPERATURE: 97.6 F | RESPIRATION RATE: 14 BRPM | BODY MASS INDEX: 27.8 KG/M2 | HEIGHT: 68 IN | OXYGEN SATURATION: 96 % | SYSTOLIC BLOOD PRESSURE: 115 MMHG | HEART RATE: 87 BPM

## 2024-08-21 LAB
ACT BLD: 269 SECS (ref 79–138)
ANION GAP SERPL CALC-SCNC: 5 MMOL/L (ref 5–15)
BUN SERPL-MCNC: 18 MG/DL (ref 6–20)
BUN/CREAT SERPL: 14 (ref 12–20)
CALCIUM SERPL-MCNC: 8.9 MG/DL (ref 8.5–10.1)
CHLORIDE SERPL-SCNC: 112 MMOL/L (ref 97–108)
CO2 SERPL-SCNC: 24 MMOL/L (ref 21–32)
CREAT SERPL-MCNC: 1.26 MG/DL (ref 0.7–1.3)
EKG ATRIAL RATE: 153 BPM
EKG DIAGNOSIS: NORMAL
EKG P AXIS: 77 DEGREES
EKG P-R INTERVAL: 130 MS
EKG Q-T INTERVAL: 266 MS
EKG QRS DURATION: 82 MS
EKG QTC CALCULATION (BAZETT): 424 MS
EKG R AXIS: 59 DEGREES
EKG T AXIS: 119 DEGREES
EKG VENTRICULAR RATE: 153 BPM
GLUCOSE SERPL-MCNC: 91 MG/DL (ref 65–100)
MAGNESIUM SERPL-MCNC: 2.3 MG/DL (ref 1.6–2.4)
PHOSPHATE SERPL-MCNC: 3.6 MG/DL (ref 2.6–4.7)
POTASSIUM SERPL-SCNC: 3.9 MMOL/L (ref 3.5–5.1)
SODIUM SERPL-SCNC: 141 MMOL/L (ref 136–145)

## 2024-08-21 PROCEDURE — 2580000003 HC RX 258: Performed by: NURSE ANESTHETIST, CERTIFIED REGISTERED

## 2024-08-21 PROCEDURE — 2580000003 HC RX 258: Performed by: INTERNAL MEDICINE

## 2024-08-21 PROCEDURE — 2500000003 HC RX 250 WO HCPCS: Performed by: NURSE ANESTHETIST, CERTIFIED REGISTERED

## 2024-08-21 PROCEDURE — 85347 COAGULATION TIME ACTIVATED: CPT

## 2024-08-21 PROCEDURE — 6360000002 HC RX W HCPCS: Performed by: NURSE ANESTHETIST, CERTIFIED REGISTERED

## 2024-08-21 PROCEDURE — C1759 CATH, INTRA ECHOCARDIOGRAPHY: HCPCS | Performed by: INTERNAL MEDICINE

## 2024-08-21 PROCEDURE — 80048 BASIC METABOLIC PNL TOTAL CA: CPT

## 2024-08-21 PROCEDURE — 93657 TX L/R ATRIAL FIB ADDL: CPT | Performed by: INTERNAL MEDICINE

## 2024-08-21 PROCEDURE — 2709999900 HC NON-CHARGEABLE SUPPLY: Performed by: INTERNAL MEDICINE

## 2024-08-21 PROCEDURE — C1769 GUIDE WIRE: HCPCS | Performed by: INTERNAL MEDICINE

## 2024-08-21 PROCEDURE — C1730 CATH, EP, 19 OR FEW ELECT: HCPCS | Performed by: INTERNAL MEDICINE

## 2024-08-21 PROCEDURE — 2500000003 HC RX 250 WO HCPCS: Performed by: INTERNAL MEDICINE

## 2024-08-21 PROCEDURE — C1733 CATH, EP, OTHR THAN COOL-TIP: HCPCS | Performed by: INTERNAL MEDICINE

## 2024-08-21 PROCEDURE — 83735 ASSAY OF MAGNESIUM: CPT

## 2024-08-21 PROCEDURE — 4A0234Z MEASUREMENT OF CARDIAC ELECTRICAL ACTIVITY, PERCUTANEOUS APPROACH: ICD-10-PCS | Performed by: INTERNAL MEDICINE

## 2024-08-21 PROCEDURE — C1732 CATH, EP, DIAG/ABL, 3D/VECT: HCPCS | Performed by: INTERNAL MEDICINE

## 2024-08-21 PROCEDURE — 84100 ASSAY OF PHOSPHORUS: CPT

## 2024-08-21 PROCEDURE — 93656 COMPRE EP EVAL ABLTJ ATR FIB: CPT | Performed by: INTERNAL MEDICINE

## 2024-08-21 PROCEDURE — 2580000003 HC RX 258: Performed by: EMERGENCY MEDICINE

## 2024-08-21 PROCEDURE — 3700000001 HC ADD 15 MINUTES (ANESTHESIA): Performed by: INTERNAL MEDICINE

## 2024-08-21 PROCEDURE — C1731 CATH, EP, 20 OR MORE ELEC: HCPCS | Performed by: INTERNAL MEDICINE

## 2024-08-21 PROCEDURE — 3700000000 HC ANESTHESIA ATTENDED CARE: Performed by: INTERNAL MEDICINE

## 2024-08-21 PROCEDURE — 02583ZZ DESTRUCTION OF CONDUCTION MECHANISM, PERCUTANEOUS APPROACH: ICD-10-PCS | Performed by: INTERNAL MEDICINE

## 2024-08-21 PROCEDURE — 2720000010 HC SURG SUPPLY STERILE: Performed by: INTERNAL MEDICINE

## 2024-08-21 PROCEDURE — 2500000003 HC RX 250 WO HCPCS: Performed by: EMERGENCY MEDICINE

## 2024-08-21 PROCEDURE — C1766 INTRO/SHEATH,STRBLE,NON-PEEL: HCPCS | Performed by: INTERNAL MEDICINE

## 2024-08-21 PROCEDURE — C1894 INTRO/SHEATH, NON-LASER: HCPCS | Performed by: INTERNAL MEDICINE

## 2024-08-21 PROCEDURE — 36415 COLL VENOUS BLD VENIPUNCTURE: CPT

## 2024-08-21 PROCEDURE — 4A023FZ MEASUREMENT OF CARDIAC RHYTHM, PERCUTANEOUS APPROACH: ICD-10-PCS | Performed by: INTERNAL MEDICINE

## 2024-08-21 RX ORDER — FENTANYL CITRATE 50 UG/ML
INJECTION, SOLUTION INTRAMUSCULAR; INTRAVENOUS PRN
Status: DISCONTINUED | OUTPATIENT
Start: 2024-08-21 | End: 2024-08-21 | Stop reason: SDUPTHER

## 2024-08-21 RX ORDER — SODIUM CHLORIDE, SODIUM LACTATE, POTASSIUM CHLORIDE, CALCIUM CHLORIDE 600; 310; 30; 20 MG/100ML; MG/100ML; MG/100ML; MG/100ML
INJECTION, SOLUTION INTRAVENOUS CONTINUOUS PRN
Status: DISCONTINUED | OUTPATIENT
Start: 2024-08-21 | End: 2024-08-21 | Stop reason: SDUPTHER

## 2024-08-21 RX ORDER — HYDRALAZINE HYDROCHLORIDE 20 MG/ML
10 INJECTION INTRAMUSCULAR; INTRAVENOUS
Status: CANCELLED | OUTPATIENT
Start: 2024-08-21

## 2024-08-21 RX ORDER — HYDROMORPHONE HYDROCHLORIDE 1 MG/ML
0.25 INJECTION, SOLUTION INTRAMUSCULAR; INTRAVENOUS; SUBCUTANEOUS EVERY 5 MIN PRN
Status: CANCELLED | OUTPATIENT
Start: 2024-08-21

## 2024-08-21 RX ORDER — SODIUM CHLORIDE 0.9 % (FLUSH) 0.9 %
5-40 SYRINGE (ML) INJECTION PRN
Status: CANCELLED | OUTPATIENT
Start: 2024-08-21

## 2024-08-21 RX ORDER — MIDAZOLAM HYDROCHLORIDE 1 MG/ML
INJECTION INTRAMUSCULAR; INTRAVENOUS PRN
Status: DISCONTINUED | OUTPATIENT
Start: 2024-08-21 | End: 2024-08-21 | Stop reason: SDUPTHER

## 2024-08-21 RX ORDER — METOPROLOL SUCCINATE 25 MG/1
25 TABLET, EXTENDED RELEASE ORAL DAILY
COMMUNITY

## 2024-08-21 RX ORDER — NALOXONE HYDROCHLORIDE 0.4 MG/ML
INJECTION, SOLUTION INTRAMUSCULAR; INTRAVENOUS; SUBCUTANEOUS PRN
Status: CANCELLED | OUTPATIENT
Start: 2024-08-21

## 2024-08-21 RX ORDER — PROCHLORPERAZINE EDISYLATE 5 MG/ML
5 INJECTION INTRAMUSCULAR; INTRAVENOUS
Status: CANCELLED | OUTPATIENT
Start: 2024-08-21 | End: 2024-08-22

## 2024-08-21 RX ORDER — SODIUM CHLORIDE 9 MG/ML
INJECTION, SOLUTION INTRAVENOUS PRN
Status: CANCELLED | OUTPATIENT
Start: 2024-08-21

## 2024-08-21 RX ORDER — ACETAMINOPHEN 500 MG
1000 TABLET ORAL ONCE
Status: CANCELLED | OUTPATIENT
Start: 2024-08-21 | End: 2024-08-21

## 2024-08-21 RX ORDER — SODIUM CHLORIDE 0.9 % (FLUSH) 0.9 %
5-40 SYRINGE (ML) INJECTION EVERY 12 HOURS SCHEDULED
Status: CANCELLED | OUTPATIENT
Start: 2024-08-21

## 2024-08-21 RX ORDER — SODIUM CHLORIDE 0.9 % (FLUSH) 0.9 %
5-40 SYRINGE (ML) INJECTION PRN
Status: DISCONTINUED | OUTPATIENT
Start: 2024-08-21 | End: 2024-08-21 | Stop reason: HOSPADM

## 2024-08-21 RX ORDER — LIDOCAINE HYDROCHLORIDE 10 MG/ML
INJECTION, SOLUTION INFILTRATION; PERINEURAL PRN
Status: DISCONTINUED | OUTPATIENT
Start: 2024-08-21 | End: 2024-08-21 | Stop reason: HOSPADM

## 2024-08-21 RX ORDER — ONDANSETRON 2 MG/ML
INJECTION INTRAMUSCULAR; INTRAVENOUS PRN
Status: DISCONTINUED | OUTPATIENT
Start: 2024-08-21 | End: 2024-08-21 | Stop reason: SDUPTHER

## 2024-08-21 RX ORDER — SODIUM CHLORIDE 9 MG/ML
INJECTION, SOLUTION INTRAVENOUS PRN
Status: DISCONTINUED | OUTPATIENT
Start: 2024-08-21 | End: 2024-08-21 | Stop reason: HOSPADM

## 2024-08-21 RX ORDER — ACETAMINOPHEN 325 MG/1
650 TABLET ORAL
Status: CANCELLED | OUTPATIENT
Start: 2024-08-21 | End: 2024-08-22

## 2024-08-21 RX ORDER — FENTANYL CITRATE 50 UG/ML
25 INJECTION, SOLUTION INTRAMUSCULAR; INTRAVENOUS EVERY 5 MIN PRN
Status: CANCELLED | OUTPATIENT
Start: 2024-08-21

## 2024-08-21 RX ORDER — ONDANSETRON 2 MG/ML
4 INJECTION INTRAMUSCULAR; INTRAVENOUS ONCE
Status: CANCELLED | OUTPATIENT
Start: 2024-08-21 | End: 2024-08-21

## 2024-08-21 RX ORDER — MIDAZOLAM HYDROCHLORIDE 2 MG/2ML
2 INJECTION, SOLUTION INTRAMUSCULAR; INTRAVENOUS
Status: CANCELLED | OUTPATIENT
Start: 2024-08-21 | End: 2024-08-22

## 2024-08-21 RX ORDER — PROTAMINE SULFATE 10 MG/ML
INJECTION, SOLUTION INTRAVENOUS PRN
Status: DISCONTINUED | OUTPATIENT
Start: 2024-08-21 | End: 2024-08-21 | Stop reason: SDUPTHER

## 2024-08-21 RX ORDER — SODIUM CHLORIDE 0.9 % (FLUSH) 0.9 %
5-40 SYRINGE (ML) INJECTION EVERY 12 HOURS SCHEDULED
Status: DISCONTINUED | OUTPATIENT
Start: 2024-08-21 | End: 2024-08-21 | Stop reason: HOSPADM

## 2024-08-21 RX ORDER — SODIUM CHLORIDE, SODIUM LACTATE, POTASSIUM CHLORIDE, CALCIUM CHLORIDE 600; 310; 30; 20 MG/100ML; MG/100ML; MG/100ML; MG/100ML
INJECTION, SOLUTION INTRAVENOUS CONTINUOUS
Status: CANCELLED | OUTPATIENT
Start: 2024-08-21

## 2024-08-21 RX ORDER — HEPARIN SODIUM 1000 [USP'U]/ML
INJECTION, SOLUTION INTRAVENOUS; SUBCUTANEOUS PRN
Status: DISCONTINUED | OUTPATIENT
Start: 2024-08-21 | End: 2024-08-21 | Stop reason: SDUPTHER

## 2024-08-21 RX ORDER — FENTANYL CITRATE 50 UG/ML
100 INJECTION, SOLUTION INTRAMUSCULAR; INTRAVENOUS
Status: CANCELLED | OUTPATIENT
Start: 2024-08-21 | End: 2024-08-22

## 2024-08-21 RX ORDER — OXYCODONE HYDROCHLORIDE 5 MG/1
5 TABLET ORAL
Status: CANCELLED | OUTPATIENT
Start: 2024-08-21 | End: 2024-08-22

## 2024-08-21 RX ORDER — DEXAMETHASONE SODIUM PHOSPHATE 4 MG/ML
4 INJECTION, SOLUTION INTRA-ARTICULAR; INTRALESIONAL; INTRAMUSCULAR; INTRAVENOUS; SOFT TISSUE
Status: CANCELLED | OUTPATIENT
Start: 2024-08-21 | End: 2024-08-22

## 2024-08-21 RX ADMIN — LIDOCAINE HYDROCHLORIDE 60 MG: 20 INJECTION, SOLUTION EPIDURAL; INFILTRATION; INTRACAUDAL; PERINEURAL at 14:23

## 2024-08-21 RX ADMIN — FENTANYL CITRATE 25 MCG: 50 INJECTION, SOLUTION INTRAMUSCULAR; INTRAVENOUS at 14:23

## 2024-08-21 RX ADMIN — SODIUM CHLORIDE 7.5 MG/HR: 900 INJECTION, SOLUTION INTRAVENOUS at 03:51

## 2024-08-21 RX ADMIN — MIDAZOLAM HYDROCHLORIDE 2 MG: 1 INJECTION, SOLUTION INTRAMUSCULAR; INTRAVENOUS at 14:19

## 2024-08-21 RX ADMIN — PROTAMINE SULFATE 100 MG: 10 INJECTION, SOLUTION INTRAVENOUS at 15:16

## 2024-08-21 RX ADMIN — PROPOFOL 40 MG: 10 INJECTION, EMULSION INTRAVENOUS at 15:03

## 2024-08-21 RX ADMIN — SODIUM CHLORIDE, POTASSIUM CHLORIDE, SODIUM LACTATE AND CALCIUM CHLORIDE: 600; 310; 30; 20 INJECTION, SOLUTION INTRAVENOUS at 14:17

## 2024-08-21 RX ADMIN — SODIUM CHLORIDE, PRESERVATIVE FREE 10 ML: 5 INJECTION INTRAVENOUS at 10:25

## 2024-08-21 RX ADMIN — HEPARIN SODIUM 15000 UNITS: 1000 INJECTION, SOLUTION INTRAVENOUS; SUBCUTANEOUS at 14:55

## 2024-08-21 RX ADMIN — LIDOCAINE HYDROCHLORIDE 40 MG: 20 INJECTION, SOLUTION EPIDURAL; INFILTRATION; INTRACAUDAL; PERINEURAL at 15:03

## 2024-08-21 RX ADMIN — ONDANSETRON HYDROCHLORIDE 4 MG: 2 INJECTION, SOLUTION INTRAMUSCULAR; INTRAVENOUS at 14:29

## 2024-08-21 RX ADMIN — FENTANYL CITRATE 50 MCG: 50 INJECTION, SOLUTION INTRAMUSCULAR; INTRAVENOUS at 15:03

## 2024-08-21 RX ADMIN — HEPARIN SODIUM 3000 UNITS: 1000 INJECTION, SOLUTION INTRAVENOUS; SUBCUTANEOUS at 15:06

## 2024-08-21 RX ADMIN — PROPOFOL 100 MCG/KG/MIN: 10 INJECTION, EMULSION INTRAVENOUS at 14:23

## 2024-08-21 RX ADMIN — FENTANYL CITRATE 25 MCG: 50 INJECTION, SOLUTION INTRAMUSCULAR; INTRAVENOUS at 14:37

## 2024-08-21 RX ADMIN — PHENYLEPHRINE HYDROCHLORIDE 40 MCG/MIN: 10 INJECTION INTRAVENOUS at 14:31

## 2024-08-21 NOTE — CONSULTS
[facility]  [address] [place] [address]  [phone]  [email or web address]     PLEASE NOTE THAT WE CONFIRMED WITH THE REFERRING PHYSICIAN PRIOR TO SEEING THE PATIENT THAT THE PATIENT IS BEING REFERRED FOR INITIAL HOSPITAL EVALUATION AND FOR LONG-TERM ONGOING CARDIAC CARE.      Date of  Admission: 8/20/2024  3:01 PM     Admission type:Emergency    Max Alcaraz is a 68 y.o. male admitted for Atrial flutter (HCC) [I48.92]  Atrial flutter with rapid ventricular response (HCC) [I48.92]. Pt with AFL with rvr, sp hybrid AF abl/VALENTINO ligation.       Patient Active Problem List    Diagnosis Date Noted    Atrial flutter with rapid ventricular response (HCC) 08/20/2024    S/P left atrial appendage ligation 02/22/2023    S/P ablation operation for arrhythmia 02/22/2023    Systolic CHF, chronic (HCC) 08/30/2019    Mixed hyperlipidemia 08/30/2019    Nonischemic cardiomyopathy (HCC) 08/30/2019    Atrial fibrillation (HCC) 06/03/2019      Ethan Lopez III, DO  Past Medical History:   Diagnosis Date    Arthritis     Atrial fibrillation (HCC)     Cancer (HCC)     sigmoid colon    Congestive heart failure (HCC)     Diabetes (HCC)     prediabetic    Hypercholesterolemia     Hypertension       Past Surgical History:   Procedure Laterality Date    APPENDECTOMY  2011    CATARACT REMOVAL Bilateral     HERNIA REPAIR      TOTAL COLECTOMY  09/2013    cancer     No Known Allergies  Social History     Tobacco Use    Smoking status: Never    Smokeless tobacco: Never   Substance Use Topics    Alcohol use: Not Currently    Drug use: Never     Family History   Problem Relation Age of Onset    Cancer Sister     Cancer Brother     Emphysema Brother     Cancer Mother     Coronary Art Dis Father       Current Facility-Administered Medications   Medication Dose Route Frequency    dilTIAZem 100 mg in sodium chloride 0.9 % 100 mL infusion (ADD[facility])  2.5-15 mg/hr IntraVENous Continuous 
rhythm.   Pulmonary:      Effort: Pulmonary effort is normal.      Breath sounds: No wheezing.   Skin:     General: Skin is warm.   Neurological:      Mental Status: He is alert.          Data Review:   No results for input(s): \"WBC\", \"HGB\", \"HCT\", \"PLT\" in the last 72 hours.  No results for input(s): \"NA\", \"K\", \"CL\", \"CO2\", \"GLU\", \"BUN\", \"CREATININE\", \"MG\", \"PHOS\", \"ALT\", \"INR\" in the last 72 hours.    Invalid input(s): \"CA\", \"ALB\", \"TBIL\", \"TBILI\", \"SGOT\"    No results for input(s): \"TROPHS\", \"BNPNT\" in the last 72 hours.    No intake or output data in the 24 hours ending 08/20/24 1621     Cardiographics    Telemetry: Atrial flutter  ECG: Atrial flutter    Echocardiogram:  02/15/23    MEAGAN ANESTHESIA INTRA OP 02/22/2023  8:39 AM (Final)    Narrative  See Anesthesia Procedure note for procedure details.    Signed by: Unknown Provider Result on 2/22/2023  8:39 AM      Stress Test:  02/15/23    MEAGAN ANESTHESIA INTRA OP 02/22/2023  8:39 AM (Final)    Narrative  See Anesthesia Procedure note for procedure details.    Signed by: Unknown Provider Result on 2/22/2023  8:39 AM       CT of the chest with contrast is normal examination       Assessment:   68-year-old gentleman with symptomatic atrial flutter    Atrial flutter with 2-1 atrioventricular conduction    Recommend to continue intravenous diltiazem for rate control overnight as long as mean arterial pressure more than 60 mmHg.  Target heart rate less than 110 bpm.  Electrolytes pending.  Check for dehydration with orthostatic vital sign check.    N.p.o. after midnight    Target serum potassium level more than 4 mmol/L, target serum magnesium level more than 2 mg/dL    Thank you for allowing us to participate in the care of this patient.  We will follow.      Izabella Hull MD  CC:Ethan Lopez III,

## 2024-08-21 NOTE — PLAN OF CARE
Problem: Discharge Planning  Goal: Discharge to home or other facility with appropriate resources  Outcome: Adequate for Discharge     Problem: Safety - Adult  Goal: Free from fall injury  8/21/2024 1936 by Joelle Galan RN  Outcome: Adequate for Discharge  8/21/2024 0800 by Joelle Galan, RN  Outcome: Progressing     Problem: Chronic Conditions and Co-morbidities  Goal: Patient's chronic conditions and co-morbidity symptoms are monitored and maintained or improved  Outcome: Adequate for Discharge     
Predictive Model Details          24 (Normal)  Factor Value    Calculated 8/21/2024 08:00 48% Age 68 years old    Deterioration Index Model 17% Cardiac rhythm A flutter     17% Systolic 98     12% Pulse 103     3% Respiratory rate 17     2% Pulse oximetry 94 %     1% WBC count 7.8 K/uL     0% Sodium 141 mmol/L     0% Potassium 3.9 mmol/L     0% Temperature 98.1 °F (36.7 °C)     0% Hematocrit 39.4 %          Problem: Discharge Planning  Goal: Discharge to home or other facility with appropriate resources  8/21/2024 0441 by Kiley Feliciano RN  Outcome: Progressing  Flowsheets (Taken 8/20/2024 1930)  Discharge to home or other facility with appropriate resources: Identify barriers to discharge with patient and caregiver     Problem: Safety - Adult  Goal: Free from fall injury  8/21/2024 0800 by Joelle Galan RN  Outcome: Progressing  8/21/2024 0441 by Kiley Feliciano RN  Outcome: Progressing  Flowsheets (Taken 8/20/2024 1900)  Free From Fall Injury: Instruct family/caregiver on patient safety     
Progressing  Flowsheets (Taken 8/20/2024 1930)  Discharge to home or other facility with appropriate resources: Identify barriers to discharge with patient and caregiver     Problem: Safety - Adult  Goal: Free from fall injury  Outcome: Progressing  Flowsheets (Taken 8/20/2024 1900)  Free From Fall Injury: Instruct family/caregiver on patient safety

## 2024-08-21 NOTE — ANESTHESIA PRE PROCEDURE
(TYLENOL) tablet 650 mg  650 mg Oral Q6H PRN Sana Cannon MD   650 mg at 08/20/24 2218    Or   • acetaminophen (TYLENOL) suppository 650 mg  650 mg Rectal Q6H PRN Sana Cannon MD           Allergies:  No Known Allergies    Problem List:    Patient Active Problem List   Diagnosis Code   • Systolic CHF, chronic (HCC) I50.22   • Mixed hyperlipidemia E78.2   • Nonischemic cardiomyopathy (HCC) I42.8   • Atrial fibrillation (HCC) I48.91   • S/P left atrial appendage ligation Z98.890   • S/P ablation operation for arrhythmia Z98.890, Z86.79   • Atrial flutter with rapid ventricular response (HCC) I48.92       Past Medical History:        Diagnosis Date   • Arthritis    • Atrial fibrillation (HCC)    • Cancer (HCC)     sigmoid colon   • Congestive heart failure (HCC)    • Diabetes (HCC)     prediabetic   • Hypercholesterolemia    • Hypertension        Past Surgical History:        Procedure Laterality Date   • APPENDECTOMY  2011   • CATARACT REMOVAL Bilateral    • HERNIA REPAIR     • TOTAL COLECTOMY  09/2013    cancer       Social History:    Social History     Tobacco Use   • Smoking status: Never   • Smokeless tobacco: Never   Substance Use Topics   • Alcohol use: Not Currently                                Counseling given: Not Answered      Vital Signs (Current):   Vitals:    08/21/24 0100 08/21/24 0200 08/21/24 0300 08/21/24 0400   BP: 116/81 97/81 97/73 91/70   Pulse: 98 74 74 73   Resp: 16 16 16 16   Temp:    98.3 °F (36.8 °C)   TempSrc:    Oral   SpO2: 95% 94% 94% 95%   Weight:       Height:                                                  BP Readings from Last 3 Encounters:   08/21/24 91/70   05/22/24 134/88   04/14/23 113/71       NPO Status:                                                                                 BMI:   Wt Readings from Last 3 Encounters:   08/20/24 83.2 kg (183 lb 6.8 oz)   05/22/24 83.6 kg (184 lb 6.4 oz)   04/14/23 83.3 kg (183 lb 9.6 oz)     Body mass index is 27.89

## 2024-08-21 NOTE — DISCHARGE SUMMARY
HCT 39.4   WBC 7.8          Discharge Medications:     Medication List        CONTINUE taking these medications      metoprolol succinate 25 MG extended release tablet  Commonly known as: TOPROL XL     rosuvastatin 20 MG tablet  Commonly known as: CRESTOR  Take 1 tablet by mouth nightly     Vitamin D 125 MCG (5000 UT) Caps  Take 5,000 Units by mouth every morning (before breakfast)                  DISPOSITION:    Home with Family: x      Home with HH/PT/OT/RN:    SNF/LTC:    TATUM:    OTHER:            Code status: Full code  Recommended diet: cardiac diet  Recommended activity: activity as tolerated        Follow up with:   PCP : Ethna Lopez III, Ethan Patterson III, DO  [address]  [facility] [address] [phone]    Go on 8/28/2024  at 9:40am for your PCP hospital follow up.    Rashaad Acuna MD  [address] [facility] [address] [phone]    Follow up      [facility]  [address]  [facility] [address] [phone]  Follow up  As needed - Pending sale to [facility] is a mobile urgent care provider that comes to your home. You may call them if you would like to set up an appt to be seen for a follow up while waiting to be seen by your PCP.          Total time in minutes spent coordinating this discharge (includes going over instructions, follow-up, prescriptions, and preparing report for sign off to her PCP) :  35 minutes

## 2024-08-21 NOTE — DISCHARGE INSTRUCTIONS
HOSPITALIST DISCHARGE INSTRUCTIONS    NAME: Max Alcaraz   :  1955   MRN:  [id number]     Date/Time:  2024 3:48 PM    ADMIT DATE: 2024     DISCHARGE DATE: 2024     DISCHARGE DIAGNOSIS:  A flutter with RVR POA- 2:1 conduction-S/p Ablation by Dr Acuna today- now in NSR- cleared for DC after 7 pm-- OP followup in 1-2 weeks recomended  History of ablation with atrial appendage ligation-not currently on any anticoagulation since  Full code  Consultations:  None    MEDICATIONS:  As per medication reconciliation  list  It is important that you take the medication exactly as they are prescribed.   Keep your medication in the bottles provided by the pharmacist and keep a list of the medication names, dosages, and times to be taken in your wallet.   Do not take other medications without consulting your doctor.     Pain Management: per above medications    What to do at Home    Recommended diet:  cardiac diet    Recommended activity: activity as tolerated    If you have questions regarding the hospital related prescriptions or hospital related issues please call at .    If you experience any of the following symptoms then please call your primary care physician or return to the emergency room if you cannot get hold of your doctor:  Fever, chills, nausea, vomiting, diarrhea, change in mentation, falling, bleeding, shortness of breath,     Follow Up:  Dr Acuna in 1-2 weeks in office as below    Information obtained by :  I understand that if any problems occur once I am at home I am to contact my physician.    I understand and acknowledge receipt of the instructions indicated above.                                                                                                                                           Physician's or R.N.'s Signature                                                                  Date/Time

## 2024-08-22 LAB — ECHO BSA: 2 M2

## 2024-08-22 NOTE — ANESTHESIA POSTPROCEDURE EVALUATION
Department of Anesthesiology  Postprocedure Note    Patient: Max Alcaraz  MRN: [id number]  YOB: 1955  Date of evaluation: 8/22/2024    Procedure Summary       Date: 08/21/24 Room / Location: hospitals EP LAB / hospitals CARDIAC CATH LAB    Anesthesia Start: 1419 Anesthesia Stop: 1534    Procedures:       Ablation A-fib w complete ep study      Ablation following A-fib addl Diagnosis: Atypical atrial flutter (HCC)    Providers: Rashaad Acuna MD Responsible Provider: Alvino Moctezuma MD    Anesthesia Type: MAC ASA Status: 3            Anesthesia Type: MAC    Ella Phase I: Ella Score: 10    Ella Phase II:      Anesthesia Post Evaluation    Patient location during evaluation: PACU  Patient participation: complete - patient participated  Level of consciousness: sleepy but conscious and responsive to verbal stimuli  Pain score: 2  Airway patency: patent  Nausea & Vomiting: no vomiting and no nausea  Cardiovascular status: blood pressure returned to baseline and hemodynamically stable  Respiratory status: acceptable  Hydration status: stable  Multimodal analgesia pain management approach  Pain management: adequate    There were no known notable events for this encounter.

## 2024-08-28 ENCOUNTER — OFFICE VISIT (OUTPATIENT)
Age: 69
End: 2024-08-28

## 2024-08-28 VITALS
BODY MASS INDEX: 27.46 KG/M2 | HEIGHT: 68 IN | RESPIRATION RATE: 14 BRPM | DIASTOLIC BLOOD PRESSURE: 76 MMHG | OXYGEN SATURATION: 98 % | SYSTOLIC BLOOD PRESSURE: 127 MMHG | HEART RATE: 59 BPM | TEMPERATURE: 97.1 F | WEIGHT: 181.2 LBS

## 2024-08-28 DIAGNOSIS — N18.31 STAGE 3A CHRONIC KIDNEY DISEASE (HCC): ICD-10-CM

## 2024-08-28 DIAGNOSIS — I48.92 ATRIAL FLUTTER WITH RAPID VENTRICULAR RESPONSE (HCC): Primary | ICD-10-CM

## 2024-08-28 DIAGNOSIS — I50.22 CHRONIC SYSTOLIC (CONGESTIVE) HEART FAILURE (HCC): ICD-10-CM

## 2024-08-28 DIAGNOSIS — E78.2 MIXED HYPERLIPIDEMIA: ICD-10-CM

## 2024-08-28 NOTE — PROGRESS NOTES
\"Have you been to the ER, urgent care clinic since your last visit?  Hospitalized since your last visit?\"    8/20/24-[facility] ed    “Have you seen or consulted any other health care providers outside of [facility] since your last visit?”    NO            Click Here for Release of Records Request

## 2024-08-28 NOTE — PROGRESS NOTES
Coronary Art Dis Father        Social History     Socioeconomic History    Marital status:      Spouse name: Not on file    Number of children: Not on file    Years of education: Not on file    Highest education level: Not on file   Occupational History    Not on file   Tobacco Use    Smoking status: Never    Smokeless tobacco: Never   Substance and Sexual Activity    Alcohol use: Not Currently    Drug use: Never    Sexual activity: Yes     Partners: Female   Other Topics Concern    Not on file   Social History Narrative    Not on file     Social Determinants of Health     Financial Resource Strain: Low Risk  (5/22/2024)    Overall Financial Resource Strain (CARDIA)     Difficulty of Paying Living Expenses: Not hard at all   Food Insecurity: No Food Insecurity (8/21/2024)    Hunger Vital Sign     Worried About Running Out of Food in the Last Year: Never true     Ran Out of Food in the Last Year: Never true   Transportation Needs: No Transportation Needs (8/21/2024)    PRAPARE - Transportation     Lack of Transportation (Medical): No     Lack of Transportation (Non-Medical): No   Physical Activity: Insufficiently Active (5/22/2024)    Exercise Vital Sign     Days of Exercise per Week: 3 days     Minutes of Exercise per Session: 40 min   Stress: Not on file   Social Connections: Not on file   Intimate Partner Violence: Not on file   Housing Stability: Low Risk  (8/21/2024)    Housing Stability Vital Sign     Unable to Pay for Housing in the Last Year: No     Number of Times Moved in the Last Year: 0     Homeless in the Last Year: No          /76 (Site: Left Upper Arm, Position: Sitting, Cuff Size: Medium Adult)   Pulse 59   Temp 97.1 °F (36.2 °C) (Temporal)   Resp 14   Ht 1.727 m (5' 8\")   Wt 82.2 kg (181 lb 3.2 oz)   SpO2 98%   BMI 27.55 kg/m²     Physical Examination:   General - Well appearing male  HEENT - PERRL, TM no erythema/opacification, normal nasal turbinates, no oropharyngeal  erythema or exudate, MMM  Neck - supple, no bruits, no thyroidomegaly, no lymphadenopathy  Pulm - clear to auscultation bilaterally  Cardio - RRR, normal S1 S2, no murmur  Abd - soft, nontender, no masses, no HSM  Extrem - no edema, +2 distal pulses  Neuro-  No focal deficits, CN intact     Assessment/Plan:     Atrial flutter--sp ablation, aug 21.  Remains nsr, on toprol xl now.  Has cardio follow up next month  Hyperlipids--on crestor  Ckd 3a--has been stable  Chronic systolic chf--well compensated, on toprol xl.      Rtc may for bushra Lopez, DO

## 2024-08-30 RX ORDER — ROSUVASTATIN CALCIUM 20 MG/1
TABLET, COATED ORAL NIGHTLY
Qty: 90 TABLET | Refills: 3 | Status: SHIPPED | OUTPATIENT
Start: 2024-08-30

## 2024-08-30 NOTE — PROGRESS NOTES
1105 - PCP hospital follow-up transitional care appointment has been scheduled with Dr. Ethan Lopez on 8/28/24 2619. [facility] placed [facility] information AVS for patient resource.   Pending patient discharge.  Apolonia Cardenas Care Management Assistant     Attempted to schedule PCP hospital follow up. Sent PCP office a message, awaiting return call from PCP office with appt information. [facility] placed [facility] information AVS for patient resource.  Pending patient discharge. Apolonia Cardenas Care Management Assistant  
1600  Pt arrived to unit. Bilateral groin sites CDI, soft to palpation. Temp 97.6. HR 66 bpm. BP 96/56. A&Ox4. Pt denies pain at this time.   
1800  Pt arrived to unit. Temp 98.6.  bpm due to ambulation. A flutter. /77. RR 20. SpO2 97% on RA. Pt denies pain at this time. A&Ox4.     4 Eyes Skin Assessment     NAME:  Max Alcaraz  YOB: 1955  MEDICAL RECORD NUMBER:  [id number]    The patient is being assessed for  Admission    I agree that at least one RN has performed a thorough Head to Toe Skin Assessment on the patient. ALL assessment sites listed below have been assessed.      Areas assessed by both nurses:    Head, Face, Ears        Does the Patient have a Wound? No noted wound(s)       Juan Prevention initiated by RN: Yes  Wound Care Orders initiated by RN: No    Pressure Injury (Stage 3,4, Unstageable, DTI, NWPT, and Complex wounds) if present, place Wound referral order by RN under : No    New Ostomies, if present place, Ostomy referral order under : No     Nurse 1 eSignature: Electronically signed by Joelle Galan RN on 8/20/24 at 6:39 PM EDT    **SHARE this note so that the co-signing nurse can place an eSignature**    Nurse 2 eSignature: Electronically signed by Ayanna Sanders RN on 8/20/24 at 6:39 PM EDT      1815  Pt sitting in recliner.  bpm.  
Medication:   losartan (COZAAR) 50 MG tablet 30 tablet 0 8/4/2024    Last BP was equal to or less than 150/100   Last office visit date: 04/05/2024  Medication Refill Protocol Failed.  Failed criteria: se above. Sent to clinician to review.   
Pt to dress self for discharge. IV's removed without complications, cath tips intact. Bilateral groin sites CDI, soft to palpation. Figure 8 stitch removed. Reviewed discharge instructions with patient including site care, follow up appointments, and medication education. Answered all questions. Pt to be wheeled down for discharge.     
Sent from clinic in AFL with 2:1 conduction    Hx of hybrid AF abl with Dr Lagunas and VALENTINO ligation. No need for oac. Will keep npo p mn for LA ablation. Rate control overnight with dilt gtt (as bp allows)  Thank you for allowing me to participate in this patients care.    Rashaad Acuna MD, FACC, [facility]    
Sp LAF/AFL ablation    In sinus. Ok for dc post 715pm. F/u in 1-2 weeks as outpt    Thank you for allowing me to participate in this patients care.    Rashaad Acuna MD, FACC, [facility]    
results and cultures  YES  Reviewed most current radiology test results   YES  Review and summation of old records today    NO  Reviewed patient's current orders and MAR    YES  PMH/SH reviewed - no change compared to H&P    Procedures: see electronic medical records for all procedures/Xrays and details which were not copied into this note but were reviewed prior to creation of Plan.      LABS:  I reviewed today's most current labs and imaging studies.  Pertinent labs include:  Recent Labs     08/20/24  1542   WBC 7.8   HGB 13.0   HCT 39.4        Recent Labs     08/20/24  1542 08/21/24  0353    141   K 4.4 3.9   * 112*   CO2 25 24   GLUCOSE 106* 91   BUN 22* 18   CREATININE 1.33* 1.26   CALCIUM 8.8 8.9   MG 2.3 2.3   PHOS  --  3.6   BILITOT 0.8  --    AST 16  --    ALT 23  --        Signed: Sana Cannon MD

## 2025-06-20 ENCOUNTER — OFFICE VISIT (OUTPATIENT)
Age: 70
End: 2025-06-20
Payer: MEDICARE

## 2025-06-20 VITALS
OXYGEN SATURATION: 98 % | WEIGHT: 178.6 LBS | HEIGHT: 68 IN | BODY MASS INDEX: 27.07 KG/M2 | TEMPERATURE: 97.3 F | SYSTOLIC BLOOD PRESSURE: 132 MMHG | HEART RATE: 63 BPM | RESPIRATION RATE: 14 BRPM | DIASTOLIC BLOOD PRESSURE: 84 MMHG

## 2025-06-20 DIAGNOSIS — Z12.11 SCREEN FOR COLON CANCER: ICD-10-CM

## 2025-06-20 DIAGNOSIS — N18.31 STAGE 3A CHRONIC KIDNEY DISEASE (HCC): ICD-10-CM

## 2025-06-20 DIAGNOSIS — R73.03 PREDIABETES: ICD-10-CM

## 2025-06-20 DIAGNOSIS — E78.2 MIXED HYPERLIPIDEMIA: ICD-10-CM

## 2025-06-20 DIAGNOSIS — Z12.5 PROSTATE CANCER SCREENING: ICD-10-CM

## 2025-06-20 DIAGNOSIS — I50.22 CHRONIC SYSTOLIC (CONGESTIVE) HEART FAILURE (HCC): ICD-10-CM

## 2025-06-20 DIAGNOSIS — Z00.00 MEDICARE ANNUAL WELLNESS VISIT, SUBSEQUENT: Primary | ICD-10-CM

## 2025-06-20 DIAGNOSIS — G25.0 BENIGN ESSENTIAL TREMOR: ICD-10-CM

## 2025-06-20 PROBLEM — I48.92 ATRIAL FLUTTER WITH RAPID VENTRICULAR RESPONSE (HCC): Status: RESOLVED | Noted: 2024-08-20 | Resolved: 2025-06-20

## 2025-06-20 PROBLEM — I48.91 ATRIAL FIBRILLATION (HCC): Status: RESOLVED | Noted: 2019-06-03 | Resolved: 2025-06-20

## 2025-06-20 PROCEDURE — 99213 OFFICE O/P EST LOW 20 MIN: CPT | Performed by: INTERNAL MEDICINE

## 2025-06-20 SDOH — HEALTH STABILITY: PHYSICAL HEALTH: ON AVERAGE, HOW MANY MINUTES DO YOU ENGAGE IN EXERCISE AT THIS LEVEL?: 30 MIN

## 2025-06-20 SDOH — HEALTH STABILITY: PHYSICAL HEALTH: ON AVERAGE, HOW MANY DAYS PER WEEK DO YOU ENGAGE IN MODERATE TO STRENUOUS EXERCISE (LIKE A BRISK WALK)?: 3 DAYS

## 2025-06-20 ASSESSMENT — LIFESTYLE VARIABLES
HOW MANY STANDARD DRINKS CONTAINING ALCOHOL DO YOU HAVE ON A TYPICAL DAY: 1 OR 2
HOW MANY STANDARD DRINKS CONTAINING ALCOHOL DO YOU HAVE ON A TYPICAL DAY: 1
HOW OFTEN DO YOU HAVE SIX OR MORE DRINKS ON ONE OCCASION: 1
HOW OFTEN DO YOU HAVE A DRINK CONTAINING ALCOHOL: MONTHLY OR LESS
HOW OFTEN DO YOU HAVE A DRINK CONTAINING ALCOHOL: 2

## 2025-06-20 ASSESSMENT — PATIENT HEALTH QUESTIONNAIRE - PHQ9
2. FEELING DOWN, DEPRESSED OR HOPELESS: NOT AT ALL
SUM OF ALL RESPONSES TO PHQ QUESTIONS 1-9: 0
SUM OF ALL RESPONSES TO PHQ QUESTIONS 1-9: 0
1. LITTLE INTEREST OR PLEASURE IN DOING THINGS: NOT AT ALL
SUM OF ALL RESPONSES TO PHQ QUESTIONS 1-9: 0
SUM OF ALL RESPONSES TO PHQ QUESTIONS 1-9: 0

## 2025-06-20 NOTE — PATIENT INSTRUCTIONS
Once I review your lab results, will then send in a new medicine to help with your tremor.         Hearing Loss: Care Instructions  Overview     Hearing loss is a sudden or slow decrease in how well you hear. It can range from slight to profound. Permanent hearing loss can occur with aging. It also can happen when you are exposed long-term to loud noise. Examples include listening to loud music, riding motorcycles, or being around other loud machines.  Hearing loss can affect your work and home life. It can make you feel lonely or depressed. You may feel that you have lost your independence. But hearing aids and other devices can help you hear better and feel connected to others.  Follow-up care is a key part of your treatment and safety. Be sure to make and go to all appointments, and call your doctor if you are having problems. It's also a good idea to know your test results and keep a list of the medicines you take.  How can you care for yourself at home?  Avoid loud noises whenever possible. This helps keep your hearing from getting worse.  Always wear hearing protection around loud noises.  Wear a hearing aid as directed.  A professional can help you pick a hearing aid that will work best for you.  You can also get hearing aids over the counter for mild to moderate hearing loss.  Have hearing tests as your doctor suggests. They can show whether your hearing has changed. Your hearing aid may need to be adjusted.  Use other devices as needed. These may include:  Telephone amplifiers and hearing aids that can connect to a television, stereo, radio, or microphone.  Devices that use lights or vibrations. These alert you to the doorbell, a ringing telephone, or a baby monitor.  Television closed-captioning. This shows the words at the bottom of the screen. Most new TVs can do this.  TTY (text telephone). This lets you type messages back and forth on the telephone instead of talking or listening. These devices are

## 2025-06-20 NOTE — PROGRESS NOTES
Medicare Annual Wellness Visit    Max Alcaraz is here for Medicare AWV    Assessment & Plan   Medicare annual wellness visit, subsequent     No follow-ups on file.     Subjective       Patient's complete Health Risk Assessment and screening values have been reviewed and are found in Flowsheets. The following problems were reviewed today and where indicated follow up appointments were made and/or referrals ordered.    Positive Risk Factor Screenings with Interventions:                  Hearing Screen:  Do you or your family notice any trouble with your hearing that hasn't been managed with hearing aids?: (!) (Proxy-Rptd) Yes    Interventions:  Patient declines any further evaluation or treatment    Vision Screen:  Do you have difficulty driving, watching TV, or doing any of your daily activities because of your eyesight?: (!) (Proxy-Rptd) Yes  Have you had an eye exam within the past year?: (Proxy-Rptd) Appointment is scheduled  Interventions:   Patient encouraged to make appointment with their eye specialist    Safety:  Do you have non-slip mats or non-slip surfaces or shower bars or grab bars in your shower or bathtub?: (!) (Proxy-Rptd) No  Interventions:  Patient declined any further interventions or treatment     Advanced Directives:  Do you have a Living Will?: (!) (Proxy-Rptd) No    Intervention:  has NO advanced directive - information provided                     Objective   Vitals:    06/20/25 1139 06/20/25 1143   BP: (!) 148/91 132/84   BP Site: Left Upper Arm Right Upper Arm   Patient Position: Sitting Sitting   BP Cuff Size: Large Adult Large Adult   Pulse: 63    Resp: 14    Temp: 97.3 °F (36.3 °C)    TempSrc: Temporal    SpO2: 98%    Weight: 81 kg (178 lb 9.6 oz)    Height: 1.727 m (5' 8\")       Body mass index is 27.16 kg/m².                  No Known Allergies  Prior to Visit Medications    Medication Sig Taking? Authorizing Provider   rosuvastatin (CRESTOR) 20 MG tablet Take 1 tablet by mouth

## 2025-06-21 LAB
ALBUMIN SERPL-MCNC: 4.6 G/DL (ref 3.9–4.9)
ALP SERPL-CCNC: 84 IU/L (ref 44–121)
ALT SERPL-CCNC: 14 IU/L (ref 0–44)
AST SERPL-CCNC: 21 IU/L (ref 0–40)
BASOPHILS # BLD AUTO: 0.1 X10E3/UL (ref 0–0.2)
BASOPHILS NFR BLD AUTO: 1 %
BILIRUB SERPL-MCNC: 0.7 MG/DL (ref 0–1.2)
BUN SERPL-MCNC: 14 MG/DL (ref 8–27)
BUN/CREAT SERPL: 12 (ref 10–24)
CALCIUM SERPL-MCNC: 9.8 MG/DL (ref 8.6–10.2)
CHLORIDE SERPL-SCNC: 103 MMOL/L (ref 96–106)
CHOLEST SERPL-MCNC: 220 MG/DL (ref 100–199)
CO2 SERPL-SCNC: 24 MMOL/L (ref 20–29)
CREAT SERPL-MCNC: 1.15 MG/DL (ref 0.76–1.27)
EGFRCR SERPLBLD CKD-EPI 2021: 69 ML/MIN/1.73
EOSINOPHIL # BLD AUTO: 0.1 X10E3/UL (ref 0–0.4)
EOSINOPHIL NFR BLD AUTO: 2 %
ERYTHROCYTE [DISTWIDTH] IN BLOOD BY AUTOMATED COUNT: 12.1 % (ref 11.6–15.4)
GLOBULIN SER CALC-MCNC: 2.7 G/DL (ref 1.5–4.5)
GLUCOSE SERPL-MCNC: 98 MG/DL (ref 70–99)
HCT VFR BLD AUTO: 44.8 % (ref 37.5–51)
HDLC SERPL-MCNC: 50 MG/DL
HGB BLD-MCNC: 14.7 G/DL (ref 13–17.7)
IMM GRANULOCYTES # BLD AUTO: 0 X10E3/UL (ref 0–0.1)
IMM GRANULOCYTES NFR BLD AUTO: 0 %
LDLC SERPL CALC-MCNC: 136 MG/DL (ref 0–99)
LYMPHOCYTES # BLD AUTO: 2.5 X10E3/UL (ref 0.7–3.1)
LYMPHOCYTES NFR BLD AUTO: 35 %
MCH RBC QN AUTO: 30.7 PG (ref 26.6–33)
MCHC RBC AUTO-ENTMCNC: 32.8 G/DL (ref 31.5–35.7)
MCV RBC AUTO: 94 FL (ref 79–97)
MONOCYTES # BLD AUTO: 0.5 X10E3/UL (ref 0.1–0.9)
MONOCYTES NFR BLD AUTO: 7 %
NEUTROPHILS # BLD AUTO: 3.9 X10E3/UL (ref 1.4–7)
NEUTROPHILS NFR BLD AUTO: 55 %
PLATELET # BLD AUTO: 180 X10E3/UL (ref 150–450)
POTASSIUM SERPL-SCNC: 4.9 MMOL/L (ref 3.5–5.2)
PROT SERPL-MCNC: 7.3 G/DL (ref 6–8.5)
PSA SERPL-MCNC: 1.5 NG/ML (ref 0–4)
RBC # BLD AUTO: 4.79 X10E6/UL (ref 4.14–5.8)
SODIUM SERPL-SCNC: 140 MMOL/L (ref 134–144)
TRIGL SERPL-MCNC: 189 MG/DL (ref 0–149)
TSH SERPL DL<=0.005 MIU/L-ACNC: 2.35 UIU/ML (ref 0.45–4.5)
VLDLC SERPL CALC-MCNC: 34 MG/DL (ref 5–40)
WBC # BLD AUTO: 7.2 X10E3/UL (ref 3.4–10.8)

## 2025-06-26 LAB
EST. AVERAGE GLUCOSE BLD GHB EST-MCNC: 114 MG/DL
HBA1C MFR BLD: 5.6 %HB

## 2025-06-28 ENCOUNTER — RESULTS FOLLOW-UP (OUTPATIENT)
Age: 70
End: 2025-06-28

## 2025-06-28 RX ORDER — PRIMIDONE 50 MG/1
50 TABLET ORAL
Qty: 30 TABLET | Refills: 5 | Status: SHIPPED | OUTPATIENT
Start: 2025-06-28

## 2025-06-30 NOTE — RESULT ENCOUNTER NOTE
Called, Spoke with Pt  Received two pt identifiers    Patient currently at another doctors' appt. Requested call back to go over lab results.

## 2025-08-20 RX ORDER — ROSUVASTATIN CALCIUM 20 MG/1
TABLET, COATED ORAL NIGHTLY
Qty: 90 TABLET | Refills: 3 | Status: SHIPPED | OUTPATIENT
Start: 2025-08-20

## (undated) DEVICE — TTL1LYR 16FR10ML 100%SIL TMPST TR: Brand: MEDLINE

## (undated) DEVICE — PINNACLE INTRODUCER SHEATH: Brand: PINNACLE

## (undated) DEVICE — GOWN,SIRUS,NONRNF,SETINSLV,XL,20/CS: Brand: MEDLINE

## (undated) DEVICE — SOLUTION IRRIG 1000ML STRL H2O USP PLAS POUR BTL

## (undated) DEVICE — CATHETER REPROC SNDSTR ECO 3D DGNSTC ULTRSND USE SMNS IMGNG SSTM 10

## (undated) DEVICE — BLANKET WRM W25XL64IN NONWOVEN SFT LTWT PLIABLE HYPR

## (undated) DEVICE — CATHETER CONNECTION CABLE: Brand: FARASTAR™

## (undated) DEVICE — 72" ARTERIAL PRESSURE TUBING: Brand: ICU MEDICAL

## (undated) DEVICE — CABLE RMFG RSPONS ELEC EXT RED --

## (undated) DEVICE — PROBE ES TEMP HOT AND CLD FAST ACCURATE SFT FLX CIRCA S CATH

## (undated) DEVICE — Device

## (undated) DEVICE — CATHETER MAP D CRV 3-3-3-3-3 MM SPC GALAXY OCTARAY

## (undated) DEVICE — GUIDEWIRE VASC L180CM 0035IN 15MM J PTFE ROSEN TIP CRV FIX

## (undated) DEVICE — CABLE EP L150CM BLK HEXAPOLAR OCTAPOLAR DECAPOLAR EXTN CONN

## (undated) DEVICE — PATCH CARTO 3 EXT REF --

## (undated) DEVICE — DRAIN WOUND RES BULB 100ML --

## (undated) DEVICE — 3M™ MEDIPORE™ H SOFT CLOTH SURGICAL TAPE 2864, 4 INCH X 10 YARD (10CM X 9,14M), 12 ROLLS/CASE: Brand: 3M™ MEDIPORE™

## (undated) DEVICE — CATH BIDIR JCRV 8F 3.5X115MM -- THERMOCOOL SF

## (undated) DEVICE — CABLE CATH L10FT YEL CONN 12-12 PIN ELECTROGRAM CONDUCTION

## (undated) DEVICE — PROVE COVER: Brand: UNBRANDED

## (undated) DEVICE — ANTI-FOG SOLUTION WITH FOAM PAD: Brand: DEVON

## (undated) DEVICE — MAJOR LAPAROTOMY-MRMC: Brand: MEDLINE INDUSTRIES, INC.

## (undated) DEVICE — CABLE RMFG EXT CATH --

## (undated) DEVICE — ELECTRODE ES 36CM LAP FLAT L HK COAT DISP CLEANCOAT

## (undated) DEVICE — TROCAR: Brand: KII® OPTICAL ACCESS SYSTEM

## (undated) DEVICE — MEDI-TRACE CADENCE ADULT, DEFIBRILLATION ELECTRODE -RTS  (10 PR/PK) - PHILIPS: Brand: MEDI-TRACE CADENCE

## (undated) DEVICE — SHEATH GUID 11.5X8.5FR L71MM M CRV L22MM BIDIR STEER CARTO

## (undated) DEVICE — SYR 10ML LUER LOK 1/5ML GRAD --

## (undated) DEVICE — SUTURE VCRL SZ 0 L18IN ABSRB VLT L40MM CT 1/2 CIR J752D

## (undated) DEVICE — DRAIN SURG 19FR 0.25IN SIL RND W/ TRCR INDIC DOT RADPQ FULL

## (undated) DEVICE — DRSG BORDR MPLX HEEL 8.7X9.1IN --

## (undated) DEVICE — ELECTRODE PT RET AD L9FT HI MOIST COND ADH HYDRGEL CORDED

## (undated) DEVICE — CATHETER EP LG 2-8-2 MM 7 FRX95 CM LIVEWIRE

## (undated) DEVICE — MEDI-TRACE CADENCE ADULT, DEFIBRILLATION ELECTRODE -RTS  (10 PR/PK) - PHYSIO-CONTROL: Brand: MEDI-TRACE CADENCE

## (undated) DEVICE — DEVICE ES L3M EDGE COAT HEX LOK BLDE ELECTRD HOLSTER FORC

## (undated) DEVICE — SUTURE PERMA-HAND SZ 0 L30IN NONABSORBABLE BLK L36MM CT-1 424H

## (undated) DEVICE — TUBE SET IRR PUMP THERMALCOOL -- SMARTABLATE

## (undated) DEVICE — VISUALIZATION SYSTEM: Brand: CLEARIFY

## (undated) DEVICE — SUTURE MCRYL SZ 3-0 L27IN ABSRB UD L24MM PS-1 3/8 CIR PRIM Y936H

## (undated) DEVICE — HYPODERMIC SAFETY NEEDLE: Brand: MAGELLAN

## (undated) DEVICE — SOL INJ SOD CL 0.9% 500ML BG --

## (undated) DEVICE — 3M™ IOBAN™ 2 ANTIMICROBIAL INCISE DRAPE 6648EZ: Brand: IOBAN™ 2

## (undated) DEVICE — STERILE (15.2 TAPERED TO 7.6 X 183CM) POLYETHYLENE ACCORDION-FOLDED COVER FOR USE WITH SIEMENS ACUNAV ULTRASOUND CATHETER FAMILY CONNECTOR: Brand: SWIFTLINK TRANSDUCER COVER

## (undated) DEVICE — CABLE REPROC EP DIAG EXT SUPRM GRY

## (undated) DEVICE — TROCAR: Brand: KII FIOS FIRST ENTRY

## (undated) DEVICE — DISSECTOR ENDO 5MMX33CM SS --

## (undated) DEVICE — CABLE CATH L10FT RED PIN CONN 34-34 FOR THERMOCOOL

## (undated) DEVICE — CABLE EP L150CM RED HEXAPOLAR OCTAPOLAR DECAPOLAR EXTN CONN

## (undated) DEVICE — DRESSING HEMSTAT W12XL12IN 3 PLY QUIKCLOT CONTROL+

## (undated) DEVICE — CATHETER REPROC EP QUAD SUPREME COURNAND

## (undated) DEVICE — CABLE CATH L2.7M CONNECTS TO CARTO 3 SYS PIU FOR LASSO ECO

## (undated) DEVICE — HEART CATH-MRMC: Brand: MEDLINE INDUSTRIES, INC.

## (undated) DEVICE — PRESSURE MONITORING SET: Brand: TRUWAVE

## (undated) DEVICE — PATCH REF EXT FOR CARTO 3 SYS (EA = 6 PACKS)

## (undated) DEVICE — 1 X VERSACROSS CONNECT TRANSSEPTAL DILATOR;  1 X VERSACROSS RF WIRE (INCLUDING 1 X CONNECTOR CABLE (SINGLE USE)): Brand: VERSACROSS CONNECT ACCESS SOLUTION FOR FARADRIVE

## (undated) DEVICE — LABEL MED CARD MRMC STRL

## (undated) DEVICE — CATH ABLATN 10F 90CM F/SIEMENS -- SOUNDSTAR ECO

## (undated) DEVICE — TROCAR: Brand: KII® SLEEVE

## (undated) DEVICE — REM POLYHESIVE ADULT PATIENT RETURN ELECTRODE: Brand: VALLEYLAB

## (undated) DEVICE — SHEARS ENDOSCP L36CM DIA5MM ULTRASONIC CRV TIP W/ ADV

## (undated) DEVICE — CATHETER EP 7FR L115CM 2-6-2MM SPC 22 ELECTRD F CRV

## (undated) DEVICE — SOLUTION IV 1000ML 140MEQ/L SOD 5MEQ/L K 3MEQ/L MG 27MEQ/L

## (undated) DEVICE — Device: Brand: NRG TRANSSEPTAL NEEDLE

## (undated) DEVICE — SET ADMIN L104IN 18ML GRAV CK VLV RLER CLMP 2 SMRTSITE NDL

## (undated) DEVICE — STEERABLE SHEATH CLEAR: Brand: FARADRIVE™

## (undated) DEVICE — SPONGE GZ W4XL4IN COT 12 PLY TYP VII WVN C FLD DSGN STERILE

## (undated) DEVICE — DERMABOND SKIN ADH 0.7ML -- DERMABOND ADVANCED 12/BX

## (undated) DEVICE — PULSED FIELD ABLATION CATHETER: Brand: FARAWAVE™

## (undated) DEVICE — SUTURE PERMAHAND SZ 0 L30IN NONABSORBABLE BLK L26MM SH 1/2 K834H

## (undated) DEVICE — SUTURE SZ 0 27IN 5/8 CIR UR-6  TAPER PT VIOLET ABSRB VICRYL J603H

## (undated) DEVICE — CANISTER, RIGID, 3000CC: Brand: MEDLINE INDUSTRIES, INC.

## (undated) DEVICE — Device: Brand: RFP-100A CONNECTOR CABLE